# Patient Record
Sex: MALE | Race: WHITE | Employment: FULL TIME
[De-identification: names, ages, dates, MRNs, and addresses within clinical notes are randomized per-mention and may not be internally consistent; named-entity substitution may affect disease eponyms.]

---

## 2017-01-12 ENCOUNTER — OFFICE VISIT (OUTPATIENT)
Dept: ORTHOPEDIC SURGERY | Facility: CLINIC | Age: 57
End: 2017-01-12

## 2017-01-12 DIAGNOSIS — M16.11 ARTHRITIS OF RIGHT HIP: ICD-10-CM

## 2017-01-12 DIAGNOSIS — G89.29 CHRONIC MIDLINE LOW BACK PAIN WITHOUT SCIATICA: ICD-10-CM

## 2017-01-12 DIAGNOSIS — M54.50 CHRONIC MIDLINE LOW BACK PAIN WITHOUT SCIATICA: ICD-10-CM

## 2017-01-12 DIAGNOSIS — M25.551 RIGHT HIP PAIN: Primary | ICD-10-CM

## 2017-01-12 PROCEDURE — 99213 OFFICE O/P EST LOW 20 MIN: CPT | Performed by: ORTHOPAEDIC SURGERY

## 2017-01-16 ENCOUNTER — TELEPHONE (OUTPATIENT)
Dept: ORTHOPEDIC SURGERY | Facility: CLINIC | Age: 57
End: 2017-01-16

## 2017-01-25 ENCOUNTER — OFFICE VISIT (OUTPATIENT)
Dept: FAMILY MEDICINE CLINIC | Facility: CLINIC | Age: 57
End: 2017-01-25

## 2017-01-25 VITALS
DIASTOLIC BLOOD PRESSURE: 72 MMHG | BODY MASS INDEX: 25.68 KG/M2 | HEIGHT: 70 IN | WEIGHT: 179.4 LBS | SYSTOLIC BLOOD PRESSURE: 115 MMHG | HEART RATE: 64 BPM

## 2017-01-25 DIAGNOSIS — Z00.00 ENCOUNTER FOR WELL ADULT EXAM WITHOUT ABNORMAL FINDINGS: Primary | ICD-10-CM

## 2017-01-25 PROCEDURE — 99386 PREV VISIT NEW AGE 40-64: CPT | Performed by: FAMILY MEDICINE

## 2017-01-25 RX ORDER — SUMATRIPTAN 100 MG/1
100 TABLET, FILM COATED ORAL
COMMUNITY
End: 2017-12-28 | Stop reason: SDUPTHER

## 2017-03-10 ENCOUNTER — OFFICE VISIT (OUTPATIENT)
Dept: FAMILY MEDICINE CLINIC | Facility: CLINIC | Age: 57
End: 2017-03-10

## 2017-03-10 VITALS
HEIGHT: 70 IN | BODY MASS INDEX: 25.34 KG/M2 | RESPIRATION RATE: 16 BRPM | WEIGHT: 177 LBS | HEART RATE: 74 BPM | SYSTOLIC BLOOD PRESSURE: 119 MMHG | DIASTOLIC BLOOD PRESSURE: 78 MMHG | OXYGEN SATURATION: 96 %

## 2017-03-10 DIAGNOSIS — S86.112A: Primary | ICD-10-CM

## 2017-03-10 PROCEDURE — 99213 OFFICE O/P EST LOW 20 MIN: CPT | Performed by: FAMILY MEDICINE

## 2017-04-12 ENCOUNTER — EMPLOYEE WELLNESS (OUTPATIENT)
Dept: OTHER | Age: 57
End: 2017-04-12

## 2017-04-12 LAB
CHOLESTEROL/HDL RATIO: 4.8
CHOLESTEROL: 191 MG/DL
GLUCOSE BLD-MCNC: 97 MG/DL (ref 70–99)
HDLC SERPL-MCNC: 40 MG/DL
LDL CHOLESTEROL: 119 MG/DL (ref 0–130)
PATIENT FASTING?: YES
TRIGL SERPL-MCNC: 159 MG/DL
VLDLC SERPL CALC-MCNC: ABNORMAL MG/DL (ref 1–30)

## 2017-05-18 ENCOUNTER — HOSPITAL ENCOUNTER (OUTPATIENT)
Dept: PHYSICAL THERAPY | Facility: CLINIC | Age: 57
Setting detail: THERAPIES SERIES
Discharge: HOME | End: 2017-05-18
Payer: COMMERCIAL

## 2017-05-18 PROCEDURE — 97162 PT EVAL MOD COMPLEX 30 MIN: CPT

## 2017-05-18 PROCEDURE — 97110 THERAPEUTIC EXERCISES: CPT

## 2017-05-22 ENCOUNTER — HOSPITAL ENCOUNTER (OUTPATIENT)
Dept: PHYSICAL THERAPY | Facility: CLINIC | Age: 57
Setting detail: THERAPIES SERIES
Discharge: HOME | End: 2017-05-22
Payer: COMMERCIAL

## 2017-05-22 PROCEDURE — 97110 THERAPEUTIC EXERCISES: CPT

## 2017-05-23 ENCOUNTER — HOSPITAL ENCOUNTER (OUTPATIENT)
Dept: PHYSICAL THERAPY | Facility: CLINIC | Age: 57
Setting detail: THERAPIES SERIES
Discharge: HOME | End: 2017-05-23
Payer: COMMERCIAL

## 2017-05-23 PROCEDURE — 97110 THERAPEUTIC EXERCISES: CPT

## 2017-05-24 ENCOUNTER — HOSPITAL ENCOUNTER (OUTPATIENT)
Age: 57
Setting detail: SPECIMEN
Discharge: HOME | End: 2017-05-24
Payer: COMMERCIAL

## 2017-05-24 DIAGNOSIS — Z00.00 ENCOUNTER FOR WELL ADULT EXAM WITHOUT ABNORMAL FINDINGS: ICD-10-CM

## 2017-05-24 LAB
ABSOLUTE EOS #: 0.3 K/UL (ref 0–0.4)
ABSOLUTE LYMPH #: 1.8 K/UL (ref 1–4.8)
ABSOLUTE MONO #: 0.5 K/UL (ref 0.1–1.2)
ALBUMIN SERPL-MCNC: 4.3 G/DL (ref 3.5–5.2)
ALBUMIN/GLOBULIN RATIO: 1.4 (ref 1–2.5)
ALP BLD-CCNC: 50 U/L (ref 40–129)
ALT SERPL-CCNC: 24 U/L (ref 5–41)
ANION GAP SERPL CALCULATED.3IONS-SCNC: 15 MMOL/L (ref 9–17)
AST SERPL-CCNC: 20 U/L
BASOPHILS # BLD: 0 %
BASOPHILS ABSOLUTE: 0 K/UL (ref 0–0.2)
BILIRUB SERPL-MCNC: 0.72 MG/DL (ref 0.3–1.2)
BUN BLDV-MCNC: 17 MG/DL (ref 6–20)
BUN/CREAT BLD: ABNORMAL (ref 9–20)
CALCIUM SERPL-MCNC: 9.2 MG/DL (ref 8.6–10.4)
CHLORIDE BLD-SCNC: 107 MMOL/L (ref 98–107)
CO2: 23 MMOL/L (ref 20–31)
CREAT SERPL-MCNC: 0.79 MG/DL (ref 0.7–1.2)
DIFFERENTIAL TYPE: NORMAL
EOSINOPHILS RELATIVE PERCENT: 4 %
GFR AFRICAN AMERICAN: >60 ML/MIN
GFR NON-AFRICAN AMERICAN: >60 ML/MIN
GFR SERPL CREATININE-BSD FRML MDRD: ABNORMAL ML/MIN/{1.73_M2}
GFR SERPL CREATININE-BSD FRML MDRD: ABNORMAL ML/MIN/{1.73_M2}
GLUCOSE BLD-MCNC: 97 MG/DL (ref 70–99)
HCT VFR BLD CALC: 46.3 % (ref 41–53)
HEMOGLOBIN: 15.6 G/DL (ref 13.5–17.5)
LYMPHOCYTES # BLD: 23 %
MCH RBC QN AUTO: 31 PG (ref 26–34)
MCHC RBC AUTO-ENTMCNC: 33.6 G/DL (ref 31–37)
MCV RBC AUTO: 92.1 FL (ref 80–100)
MONOCYTES # BLD: 6 %
PDW BLD-RTO: 13.9 % (ref 12.5–15.4)
PLATELET # BLD: 159 K/UL (ref 140–450)
PLATELET ESTIMATE: NORMAL
PMV BLD AUTO: 8.8 FL (ref 6–12)
POTASSIUM SERPL-SCNC: 4.8 MMOL/L (ref 3.7–5.3)
PROSTATE SPECIFIC ANTIGEN: 2.32 UG/L
RBC # BLD: 5.02 M/UL (ref 4.5–5.9)
RBC # BLD: NORMAL 10*6/UL
SEG NEUTROPHILS: 67 %
SEGMENTED NEUTROPHILS ABSOLUTE COUNT: 5.2 K/UL (ref 1.8–7.7)
SODIUM BLD-SCNC: 145 MMOL/L (ref 135–144)
THYROXINE, FREE: 1.19 NG/DL (ref 0.93–1.7)
TOTAL PROTEIN: 7.4 G/DL (ref 6.4–8.3)
TSH SERPL DL<=0.05 MIU/L-ACNC: 2.58 MIU/L (ref 0.3–5)
WBC # BLD: 7.8 K/UL (ref 3.5–11)
WBC # BLD: NORMAL 10*3/UL

## 2017-05-25 ENCOUNTER — HOSPITAL ENCOUNTER (OUTPATIENT)
Dept: PHYSICAL THERAPY | Facility: CLINIC | Age: 57
Setting detail: THERAPIES SERIES
Discharge: HOME | End: 2017-05-25
Payer: COMMERCIAL

## 2017-05-25 PROCEDURE — 97110 THERAPEUTIC EXERCISES: CPT

## 2017-05-26 LAB — THYROID PEROXIDASE (TPO) AB: 40.3 IU/ML (ref 0–35)

## 2017-05-30 ENCOUNTER — HOSPITAL ENCOUNTER (OUTPATIENT)
Dept: PHYSICAL THERAPY | Facility: CLINIC | Age: 57
Setting detail: THERAPIES SERIES
Discharge: HOME | End: 2017-05-30
Payer: COMMERCIAL

## 2017-05-30 PROCEDURE — 97110 THERAPEUTIC EXERCISES: CPT

## 2017-06-01 ENCOUNTER — HOSPITAL ENCOUNTER (OUTPATIENT)
Dept: PHYSICAL THERAPY | Facility: CLINIC | Age: 57
Setting detail: THERAPIES SERIES
Discharge: HOME | End: 2017-06-01
Payer: COMMERCIAL

## 2017-06-01 PROCEDURE — 97110 THERAPEUTIC EXERCISES: CPT

## 2017-06-05 ENCOUNTER — APPOINTMENT (OUTPATIENT)
Dept: PHYSICAL THERAPY | Facility: CLINIC | Age: 57
End: 2017-06-05
Payer: COMMERCIAL

## 2017-06-06 ENCOUNTER — HOSPITAL ENCOUNTER (OUTPATIENT)
Dept: PHYSICAL THERAPY | Facility: CLINIC | Age: 57
Setting detail: THERAPIES SERIES
Discharge: HOME | End: 2017-06-06
Payer: COMMERCIAL

## 2017-06-06 PROCEDURE — 97110 THERAPEUTIC EXERCISES: CPT

## 2017-06-07 ENCOUNTER — APPOINTMENT (OUTPATIENT)
Dept: PHYSICAL THERAPY | Facility: CLINIC | Age: 57
End: 2017-06-07
Payer: COMMERCIAL

## 2017-06-08 ENCOUNTER — APPOINTMENT (OUTPATIENT)
Dept: PHYSICAL THERAPY | Facility: CLINIC | Age: 57
End: 2017-06-08
Payer: COMMERCIAL

## 2017-06-12 ENCOUNTER — APPOINTMENT (OUTPATIENT)
Dept: PHYSICAL THERAPY | Facility: CLINIC | Age: 57
End: 2017-06-12
Payer: COMMERCIAL

## 2017-06-13 ENCOUNTER — HOSPITAL ENCOUNTER (OUTPATIENT)
Dept: PHYSICAL THERAPY | Facility: CLINIC | Age: 57
Setting detail: THERAPIES SERIES
Discharge: HOME | End: 2017-06-13
Payer: COMMERCIAL

## 2017-06-13 PROCEDURE — 97110 THERAPEUTIC EXERCISES: CPT

## 2017-06-14 ENCOUNTER — HOSPITAL ENCOUNTER (OUTPATIENT)
Dept: PHYSICAL THERAPY | Facility: CLINIC | Age: 57
Setting detail: THERAPIES SERIES
Discharge: HOME | End: 2017-06-14
Payer: COMMERCIAL

## 2017-06-14 PROCEDURE — 97110 THERAPEUTIC EXERCISES: CPT

## 2017-06-15 ENCOUNTER — APPOINTMENT (OUTPATIENT)
Dept: PHYSICAL THERAPY | Facility: CLINIC | Age: 57
End: 2017-06-15
Payer: COMMERCIAL

## 2017-06-20 ENCOUNTER — APPOINTMENT (OUTPATIENT)
Dept: PHYSICAL THERAPY | Facility: CLINIC | Age: 57
End: 2017-06-20
Payer: COMMERCIAL

## 2017-07-06 ENCOUNTER — OFFICE VISIT (OUTPATIENT)
Dept: FAMILY MEDICINE CLINIC | Age: 57
End: 2017-07-06
Payer: COMMERCIAL

## 2017-07-06 VITALS
TEMPERATURE: 97.9 F | BODY MASS INDEX: 25.48 KG/M2 | RESPIRATION RATE: 14 BRPM | HEIGHT: 70 IN | SYSTOLIC BLOOD PRESSURE: 120 MMHG | OXYGEN SATURATION: 96 % | WEIGHT: 178 LBS | DIASTOLIC BLOOD PRESSURE: 77 MMHG | HEART RATE: 54 BPM

## 2017-07-06 DIAGNOSIS — H66.91 RIGHT OTITIS MEDIA, UNSPECIFIED CHRONICITY, UNSPECIFIED OTITIS MEDIA TYPE: Primary | ICD-10-CM

## 2017-07-06 PROCEDURE — 99214 OFFICE O/P EST MOD 30 MIN: CPT | Performed by: FAMILY MEDICINE

## 2017-07-06 RX ORDER — PREDNISONE 20 MG/1
40 TABLET ORAL DAILY
Qty: 8 TABLET | Refills: 0 | Status: SHIPPED | OUTPATIENT
Start: 2017-07-06 | End: 2017-07-10

## 2017-07-06 RX ORDER — AMOXICILLIN AND CLAVULANATE POTASSIUM 875; 125 MG/1; MG/1
1 TABLET, FILM COATED ORAL 2 TIMES DAILY
Qty: 20 TABLET | Refills: 0 | Status: SHIPPED | OUTPATIENT
Start: 2017-07-06 | End: 2017-07-16

## 2017-07-06 ASSESSMENT — PATIENT HEALTH QUESTIONNAIRE - PHQ9
1. LITTLE INTEREST OR PLEASURE IN DOING THINGS: 0
SUM OF ALL RESPONSES TO PHQ9 QUESTIONS 1 & 2: 0
SUM OF ALL RESPONSES TO PHQ QUESTIONS 1-9: 0
2. FEELING DOWN, DEPRESSED OR HOPELESS: 0

## 2017-09-25 ENCOUNTER — HOSPITAL ENCOUNTER (OUTPATIENT)
Age: 57
Setting detail: SPECIMEN
Discharge: HOME | End: 2017-09-25
Payer: COMMERCIAL

## 2017-09-25 RX ORDER — OMEPRAZOLE 20 MG/1
20 CAPSULE, DELAYED RELEASE ORAL 2 TIMES DAILY
Qty: 180 CAPSULE | Refills: 1 | Status: SHIPPED | OUTPATIENT
Start: 2017-09-25 | End: 2019-03-06 | Stop reason: SDUPTHER

## 2017-09-28 LAB — DERMATOLOGY PATHOLOGY REPORT: NORMAL

## 2017-11-14 ENCOUNTER — OFFICE VISIT (OUTPATIENT)
Dept: FAMILY MEDICINE CLINIC | Age: 57
End: 2017-11-14
Payer: COMMERCIAL

## 2017-11-14 ENCOUNTER — HOSPITAL ENCOUNTER (OUTPATIENT)
Age: 57
Setting detail: SPECIMEN
Discharge: HOME | End: 2017-11-14
Payer: COMMERCIAL

## 2017-11-14 VITALS
DIASTOLIC BLOOD PRESSURE: 72 MMHG | OXYGEN SATURATION: 96 % | RESPIRATION RATE: 16 BRPM | WEIGHT: 184 LBS | TEMPERATURE: 98.3 F | SYSTOLIC BLOOD PRESSURE: 114 MMHG | BODY MASS INDEX: 26.34 KG/M2 | HEIGHT: 70 IN | HEART RATE: 57 BPM

## 2017-11-14 DIAGNOSIS — R05.3 PERSISTENT COUGH: ICD-10-CM

## 2017-11-14 DIAGNOSIS — J40 BRONCHITIS: Primary | ICD-10-CM

## 2017-11-14 PROCEDURE — 99214 OFFICE O/P EST MOD 30 MIN: CPT | Performed by: FAMILY MEDICINE

## 2017-11-14 RX ORDER — GUAIFENESIN 600 MG/1
600 TABLET, EXTENDED RELEASE ORAL 2 TIMES DAILY
Qty: 20 TABLET | Refills: 0 | Status: SHIPPED | OUTPATIENT
Start: 2017-11-14 | End: 2019-06-25

## 2017-11-14 NOTE — PROGRESS NOTES
bronchitis. Cough  Patient complains of productive cough with sputum described as yellow and sore throat. Symptoms began several weeks ago. Symptoms have been unchanged since that time. The cough is productive and is aggravated by nothing. Associated symptoms include: sore throat. Patient does not have new pets. Patient does not have a history of asthma. Patient does not have a history of environmental allergens. Patient has not traveled recently. Patient does not have a history of smoking. Patient has not had a previous chest x-ray. Patient has not had a PPD done. Acid reflux, controlled. Review of Systems   Constitutional: Negative for fever and unexpected weight change. HENT: Negative for ear pain, congestion, + sore throat and  + rhinorrhea. Eyes: Negative for itching and visual disturbance. Respiratory: Negative for shortness of breath.   positive for cough plus sputum production. Cardiovascular: Negative for chest pain and leg swelling. Gastrointestinal: Negative for diarrhea, constipation and blood in stool. Skin: Negative for color change and rash. Objective:   Physical Exam  Constitutional: VS (see above). General appearance: normal development, habitus and attention, no deformities. Eyes: normal conjunctiva and lids. TMs bilaterally with normal limits. Slight cerumen present in his right ear. Oropharynx: Slight post nasal drip present. No redness, no irritation. Neck: No lymph nodes palpable. CAV: RRR, no RMG. No edema lower extremities. Pulmo: CTA bilateral, no CWR. Skin: no rashes, lesions or ulcers. Musculoskeletal: normal gait. Nails: no clubbing or cyanosis. Psychiatric: alert and oriented to place, time and person. Normal mood and affect. Assessment:      1. Bronchitis     2. Persistent cough  guaiFENesin (MUCINEX) 600 MG extended release tablet    Mycoplasma Pneumoniae Antibody, IgG       Plan:   She has probably a viral bronchitis.   I discussed with him to watch it.  If this does not resolve in 2-4 weeks. He needs to call back. I discussed with him a viral bronchitis could stay around for 4 - 8 weeks. Blood work ordered. med called in. Call or return to clinic prn if these symptoms worsen or fail to improve as anticipated. I have reviewed the instructions with the patient, answering all questions to his satisfaction. Return in about 6 months (around 5/14/2018), or if symptoms worsen or fail to improve.   Orders Placed This Encounter   Procedures    Mycoplasma Pneumoniae Antibody, IgG     Standing Status:   Future     Standing Expiration Date:   11/14/2018     Orders Placed This Encounter   Medications    guaiFENesin (MUCINEX) 600 MG extended release tablet     Sig: Take 1 tablet by mouth 2 times daily     Dispense:  20 tablet     Refill:  0       Electronically signed by Jarvis Taylor MD on 11/14/2017 at 10:31 AM       (Please note that portions of this note were completed with a voice recognition program. Efforts were made to edit the dictations but occasionally words are mis-transcribed.)

## 2017-11-14 NOTE — PROGRESS NOTES
Visit Information    Have you changed or started any medications since your last visit including any over-the-counter medicines, vitamins, or herbal medicines? no   Have you stopped taking any of your medications? Is so, why? -  no  Are you having any side effects from any of your medications? - no    Have you seen any other physician or provider since your last visit?  no   Have you had any other diagnostic tests since your last visit?  no   Have you been seen in the emergency room and/or had an admission in a hospital since we last saw you?  no   Have you had your routine dental cleaning in the past 6 months?  yes      Do you have an active MyChart account? If no, what is the barrier?   Yes    Patient Care Team:  Bob Mayo MD as PCP - General (Family Medicine)  Bob Mayo MD as PCP - [facility] Attributed Provider    Medical History Review  Past Medical, Family, and Social History reviewed and does not contribute to the patient presenting condition    Health Maintenance   Topic Date Due    Hepatitis C screen  1960    HIV screen  04/15/1977    DTaP/Tdap/Td vaccine (1 - Tdap) 04/15/1981    Flu vaccine (1) 09/01/2017    Colon cancer screen colonoscopy  11/30/2017 (Originally 4/15/2012)    Lipid screen  04/02/2020

## 2017-11-20 LAB — MYCOPLASMA PNEUMONIAE IGG: 1.29

## 2017-11-20 RX ORDER — AZITHROMYCIN 250 MG/1
TABLET, FILM COATED ORAL
Qty: 6 TABLET | Refills: 0 | Status: SHIPPED | OUTPATIENT
Start: 2017-11-20 | End: 2017-11-30

## 2017-12-29 RX ORDER — SUMATRIPTAN 100 MG/1
100 TABLET, FILM COATED ORAL
Qty: 9 TABLET | Refills: 0 | Status: SHIPPED | OUTPATIENT
Start: 2017-12-29 | End: 2018-08-03 | Stop reason: SDUPTHER

## 2018-03-20 VITALS — BODY MASS INDEX: 25.34 KG/M2 | WEIGHT: 177 LBS

## 2018-03-22 ENCOUNTER — EMPLOYEE WELLNESS (OUTPATIENT)
Dept: OTHER | Age: 58
End: 2018-03-22

## 2018-03-22 LAB
CHOLESTEROL/HDL RATIO: 3.9
CHOLESTEROL: 193 MG/DL
GLUCOSE BLD-MCNC: 96 MG/DL (ref 70–99)
HDLC SERPL-MCNC: 50 MG/DL
LDL CHOLESTEROL: 116 MG/DL (ref 0–130)
PATIENT FASTING?: YES
TRIGL SERPL-MCNC: 137 MG/DL
VLDLC SERPL CALC-MCNC: NORMAL MG/DL (ref 1–30)

## 2018-03-28 ENCOUNTER — HOSPITAL ENCOUNTER (OUTPATIENT)
Dept: PHYSICAL THERAPY | Facility: CLINIC | Age: 58
Setting detail: THERAPIES SERIES
Discharge: HOME | End: 2018-03-28
Payer: COMMERCIAL

## 2018-03-28 PROCEDURE — 97110 THERAPEUTIC EXERCISES: CPT

## 2018-03-28 PROCEDURE — 97161 PT EVAL LOW COMPLEX 20 MIN: CPT

## 2018-03-28 NOTE — CONSULTS
wnl  5   Knee Flex  wnl  5   Ext  wnl  5   Ankle DF  wnl  5   PF  wnl  5   INV  wnl  5   EVER  wnl  5   GTE  wnl  5       OBSERVATION No Deficit Deficit Not Tested Comments   Posture       Forward Head [] [] []    Rounded Shoulders [] [] []    Kyphosis [] [] []    Lordosis [] [] []    Scoliosis [] [] []    Iliac Crest [] [] []    PSIS [] [] []    ASIS [] [] []    Genu Valgus [] [] []    Genu Varus [] [] []    Genu Recurvatum [] [] []    Pronation [] [] []    Supination [] [] []    Leg Length Discrp [x] [] []    Slumped Sitting [] [] []    Palpation [] [x] [] Mid substance of R AT, neg at insertion and muscle tendon jtn   Sensation [] [] []    Edema [x] [] []    Neurological [] [] []    Patellar Mobility [] [] []    Patellar Orientation [] [] []    Gait [] [] [] Analysis:     Video Run Analysis   Warm up:   Pace:   min/mile   Duration: 5 minutes    Shoes: [facility] Ride with Powersteps   Flor: 165 spm    Frontal plane deviations:    Normal pronation    Dynamic genu /varus bilaterally    Contralateral pelvic drop        Sagittal plane deviations:     Tibia is nearly perpendicular to ground with slight  at initial contact    Delayed heel off       Other: double stance,      Recommendations:     Increase flor by 5%    Comments:  Assessment:  Pt presents with primary complaint of R AT pain, which is most likely due to 2 issues. Eccentrically weak calf causing delayed heel to toe off in later 1/2 of stance phase. He also presents with limited hip extension ROM and strength and frontal plane weakness during initial contact to mid stance   Problems:    [x] ? Pain: in R achilles   [x] ? ROM: with hip ext   [x] ? Strength: Of glut max and calf    [x] ?  Function: Not able to run as he wishes   [x] Gait Deviations delayed heel off during running)  [] Other:      STG: (to be met in 3 treatments)  1. ? Pain:<2/10 in R achilles  2. ? ROM: with hip extension  3. ? Strength:to 4/5 with glut max  4. ? Function: able to run up to

## 2018-04-02 VITALS — BODY MASS INDEX: 25.34 KG/M2 | WEIGHT: 177 LBS

## 2018-04-03 ENCOUNTER — HOSPITAL ENCOUNTER (OUTPATIENT)
Dept: PHYSICAL THERAPY | Facility: CLINIC | Age: 58
Setting detail: THERAPIES SERIES
Discharge: HOME | End: 2018-04-03
Payer: COMMERCIAL

## 2018-04-03 PROCEDURE — 97110 THERAPEUTIC EXERCISES: CPT

## 2018-04-03 PROCEDURE — 97140 MANUAL THERAPY 1/> REGIONS: CPT

## 2018-04-03 PROCEDURE — 97112 NEUROMUSCULAR REEDUCATION: CPT

## 2018-04-04 ENCOUNTER — PATIENT MESSAGE (OUTPATIENT)
Dept: FAMILY MEDICINE CLINIC | Age: 58
End: 2018-04-04

## 2018-04-05 ENCOUNTER — HOSPITAL ENCOUNTER (OUTPATIENT)
Dept: PHYSICAL THERAPY | Facility: CLINIC | Age: 58
Setting detail: THERAPIES SERIES
Discharge: HOME | End: 2018-04-05
Payer: COMMERCIAL

## 2018-04-05 ENCOUNTER — ANESTHESIA EVENT (OUTPATIENT)
Dept: OPERATING ROOM | Age: 58
End: 2018-04-05
Payer: COMMERCIAL

## 2018-04-05 PROCEDURE — 97110 THERAPEUTIC EXERCISES: CPT

## 2018-04-05 RX ORDER — ALBUTEROL SULFATE 90 UG/1
2 AEROSOL, METERED RESPIRATORY (INHALATION) EVERY 6 HOURS PRN
Qty: 1 INHALER | Refills: 3 | Status: SHIPPED | OUTPATIENT
Start: 2018-04-05 | End: 2018-11-14 | Stop reason: SDUPTHER

## 2018-04-06 ENCOUNTER — HOSPITAL ENCOUNTER (OUTPATIENT)
Age: 58
Setting detail: OUTPATIENT SURGERY
Discharge: HOME | End: 2018-04-06
Attending: INTERNAL MEDICINE | Admitting: INTERNAL MEDICINE
Payer: COMMERCIAL

## 2018-04-06 ENCOUNTER — ANESTHESIA (OUTPATIENT)
Dept: OPERATING ROOM | Age: 58
End: 2018-04-06
Payer: COMMERCIAL

## 2018-04-06 VITALS
OXYGEN SATURATION: 98 % | TEMPERATURE: 97.9 F | HEIGHT: 71 IN | RESPIRATION RATE: 18 BRPM | HEART RATE: 62 BPM | BODY MASS INDEX: 24.78 KG/M2 | SYSTOLIC BLOOD PRESSURE: 132 MMHG | DIASTOLIC BLOOD PRESSURE: 75 MMHG | WEIGHT: 177.03 LBS

## 2018-04-06 VITALS
OXYGEN SATURATION: 97 % | SYSTOLIC BLOOD PRESSURE: 98 MMHG | RESPIRATION RATE: 15 BRPM | DIASTOLIC BLOOD PRESSURE: 56 MMHG

## 2018-04-06 PROCEDURE — 6360000002 HC RX W HCPCS: Performed by: NURSE ANESTHETIST, CERTIFIED REGISTERED

## 2018-04-06 PROCEDURE — 2500000003 HC RX 250 WO HCPCS: Performed by: NURSE ANESTHETIST, CERTIFIED REGISTERED

## 2018-04-06 PROCEDURE — 2580000003 HC RX 258: Performed by: ANESTHESIOLOGY

## 2018-04-06 PROCEDURE — 7100000010 HC PHASE II RECOVERY - FIRST 15 MIN: Performed by: INTERNAL MEDICINE

## 2018-04-06 PROCEDURE — 88305 TISSUE EXAM BY PATHOLOGIST: CPT

## 2018-04-06 PROCEDURE — 3700000000 HC ANESTHESIA ATTENDED CARE: Performed by: INTERNAL MEDICINE

## 2018-04-06 PROCEDURE — 3700000001 HC ADD 15 MINUTES (ANESTHESIA): Performed by: INTERNAL MEDICINE

## 2018-04-06 PROCEDURE — 3609010600 HC COLONOSCOPY POLYPECTOMY SNARE/COLD BIOPSY: Performed by: INTERNAL MEDICINE

## 2018-04-06 PROCEDURE — 7100000011 HC PHASE II RECOVERY - ADDTL 15 MIN: Performed by: INTERNAL MEDICINE

## 2018-04-06 RX ORDER — PROPOFOL 10 MG/ML
INJECTION, EMULSION INTRAVENOUS PRN
Status: DISCONTINUED | OUTPATIENT
Start: 2018-04-06 | End: 2018-04-06 | Stop reason: SDUPTHER

## 2018-04-06 RX ORDER — SODIUM CHLORIDE 0.9 % (FLUSH) 0.9 %
10 SYRINGE (ML) INJECTION EVERY 12 HOURS SCHEDULED
Status: DISCONTINUED | OUTPATIENT
Start: 2018-04-06 | End: 2018-04-06 | Stop reason: HOSPADM

## 2018-04-06 RX ORDER — ONDANSETRON 2 MG/ML
4 INJECTION INTRAMUSCULAR; INTRAVENOUS
Status: DISCONTINUED | OUTPATIENT
Start: 2018-04-06 | End: 2018-04-06 | Stop reason: HOSPADM

## 2018-04-06 RX ORDER — SODIUM CHLORIDE, SODIUM LACTATE, POTASSIUM CHLORIDE, CALCIUM CHLORIDE 600; 310; 30; 20 MG/100ML; MG/100ML; MG/100ML; MG/100ML
INJECTION, SOLUTION INTRAVENOUS CONTINUOUS
Status: DISCONTINUED | OUTPATIENT
Start: 2018-04-07 | End: 2018-04-06 | Stop reason: HOSPADM

## 2018-04-06 RX ORDER — FENTANYL CITRATE 50 UG/ML
INJECTION, SOLUTION INTRAMUSCULAR; INTRAVENOUS PRN
Status: DISCONTINUED | OUTPATIENT
Start: 2018-04-06 | End: 2018-04-06 | Stop reason: SDUPTHER

## 2018-04-06 RX ORDER — SODIUM CHLORIDE 9 MG/ML
INJECTION, SOLUTION INTRAVENOUS CONTINUOUS
Status: DISCONTINUED | OUTPATIENT
Start: 2018-04-07 | End: 2018-04-06

## 2018-04-06 RX ORDER — LIDOCAINE HYDROCHLORIDE 20 MG/ML
INJECTION, SOLUTION INFILTRATION; PERINEURAL PRN
Status: DISCONTINUED | OUTPATIENT
Start: 2018-04-06 | End: 2018-04-06 | Stop reason: SDUPTHER

## 2018-04-06 RX ORDER — SODIUM CHLORIDE 0.9 % (FLUSH) 0.9 %
10 SYRINGE (ML) INJECTION PRN
Status: DISCONTINUED | OUTPATIENT
Start: 2018-04-06 | End: 2018-04-06 | Stop reason: HOSPADM

## 2018-04-06 RX ORDER — MIDAZOLAM HYDROCHLORIDE 1 MG/ML
INJECTION INTRAMUSCULAR; INTRAVENOUS PRN
Status: DISCONTINUED | OUTPATIENT
Start: 2018-04-06 | End: 2018-04-06 | Stop reason: SDUPTHER

## 2018-04-06 RX ORDER — LIDOCAINE HYDROCHLORIDE 10 MG/ML
1 INJECTION, SOLUTION EPIDURAL; INFILTRATION; INTRACAUDAL; PERINEURAL
Status: DISCONTINUED | OUTPATIENT
Start: 2018-04-06 | End: 2018-04-06 | Stop reason: HOSPADM

## 2018-04-06 RX ADMIN — FENTANYL CITRATE 25 MCG: 50 INJECTION, SOLUTION INTRAMUSCULAR; INTRAVENOUS at 13:37

## 2018-04-06 RX ADMIN — PROPOFOL 20 MG: 10 INJECTION, EMULSION INTRAVENOUS at 13:39

## 2018-04-06 RX ADMIN — SODIUM CHLORIDE, POTASSIUM CHLORIDE, SODIUM LACTATE AND CALCIUM CHLORIDE: 600; 310; 30; 20 INJECTION, SOLUTION INTRAVENOUS at 12:13

## 2018-04-06 RX ADMIN — PROPOFOL 50 MG: 10 INJECTION, EMULSION INTRAVENOUS at 13:35

## 2018-04-06 RX ADMIN — PROPOFOL 10 MG: 10 INJECTION, EMULSION INTRAVENOUS at 13:41

## 2018-04-06 RX ADMIN — PROPOFOL 20 MG: 10 INJECTION, EMULSION INTRAVENOUS at 13:37

## 2018-04-06 RX ADMIN — LIDOCAINE HYDROCHLORIDE 100 MG: 20 INJECTION, SOLUTION INFILTRATION; PERINEURAL at 13:35

## 2018-04-06 RX ADMIN — PROPOFOL 40 MG: 10 INJECTION, EMULSION INTRAVENOUS at 13:49

## 2018-04-06 RX ADMIN — FENTANYL CITRATE 25 MCG: 50 INJECTION, SOLUTION INTRAMUSCULAR; INTRAVENOUS at 13:42

## 2018-04-06 RX ADMIN — FENTANYL CITRATE 25 MCG: 50 INJECTION, SOLUTION INTRAMUSCULAR; INTRAVENOUS at 13:35

## 2018-04-06 RX ADMIN — MIDAZOLAM HYDROCHLORIDE 2 MG: 1 INJECTION, SOLUTION INTRAMUSCULAR; INTRAVENOUS at 13:30

## 2018-04-06 RX ADMIN — PROPOFOL 20 MG: 10 INJECTION, EMULSION INTRAVENOUS at 13:40

## 2018-04-06 RX ADMIN — PROPOFOL 20 MG: 10 INJECTION, EMULSION INTRAVENOUS at 13:45

## 2018-04-06 RX ADMIN — PROPOFOL 20 MG: 10 INJECTION, EMULSION INTRAVENOUS at 13:43

## 2018-04-06 RX ADMIN — FENTANYL CITRATE 25 MCG: 50 INJECTION, SOLUTION INTRAMUSCULAR; INTRAVENOUS at 13:40

## 2018-04-06 ASSESSMENT — PULMONARY FUNCTION TESTS
PIF_VALUE: 1

## 2018-04-06 ASSESSMENT — ENCOUNTER SYMPTOMS: SHORTNESS OF BREATH: 0

## 2018-04-06 ASSESSMENT — PAIN - FUNCTIONAL ASSESSMENT: PAIN_FUNCTIONAL_ASSESSMENT: 0-10

## 2018-04-10 LAB — SURGICAL PATHOLOGY REPORT: NORMAL

## 2018-04-11 ENCOUNTER — APPOINTMENT (OUTPATIENT)
Dept: PHYSICAL THERAPY | Facility: CLINIC | Age: 58
End: 2018-04-11
Payer: COMMERCIAL

## 2018-04-13 ENCOUNTER — HOSPITAL ENCOUNTER (OUTPATIENT)
Dept: PHYSICAL THERAPY | Facility: CLINIC | Age: 58
Setting detail: THERAPIES SERIES
Discharge: HOME | End: 2018-04-13
Payer: COMMERCIAL

## 2018-04-13 PROCEDURE — 97530 THERAPEUTIC ACTIVITIES: CPT

## 2018-04-13 PROCEDURE — 97112 NEUROMUSCULAR REEDUCATION: CPT

## 2018-04-18 ENCOUNTER — TELEPHONE (OUTPATIENT)
Dept: ORTHOPEDIC SURGERY | Age: 58
End: 2018-04-18

## 2018-04-19 ENCOUNTER — PATIENT MESSAGE (OUTPATIENT)
Dept: FAMILY MEDICINE CLINIC | Age: 58
End: 2018-04-19

## 2018-05-17 ENCOUNTER — HOSPITAL ENCOUNTER (OUTPATIENT)
Dept: PHYSICAL THERAPY | Facility: CLINIC | Age: 58
Setting detail: THERAPIES SERIES
Discharge: HOME | End: 2018-05-17
Payer: COMMERCIAL

## 2018-05-17 ENCOUNTER — OFFICE VISIT (OUTPATIENT)
Dept: ORTHOPEDIC SURGERY | Age: 58
End: 2018-05-17
Payer: COMMERCIAL

## 2018-05-17 VITALS — WEIGHT: 175 LBS | BODY MASS INDEX: 24.5 KG/M2 | HEIGHT: 71 IN

## 2018-05-17 DIAGNOSIS — M76.891 TENDONITIS OF KNEE, RIGHT: ICD-10-CM

## 2018-05-17 DIAGNOSIS — M25.561 ACUTE PAIN OF RIGHT KNEE: Primary | ICD-10-CM

## 2018-05-17 PROCEDURE — 99213 OFFICE O/P EST LOW 20 MIN: CPT | Performed by: ORTHOPAEDIC SURGERY

## 2018-05-29 ENCOUNTER — HOSPITAL ENCOUNTER (OUTPATIENT)
Dept: PHYSICAL THERAPY | Facility: CLINIC | Age: 58
Setting detail: THERAPIES SERIES
Discharge: HOME | End: 2018-05-29
Payer: COMMERCIAL

## 2018-05-29 PROCEDURE — 97750 PHYSICAL PERFORMANCE TEST: CPT

## 2018-05-29 PROCEDURE — 97164 PT RE-EVAL EST PLAN CARE: CPT

## 2018-06-05 ENCOUNTER — HOSPITAL ENCOUNTER (OUTPATIENT)
Dept: PHYSICAL THERAPY | Facility: CLINIC | Age: 58
Setting detail: THERAPIES SERIES
Discharge: HOME | End: 2018-06-05
Payer: COMMERCIAL

## 2018-06-05 PROCEDURE — 97112 NEUROMUSCULAR REEDUCATION: CPT

## 2018-06-05 PROCEDURE — 97140 MANUAL THERAPY 1/> REGIONS: CPT

## 2018-06-05 PROCEDURE — 97110 THERAPEUTIC EXERCISES: CPT

## 2018-08-03 ENCOUNTER — PATIENT MESSAGE (OUTPATIENT)
Dept: FAMILY MEDICINE CLINIC | Age: 58
End: 2018-08-03

## 2018-08-03 NOTE — TELEPHONE ENCOUNTER
From: Kenda Siemens  To: Karon Navarro MD  Sent: 8/3/2018 6:12 AM EDT  Subject: Prescription Question    Good Morning Dr. Chidi Ni,  Can you call in two prescriptions for me? I need refills on my Nasonex and Sumatriptan 100 mg.  Earnest Andino [phone]  Thank you,  Jennifer Meneses

## 2018-08-04 RX ORDER — MOMETASONE FUROATE 50 UG/1
2 SPRAY, METERED NASAL DAILY
Qty: 1 INHALER | Refills: 3 | Status: SHIPPED | OUTPATIENT
Start: 2018-08-04 | End: 2018-11-30

## 2018-08-04 RX ORDER — SUMATRIPTAN 100 MG/1
100 TABLET, FILM COATED ORAL
Qty: 9 TABLET | Refills: 0 | Status: SHIPPED | OUTPATIENT
Start: 2018-08-04 | End: 2019-06-21 | Stop reason: SDUPTHER

## 2018-08-06 RX ORDER — FLUTICASONE PROPIONATE 50 MCG
1 SPRAY, SUSPENSION (ML) NASAL DAILY
Qty: 1 BOTTLE | Refills: 3 | Status: SHIPPED | OUTPATIENT
Start: 2018-08-06 | End: 2018-10-01 | Stop reason: SDUPTHER

## 2018-08-22 ENCOUNTER — HOSPITAL ENCOUNTER (OUTPATIENT)
Dept: PHYSICAL THERAPY | Facility: CLINIC | Age: 58
Setting detail: THERAPIES SERIES
Discharge: HOME | End: 2018-08-22
Payer: COMMERCIAL

## 2018-08-22 NOTE — DISCHARGE SUMMARY
HEP/Ed previously given.        Patient Status:     [] Continue per initial plan of care. [] Additional visits necessary.     [x] Other:DC at this time     Signed: Hector Lopez PT

## 2018-09-28 ENCOUNTER — PATIENT MESSAGE (OUTPATIENT)
Dept: FAMILY MEDICINE CLINIC | Age: 58
End: 2018-09-28

## 2018-10-01 RX ORDER — FLUTICASONE PROPIONATE 50 MCG
1 SPRAY, SUSPENSION (ML) NASAL DAILY
Qty: 1 BOTTLE | Refills: 3 | Status: SHIPPED | OUTPATIENT
Start: 2018-10-01 | End: 2018-11-30

## 2018-10-02 ENCOUNTER — PATIENT MESSAGE (OUTPATIENT)
Dept: FAMILY MEDICINE CLINIC | Age: 58
End: 2018-10-02

## 2018-10-02 DIAGNOSIS — Z12.5 PROSTATE CANCER SCREENING: Primary | ICD-10-CM

## 2018-10-10 ENCOUNTER — HOSPITAL ENCOUNTER (OUTPATIENT)
Age: 58
Setting detail: SPECIMEN
Discharge: HOME | End: 2018-10-10
Payer: COMMERCIAL

## 2018-10-10 DIAGNOSIS — Z12.5 PROSTATE CANCER SCREENING: ICD-10-CM

## 2018-10-10 LAB — PROSTATE SPECIFIC ANTIGEN: 1.25 UG/L

## 2018-11-14 ENCOUNTER — PATIENT MESSAGE (OUTPATIENT)
Dept: FAMILY MEDICINE CLINIC | Age: 58
End: 2018-11-14

## 2018-11-14 RX ORDER — ALBUTEROL SULFATE 90 UG/1
2 AEROSOL, METERED RESPIRATORY (INHALATION) EVERY 6 HOURS PRN
Qty: 1 INHALER | Refills: 3 | Status: SHIPPED | OUTPATIENT
Start: 2018-11-14 | End: 2021-06-04 | Stop reason: SDUPTHER

## 2018-11-19 ENCOUNTER — OFFICE VISIT (OUTPATIENT)
Dept: UROLOGY | Age: 58
End: 2018-11-19
Payer: COMMERCIAL

## 2018-11-19 VITALS
TEMPERATURE: 98 F | DIASTOLIC BLOOD PRESSURE: 70 MMHG | BODY MASS INDEX: 25.62 KG/M2 | HEART RATE: 75 BPM | WEIGHT: 183 LBS | SYSTOLIC BLOOD PRESSURE: 118 MMHG | HEIGHT: 71 IN

## 2018-11-19 DIAGNOSIS — R35.1 NOCTURIA: ICD-10-CM

## 2018-11-19 DIAGNOSIS — R39.15 URGENCY OF URINATION: ICD-10-CM

## 2018-11-19 DIAGNOSIS — Z12.5 PROSTATE CANCER SCREENING: Primary | ICD-10-CM

## 2018-11-19 PROCEDURE — 99204 OFFICE O/P NEW MOD 45 MIN: CPT | Performed by: UROLOGY

## 2018-11-19 ASSESSMENT — ENCOUNTER SYMPTOMS
ANAL BLEEDING: 0
EYE PAIN: 0
EYE REDNESS: 0
BACK PAIN: 1
COUGH: 0
COLOR CHANGE: 0
SORE THROAT: 0
WHEEZING: 0
VOMITING: 0
NAUSEA: 0
ABDOMINAL PAIN: 0
SHORTNESS OF BREATH: 0
VOICE CHANGE: 0

## 2018-11-30 ENCOUNTER — OFFICE VISIT (OUTPATIENT)
Dept: FAMILY MEDICINE CLINIC | Age: 58
End: 2018-11-30
Payer: COMMERCIAL

## 2018-11-30 VITALS
HEART RATE: 57 BPM | WEIGHT: 181.4 LBS | TEMPERATURE: 96.7 F | RESPIRATION RATE: 16 BRPM | HEIGHT: 71 IN | BODY MASS INDEX: 25.4 KG/M2 | SYSTOLIC BLOOD PRESSURE: 128 MMHG | DIASTOLIC BLOOD PRESSURE: 80 MMHG

## 2018-11-30 DIAGNOSIS — J45.21 MILD INTERMITTENT ASTHMA WITH EXACERBATION: Primary | ICD-10-CM

## 2018-11-30 DIAGNOSIS — Z23 NEEDS FLU SHOT: ICD-10-CM

## 2018-11-30 DIAGNOSIS — H65.93 MEE (MIDDLE EAR EFFUSION), BILATERAL: ICD-10-CM

## 2018-11-30 PROCEDURE — 99213 OFFICE O/P EST LOW 20 MIN: CPT | Performed by: FAMILY MEDICINE

## 2018-11-30 PROCEDURE — 90471 IMMUNIZATION ADMIN: CPT | Performed by: FAMILY MEDICINE

## 2018-11-30 PROCEDURE — 90686 IIV4 VACC NO PRSV 0.5 ML IM: CPT | Performed by: FAMILY MEDICINE

## 2018-11-30 RX ORDER — PREDNISONE 20 MG/1
40 TABLET ORAL DAILY
Qty: 10 TABLET | Refills: 0 | Status: SHIPPED | OUTPATIENT
Start: 2018-11-30 | End: 2018-12-05

## 2018-11-30 RX ORDER — FLUTICASONE PROPIONATE 50 MCG
2 SPRAY, SUSPENSION (ML) NASAL DAILY
Qty: 1 BOTTLE | Refills: 0 | Status: SHIPPED | OUTPATIENT
Start: 2018-11-30 | End: 2019-06-25

## 2018-11-30 ASSESSMENT — PATIENT HEALTH QUESTIONNAIRE - PHQ9
SUM OF ALL RESPONSES TO PHQ9 QUESTIONS 1 & 2: 0
SUM OF ALL RESPONSES TO PHQ QUESTIONS 1-9: 0
1. LITTLE INTEREST OR PLEASURE IN DOING THINGS: 0
2. FEELING DOWN, DEPRESSED OR HOPELESS: 0
SUM OF ALL RESPONSES TO PHQ QUESTIONS 1-9: 0

## 2018-11-30 ASSESSMENT — ENCOUNTER SYMPTOMS
SORE THROAT: 0
BLOOD IN STOOL: 0
WHEEZING: 1
COUGH: 1
EYE PAIN: 0
SHORTNESS OF BREATH: 0

## 2018-11-30 NOTE — PROGRESS NOTES
Visit Information    Have you changed or started any medications since your last visit including any over-the-counter medicines, vitamins, or herbal medicines? no   Are you having any side effects from any of your medications? -  no  Have you stopped taking any of your medications? Is so, why? -  no    Have you seen any other physician or provider since your last visit? No  Have you had any other diagnostic tests since your last visit? No  Have you been seen in the emergency room and/or had an admission to a hospital since we last saw you? Have you had your routine dental cleaning in the past 6 months? no    Have you activated your [employer] account? If not, what are your barriers?  Yes     Patient Care Team:  Anastasiya Buckley MD as PCP - General (Family Medicine)  Anastasiya Buckley MD as PCP - [facility] Attributed Provider    Medical History Review  Past Medical, Family, and Social History reviewed and does not contribute to the patient presenting condition    Health Maintenance   Topic Date Due    Hepatitis C screen  1960    HIV screen  04/15/1977    DTaP/Tdap/Td vaccine (1 - Tdap) 04/15/1981    Shingles Vaccine (1 of 2 - 2 Dose Series) 04/15/2012    Flu vaccine (1) 09/01/2018    Lipid screen  03/22/2023    Colon cancer screen colonoscopy  04/06/2028

## 2018-11-30 NOTE — PROGRESS NOTES
Comment: socially       Current Outpatient Prescriptions:     predniSONE (DELTASONE) 20 MG tablet, Take 2 tablets by mouth daily for 5 days, Disp: 10 tablet, Rfl: 0    fluticasone (FLONASE) 50 MCG/ACT nasal spray, 2 sprays by Nasal route daily, Disp: 1 Bottle, Rfl: 0    albuterol sulfate HFA (PROAIR HFA) 108 (90 Base) MCG/ACT inhaler, Inhale 2 puffs into the lungs every 6 hours as needed for Wheezing, Disp: 1 Inhaler, Rfl: 3    fluticasone-salmeterol (ADVAIR DISKUS) 250-50 MCG/DOSE AEPB, Inhale 1 puff into the lungs every 12 hours, Disp: 180 each, Rfl: 3    omeprazole (PRILOSEC) 20 MG delayed release capsule, Take 1 capsule by mouth 2 times daily, Disp: 180 capsule, Rfl: 1    SUMAtriptan (IMITREX) 100 MG tablet, Take 1 tablet by mouth once as needed for Migraine, Disp: 9 tablet, Rfl: 0    guaiFENesin (MUCINEX) 600 MG extended release tablet, Take 1 tablet by mouth 2 times daily, Disp: 20 tablet, Rfl: 0    Subjective:     Review of Systems   Constitutional: Negative for appetite change, diaphoresis, fever and unexpected weight change. HENT: Positive for congestion. Negative for ear pain and sore throat. Eyes: Negative for pain. Respiratory: Positive for cough and wheezing. Negative for shortness of breath. Cardiovascular: Negative for chest pain and leg swelling. Gastrointestinal: Negative for blood in stool. Musculoskeletal: Negative for gait problem and myalgias. Skin: Negative for pallor and rash. Neurological: Negative for seizures and headaches. Psychiatric/Behavioral: Negative for dysphoric mood and suicidal ideas. Objective:     /80   Pulse 57   Temp 96.7 °F (35.9 °C)   Resp 16   Ht 5' 10.98\" (1.803 m)   Wt 181 lb 6.4 oz (82.3 kg)   BMI 25.31 kg/m²     Physical Exam   Constitutional: He is oriented to person, place, and time. He appears well-developed.    HENT:   Right Ear: Tympanic membrane and external ear normal.   Left Ear: Tympanic membrane and external ear normal.   Mouth/Throat: No oropharyngeal exudate. Eyes: Conjunctivae and EOM are normal.   Neck: Normal range of motion. Cardiovascular: Normal heart sounds. No murmur heard. Pulmonary/Chest: Effort normal and breath sounds normal. No respiratory distress. He has no wheezes. Lymphadenopathy:     He has no cervical adenopathy. Neurological: He is alert and oriented to person, place, and time. Skin: He is not diaphoretic. Psychiatric: He has a normal mood and affect. His behavior is normal. Judgment and thought content normal.       Assessment & Plan:      1. Mild intermittent asthma with exacerbation  Likely viral.  Rx for prednisone for asthma exacerbation, continue albuterol every 4-6 hours as needed. Discussed potential side effects of steroids include avascular necrosis, osteoporosis, elevated blood sugars, elevated blood pressure, insomnia, mood changes, vision changes. - predniSONE (DELTASONE) 20 MG tablet; Take 2 tablets by mouth daily for 5 days  Dispense: 10 tablet; Refill: 0    2. DORA (middle ear effusion), bilateral  - fluticasone (FLONASE) 50 MCG/ACT nasal spray; 2 sprays by Nasal route daily  Dispense: 1 Bottle; Refill: 0    3. Needs flu shot  - INFLUENZA, QUADV, 3 YRS AND OLDER, IM, PF, PREFILL SYR OR SDV, 0.5ML (FLUZONE QUADV, PF)    Due for annual physical, needs to follow up with PCP    Call or return to clinic prn if these symptoms worsen or fail to improve as anticipated. I have reviewed the instructions with the patient, answering all questions to their satisfaction.     Electronically signed by Sanjay Fitzpatrick MD on 11/30/2018 at 8:57 AM

## 2019-03-07 ENCOUNTER — PATIENT MESSAGE (OUTPATIENT)
Dept: FAMILY MEDICINE CLINIC | Age: 59
End: 2019-03-07

## 2019-03-08 RX ORDER — OMEPRAZOLE 20 MG/1
20 CAPSULE, DELAYED RELEASE ORAL 2 TIMES DAILY
Qty: 180 CAPSULE | Refills: 1 | Status: SHIPPED | OUTPATIENT
Start: 2019-03-08 | End: 2020-02-19

## 2019-03-19 ENCOUNTER — OFFICE VISIT (OUTPATIENT)
Dept: ORTHOPEDIC SURGERY | Age: 59
End: 2019-03-19
Payer: COMMERCIAL

## 2019-03-19 DIAGNOSIS — M25.551 PAIN OF RIGHT HIP JOINT: ICD-10-CM

## 2019-03-19 DIAGNOSIS — M16.10 HIP ARTHRITIS: ICD-10-CM

## 2019-03-19 DIAGNOSIS — M25.551 RIGHT HIP PAIN: Primary | ICD-10-CM

## 2019-03-19 PROCEDURE — 99213 OFFICE O/P EST LOW 20 MIN: CPT | Performed by: ORTHOPAEDIC SURGERY

## 2019-03-22 ENCOUNTER — PATIENT MESSAGE (OUTPATIENT)
Dept: FAMILY MEDICINE CLINIC | Age: 59
End: 2019-03-22

## 2019-03-26 ENCOUNTER — OFFICE VISIT (OUTPATIENT)
Dept: FAMILY MEDICINE CLINIC | Age: 59
End: 2019-03-26
Payer: COMMERCIAL

## 2019-03-26 VITALS
WEIGHT: 174 LBS | DIASTOLIC BLOOD PRESSURE: 70 MMHG | BODY MASS INDEX: 24.28 KG/M2 | SYSTOLIC BLOOD PRESSURE: 125 MMHG | OXYGEN SATURATION: 97 % | TEMPERATURE: 97 F | HEART RATE: 58 BPM

## 2019-03-26 DIAGNOSIS — R05.3 PERSISTENT COUGH: Primary | ICD-10-CM

## 2019-03-26 PROCEDURE — 99213 OFFICE O/P EST LOW 20 MIN: CPT | Performed by: FAMILY MEDICINE

## 2019-03-26 RX ORDER — LEVOCETIRIZINE DIHYDROCHLORIDE 5 MG/1
5 TABLET, FILM COATED ORAL NIGHTLY
Qty: 30 TABLET | Refills: 0 | Status: SHIPPED | OUTPATIENT
Start: 2019-03-26 | End: 2019-06-25

## 2019-03-26 ASSESSMENT — PATIENT HEALTH QUESTIONNAIRE - PHQ9
SUM OF ALL RESPONSES TO PHQ QUESTIONS 1-9: 0
SUM OF ALL RESPONSES TO PHQ QUESTIONS 1-9: 0
2. FEELING DOWN, DEPRESSED OR HOPELESS: 0
1. LITTLE INTEREST OR PLEASURE IN DOING THINGS: 0
SUM OF ALL RESPONSES TO PHQ9 QUESTIONS 1 & 2: 0

## 2019-03-29 ENCOUNTER — HOSPITAL ENCOUNTER (OUTPATIENT)
Dept: GENERAL RADIOLOGY | Age: 59
Discharge: HOME | End: 2019-03-31
Payer: COMMERCIAL

## 2019-03-29 ENCOUNTER — HOSPITAL ENCOUNTER (OUTPATIENT)
Age: 59
Discharge: HOME | End: 2019-03-31
Payer: COMMERCIAL

## 2019-03-29 DIAGNOSIS — R05.3 PERSISTENT COUGH: ICD-10-CM

## 2019-03-29 PROCEDURE — 71046 X-RAY EXAM CHEST 2 VIEWS: CPT

## 2019-04-04 ENCOUNTER — HOSPITAL ENCOUNTER (OUTPATIENT)
Dept: INTERVENTIONAL RADIOLOGY/VASCULAR | Age: 59
Discharge: HOME | End: 2019-04-06
Payer: COMMERCIAL

## 2019-04-04 VITALS — HEART RATE: 63 BPM | DIASTOLIC BLOOD PRESSURE: 77 MMHG | SYSTOLIC BLOOD PRESSURE: 118 MMHG | RESPIRATION RATE: 16 BRPM

## 2019-04-04 DIAGNOSIS — M16.10 HIP ARTHRITIS: ICD-10-CM

## 2019-04-04 DIAGNOSIS — M25.551 RIGHT HIP PAIN: ICD-10-CM

## 2019-04-04 PROCEDURE — 6360000002 HC RX W HCPCS: Performed by: ORTHOPAEDIC SURGERY

## 2019-04-04 PROCEDURE — 77002 NEEDLE LOCALIZATION BY XRAY: CPT

## 2019-04-04 PROCEDURE — 6360000004 HC RX CONTRAST MEDICATION: Performed by: RADIOLOGY

## 2019-04-04 PROCEDURE — 20610 DRAIN/INJ JOINT/BURSA W/O US: CPT

## 2019-04-04 PROCEDURE — 2709999900 IR ARTHR/ASP/INJ MAJOR JT/BURSA RIGHT WO US

## 2019-04-04 PROCEDURE — 2500000003 HC RX 250 WO HCPCS: Performed by: ORTHOPAEDIC SURGERY

## 2019-04-04 RX ORDER — LIDOCAINE HYDROCHLORIDE 10 MG/ML
4 INJECTION, SOLUTION INFILTRATION; PERINEURAL ONCE
Status: COMPLETED | OUTPATIENT
Start: 2019-04-04 | End: 2019-04-04

## 2019-04-04 RX ORDER — BUPIVACAINE HYDROCHLORIDE 5 MG/ML
4 INJECTION, SOLUTION EPIDURAL; INTRACAUDAL ONCE
Status: COMPLETED | OUTPATIENT
Start: 2019-04-04 | End: 2019-04-04

## 2019-04-04 RX ORDER — METHYLPREDNISOLONE ACETATE 40 MG/ML
80 INJECTION, SUSPENSION INTRA-ARTICULAR; INTRALESIONAL; INTRAMUSCULAR; SOFT TISSUE ONCE
Status: COMPLETED | OUTPATIENT
Start: 2019-04-04 | End: 2019-04-04

## 2019-04-04 RX ADMIN — METHYLPREDNISOLONE ACETATE 80 MG: 40 INJECTION, SUSPENSION INTRA-ARTICULAR; INTRALESIONAL; INTRAMUSCULAR; SOFT TISSUE at 11:40

## 2019-04-04 RX ADMIN — BUPIVACAINE HYDROCHLORIDE 20 MG: 5 INJECTION, SOLUTION EPIDURAL; INTRACAUDAL; PERINEURAL at 11:39

## 2019-04-04 RX ADMIN — IOPAMIDOL 5 ML: 612 INJECTION, SOLUTION INTRATHECAL at 11:36

## 2019-04-04 RX ADMIN — LIDOCAINE HYDROCHLORIDE 4 ML: 10 INJECTION, SOLUTION EPIDURAL; INFILTRATION; INTRACAUDAL; PERINEURAL at 11:41

## 2019-04-04 NOTE — OP NOTE
Brief Postoperative Note    Destinee Sims  YOB: 1960  [id number]    Pre-operative Diagnosis: Right hip pain    Post-operative Diagnosis: Same    Procedure: Right hip steroid injection    Anesthesia: Local    Surgeons/Assistants: Swathi Mahan MD    Estimated Blood Loss: less than 50     Complications: None    Specimens: Was Not Obtained    Findings: Needle placed into right hip joint, with lidocaine, bupivicaine, and depo-medrol injected into the joint. Tolerated well.      Electronically signed by Rodríguez Torres MD on 4/4/2019 at 1:38 PM

## 2019-04-30 ENCOUNTER — PATIENT MESSAGE (OUTPATIENT)
Dept: FAMILY MEDICINE CLINIC | Age: 59
End: 2019-04-30

## 2019-04-30 DIAGNOSIS — R05.3 PERSISTENT COUGH: Primary | ICD-10-CM

## 2019-04-30 DIAGNOSIS — J45.21 MILD INTERMITTENT ASTHMA WITH EXACERBATION: ICD-10-CM

## 2019-05-01 ENCOUNTER — PATIENT MESSAGE (OUTPATIENT)
Dept: FAMILY MEDICINE CLINIC | Age: 59
End: 2019-05-01

## 2019-05-02 NOTE — TELEPHONE ENCOUNTER
From: Loy Charles  To: Amy Temple MD  Sent: 5/1/2019 1:09 PM EDT  Subject: Non-Urgent Medical Question    I do take Ventolin now as needed

## 2019-06-18 ENCOUNTER — EMPLOYEE WELLNESS (OUTPATIENT)
Dept: OTHER | Age: 59
End: 2019-06-18

## 2019-06-18 LAB
CHOLESTEROL/HDL RATIO: 4.2
CHOLESTEROL: 175 MG/DL
GLUCOSE BLD-MCNC: 94 MG/DL (ref 70–99)
HDLC SERPL-MCNC: 42 MG/DL
LDL CHOLESTEROL: 103 MG/DL (ref 0–130)
PATIENT FASTING?: YES
TRIGL SERPL-MCNC: 148 MG/DL
VLDLC SERPL CALC-MCNC: NORMAL MG/DL (ref 1–30)

## 2019-06-21 RX ORDER — SUMATRIPTAN 100 MG/1
100 TABLET, FILM COATED ORAL
Qty: 9 TABLET | Refills: 0 | Status: SHIPPED | OUTPATIENT
Start: 2019-06-21 | End: 2019-06-25

## 2019-06-25 ENCOUNTER — OFFICE VISIT (OUTPATIENT)
Dept: PULMONOLOGY | Age: 59
End: 2019-06-25
Payer: COMMERCIAL

## 2019-06-25 VITALS — HEART RATE: 56 BPM | HEIGHT: 71 IN | WEIGHT: 171 LBS | OXYGEN SATURATION: 99 % | BODY MASS INDEX: 23.94 KG/M2

## 2019-06-25 VITALS
OXYGEN SATURATION: 98 % | HEART RATE: 56 BPM | HEIGHT: 70 IN | BODY MASS INDEX: 24.48 KG/M2 | DIASTOLIC BLOOD PRESSURE: 80 MMHG | RESPIRATION RATE: 12 BRPM | SYSTOLIC BLOOD PRESSURE: 137 MMHG | WEIGHT: 171 LBS

## 2019-06-25 DIAGNOSIS — J45.30 POORLY CONTROLLED MILD PERSISTENT ASTHMA: Primary | ICD-10-CM

## 2019-06-25 DIAGNOSIS — J45.909 ASTHMA IN ADULT, UNSPECIFIED ASTHMA SEVERITY, UNCOMPLICATED: Primary | ICD-10-CM

## 2019-06-25 DIAGNOSIS — Z91.09 MULTIPLE ENVIRONMENTAL ALLERGIES: ICD-10-CM

## 2019-06-25 DIAGNOSIS — R05.3 CHRONIC COUGH: ICD-10-CM

## 2019-06-25 PROCEDURE — 99214 OFFICE O/P EST MOD 30 MIN: CPT | Performed by: INTERNAL MEDICINE

## 2019-06-25 PROCEDURE — 94729 DIFFUSING CAPACITY: CPT | Performed by: INTERNAL MEDICINE

## 2019-06-25 PROCEDURE — 94375 RESPIRATORY FLOW VOLUME LOOP: CPT | Performed by: INTERNAL MEDICINE

## 2019-06-25 PROCEDURE — 94726 PLETHYSMOGRAPHY LUNG VOLUMES: CPT | Performed by: INTERNAL MEDICINE

## 2019-06-25 RX ORDER — MONTELUKAST SODIUM 10 MG/1
10 TABLET ORAL NIGHTLY
Qty: 30 TABLET | Refills: 11 | Status: SHIPPED | OUTPATIENT
Start: 2019-06-25 | End: 2020-12-04 | Stop reason: ALTCHOICE

## 2019-06-25 RX ORDER — SUMATRIPTAN 100 MG/1
100 TABLET, FILM COATED ORAL DAILY PRN
COMMUNITY
End: 2019-10-16 | Stop reason: SDUPTHER

## 2019-06-25 ASSESSMENT — ENCOUNTER SYMPTOMS
ALLERGIC/IMMUNOLOGIC NEGATIVE: 1
GASTROINTESTINAL NEGATIVE: 1
COUGH: 1
EYES NEGATIVE: 1

## 2019-06-25 NOTE — PROGRESS NOTES
needed for Migraine      montelukast (SINGULAIR) 10 MG tablet Take 1 tablet by mouth nightly 30 tablet 11    mometasone (ASMANEX 30 METERED DOSES) 220 MCG/INH inhaler Inhale 1 puff into the lungs daily 1 Inhaler 3    ADVAIR DISKUS 250-50 MCG/DOSE AEPB INHALE 1 PUFF EVERY 12 HOURS 180 each 3    omeprazole (PRILOSEC) 20 MG delayed release capsule TAKE 1 CAPSULE BY MOUTH 2 TIMES DAILY 180 capsule 1    albuterol sulfate HFA (PROAIR HFA) 108 (90 Base) MCG/ACT inhaler Inhale 2 puffs into the lungs every 6 hours as needed for Wheezing 1 Inhaler 3     No current facility-administered medications for this visit. Family History   Problem Relation Age of Onset    No Known Problems Mother     Diabetes Father        Social History     Tobacco Use    Smoking status: Never Smoker    Smokeless tobacco: Never Used   Substance Use Topics    Alcohol use: Yes     Comment: socially    Drug use: No       Review of Systems   Constitutional: Negative. HENT: Negative. Eyes: Negative. Respiratory: Positive for cough. Cardiovascular: Negative. Gastrointestinal: Negative. Endocrine: Negative. Genitourinary: Negative. Musculoskeletal: Negative. Skin: Negative. Allergic/Immunologic: Negative. Neurological: Negative. Psychiatric/Behavioral: Negative. All other systems reviewed and are negative. Objective:     Physical Exam   Constitutional: He is oriented to person, place, and time. He appears well-developed and well-nourished. HENT:   Head: Normocephalic and atraumatic. Right Ear: External ear normal.   Left Ear: External ear normal.   Nose: Nose normal.   Mouth/Throat: Oropharynx is clear and moist. No oropharyngeal exudate. Eyes: Conjunctivae are normal. No scleral icterus. Neck: Neck supple. No JVD present. No tracheal deviation present. No thyromegaly present. Cardiovascular: Normal rate, regular rhythm and normal heart sounds. Exam reveals no gallop.    No murmur heard.  Pulmonary/Chest: Effort normal. No respiratory distress. He has no wheezes. He has no rales. He exhibits no tenderness. No coughing noted during the interview. Abdominal: Soft. There is no tenderness. Musculoskeletal: He exhibits no edema. Lymphadenopathy:     He has no cervical adenopathy. Neurological: He is alert and oriented to person, place, and time. Skin: Skin is warm and dry. Nursing note and vitals reviewed. Wt Readings from Last 3 Encounters:   06/25/19 171 lb (77.6 kg)   06/25/19 171 lb (77.6 kg)   03/26/19 174 lb (78.9 kg)       Results for orders placed or performed in visit on 06/18/19   [facility] Screen   Result Value Ref Range    Patient Fasting? YES     Cholesterol 175 <200 mg/dL    HDL 42 >40 mg/dL    LDL Cholesterol 103 0 - 130 mg/dL    Chol/HDL Ratio 4.2 <5    Triglycerides 148 <150 mg/dL    VLDL NOT REPORTED 1 - 30 mg/dL    Glucose 94 70 - 99 mg/dL       Assessment:      1. Poorly controlled mild persistent asthma    2. Multiple environmental allergies    3. Chronic cough          Plan:      1. Cough likely a manifestation of reactive airways disease given history of same and moderately elevated FENO (suggests airways inflammation). 2. Discontinue Advair and substitute Asmanex 1 puff daily  3. Add Singulair 10 mg nightly. 4. Avoid environmental triggers. 5. Albuterol as needed. 6. If cough worse, consider switching to Breo (once daily administration). 7. Discussed strategies for management of asthma including exercise-induced. 8. Return in 6 weeks. 9. Thanks for the kind referral.  We will keep you posted as the work-up proceeds. Please call with questions or problems.       Electronically signed by Georgina Perrin DO on 6/25/2019 at 5:12 PM

## 2019-07-01 VITALS — BODY MASS INDEX: 22.33 KG/M2 | WEIGHT: 160 LBS

## 2019-08-09 ENCOUNTER — OFFICE VISIT (OUTPATIENT)
Dept: PULMONOLOGY | Age: 59
End: 2019-08-09
Payer: COMMERCIAL

## 2019-08-09 VITALS
OXYGEN SATURATION: 97 % | WEIGHT: 173.2 LBS | HEART RATE: 42 BPM | BODY MASS INDEX: 24.25 KG/M2 | SYSTOLIC BLOOD PRESSURE: 128 MMHG | HEIGHT: 71 IN | DIASTOLIC BLOOD PRESSURE: 68 MMHG

## 2019-08-09 DIAGNOSIS — Z91.09 MULTIPLE ENVIRONMENTAL ALLERGIES: ICD-10-CM

## 2019-08-09 DIAGNOSIS — R05.3 CHRONIC COUGH: ICD-10-CM

## 2019-08-09 DIAGNOSIS — J45.30 POORLY CONTROLLED MILD PERSISTENT ASTHMA: Primary | ICD-10-CM

## 2019-08-09 PROCEDURE — 99213 OFFICE O/P EST LOW 20 MIN: CPT | Performed by: INTERNAL MEDICINE

## 2019-08-09 ASSESSMENT — ENCOUNTER SYMPTOMS
COUGH: 1
EYES NEGATIVE: 1

## 2019-08-10 NOTE — PROGRESS NOTES
Subjective:      Patient ID: Ivan Muniz is a 64 y.o. male. HPI  Follow-up visit for asthma. Since his last visit 6 weeks ago his symptoms of shortness of breath and wheezing have improved. Currently on Asmanex and Singulair. Only uses albuterol prior to running. He continues to report a dry hacking cough. Evaded phenol suggested ongoing airways inflammation. Recently had been on Advair but using only 1 puff daily. Denies nocturnal symptoms    Review of Systems   Constitutional: Negative. HENT: Negative. Eyes: Negative. Respiratory: Positive for cough. Cardiovascular: Negative. All other systems reviewed and are negative. Objective:     Physical Exam   Constitutional: He is oriented to person, place, and time. He appears well-developed and well-nourished. HENT:   Mouth/Throat: No oropharyngeal exudate. Eyes: Conjunctivae are normal. No scleral icterus. Neck: Neck supple. No JVD present. No tracheal deviation present. No thyromegaly present. Cardiovascular: Normal rate, regular rhythm and normal heart sounds. Exam reveals no gallop. No murmur heard. Pulmonary/Chest: Effort normal. No respiratory distress. He has no wheezes. He has no rales. He exhibits no tenderness. Abdominal: Soft. There is no tenderness. Musculoskeletal: He exhibits no edema. Lymphadenopathy:     He has no cervical adenopathy. Neurological: He is alert and oriented to person, place, and time. Skin: Skin is warm and dry. Nursing note and vitals reviewed. Wt Readings from Last 3 Encounters:   08/09/19 173 lb 3.2 oz (78.6 kg)   06/25/19 171 lb (77.6 kg)   06/25/19 171 lb (77.6 kg)          Results for orders placed or performed in visit on 06/18/19   [facility] Health Screen   Result Value Ref Range    Patient Fasting?  YES     Cholesterol 175 <200 mg/dL    HDL 42 >40 mg/dL    LDL Cholesterol 103 0 - 130 mg/dL    Chol/HDL Ratio 4.2 <5    Triglycerides 148 <150 mg/dL    VLDL NOT REPORTED 1 - 30 mg/dL    Glucose 94 70 - 99 mg/dL       Assessment:         1. Poorly controlled mild persistent asthma    2. Multiple environmental allergies    3. Chronic cough          Plan:      1. Believe cough is a manifestation of asthma. Escalate treatment with Asmanex 1 puff twice daily. 2. Cont Singulair. 3. Albuterol as needed. 4. Discussed notion of escalation and de-escalation of treatment. 5. RTC 1 yr. Sooner if symptoms not better.      Electronically signed by Suzette Resendez DO on 8/9/2019at 10:43 PM

## 2019-09-05 ENCOUNTER — PATIENT MESSAGE (OUTPATIENT)
Dept: PULMONOLOGY | Age: 59
End: 2019-09-05

## 2019-09-05 NOTE — TELEPHONE ENCOUNTER
From: Bladimir Abdullahi  To: Jesika Noble DO  Sent: 9/5/2019 6:42 AM EDT  Subject: Prescription Question    Good morning Dr. Carrington Painter, I called in to renew my Asmonex at [facility] but the recording stated it was too early to renew my prescription. I left a message for them to get in contact you because you had changed my asmonex from one time per day to two times per day. I have not heard back from the pharmacy, have they contacted you?   Tone Sanford

## 2019-10-14 ENCOUNTER — OFFICE VISIT (OUTPATIENT)
Dept: PULMONOLOGY | Age: 59
End: 2019-10-14
Payer: COMMERCIAL

## 2019-10-14 VITALS
TEMPERATURE: 96.9 F | HEIGHT: 71 IN | SYSTOLIC BLOOD PRESSURE: 147 MMHG | HEART RATE: 51 BPM | BODY MASS INDEX: 24.26 KG/M2 | RESPIRATION RATE: 16 BRPM | DIASTOLIC BLOOD PRESSURE: 79 MMHG | OXYGEN SATURATION: 100 % | WEIGHT: 173.28 LBS

## 2019-10-14 DIAGNOSIS — R05.3 CHRONIC COUGH: ICD-10-CM

## 2019-10-14 DIAGNOSIS — J30.9 ALLERGIC RHINITIS, UNSPECIFIED SEASONALITY, UNSPECIFIED TRIGGER: ICD-10-CM

## 2019-10-14 DIAGNOSIS — K21.9 GASTROESOPHAGEAL REFLUX DISEASE, ESOPHAGITIS PRESENCE NOT SPECIFIED: ICD-10-CM

## 2019-10-14 DIAGNOSIS — J45.30 POORLY CONTROLLED MILD PERSISTENT ASTHMA: Primary | ICD-10-CM

## 2019-10-14 PROCEDURE — 99213 OFFICE O/P EST LOW 20 MIN: CPT | Performed by: INTERNAL MEDICINE

## 2019-10-14 RX ORDER — ALBUTEROL SULFATE 90 UG/1
2 AEROSOL, METERED RESPIRATORY (INHALATION) EVERY 6 HOURS PRN
Qty: 1 INHALER | Refills: 11 | Status: SHIPPED | OUTPATIENT
Start: 2019-10-14 | End: 2020-09-14 | Stop reason: SDUPTHER

## 2019-10-14 RX ORDER — CROMOLYN SODIUM 5.2 MG
1 AEROSOL, SPRAY WITH PUMP (ML) NASAL 3 TIMES DAILY
Qty: 1 BOTTLE | Refills: 3 | Status: SHIPPED | OUTPATIENT
Start: 2019-10-14 | End: 2021-11-05 | Stop reason: ALTCHOICE

## 2019-10-14 ASSESSMENT — SLEEP AND FATIGUE QUESTIONNAIRES
HOW LIKELY ARE YOU TO NOD OFF OR FALL ASLEEP WHILE WATCHING TV: 0
HOW LIKELY ARE YOU TO NOD OFF OR FALL ASLEEP WHILE LYING DOWN TO REST IN THE AFTERNOON WHEN CIRCUMSTANCES PERMIT: 0
ESS TOTAL SCORE: 0
HOW LIKELY ARE YOU TO NOD OFF OR FALL ASLEEP WHEN YOU ARE A PASSENGER IN A CAR FOR AN HOUR WITHOUT A BREAK: 0
HOW LIKELY ARE YOU TO NOD OFF OR FALL ASLEEP WHILE SITTING AND READING: 0
HOW LIKELY ARE YOU TO NOD OFF OR FALL ASLEEP WHILE SITTING AND TALKING TO SOMEONE: 0
HOW LIKELY ARE YOU TO NOD OFF OR FALL ASLEEP IN A CAR, WHILE STOPPED FOR A FEW MINUTES IN TRAFFIC: 0
HOW LIKELY ARE YOU TO NOD OFF OR FALL ASLEEP WHILE SITTING QUIETLY AFTER LUNCH WITHOUT ALCOHOL: 0
HOW LIKELY ARE YOU TO NOD OFF OR FALL ASLEEP WHILE SITTING INACTIVE IN A PUBLIC PLACE: 0

## 2019-10-14 ASSESSMENT — ENCOUNTER SYMPTOMS
EYES NEGATIVE: 1
COUGH: 1

## 2019-10-16 RX ORDER — SUMATRIPTAN 100 MG/1
100 TABLET, FILM COATED ORAL
Qty: 9 TABLET | Refills: 0 | Status: SHIPPED | OUTPATIENT
Start: 2019-10-16 | End: 2022-03-14

## 2019-11-13 DIAGNOSIS — Z12.5 PROSTATE CANCER SCREENING: Primary | ICD-10-CM

## 2019-11-14 ENCOUNTER — OFFICE VISIT (OUTPATIENT)
Dept: PRIMARY CARE CLINIC | Age: 59
End: 2019-11-14
Payer: COMMERCIAL

## 2019-11-14 VITALS
DIASTOLIC BLOOD PRESSURE: 81 MMHG | HEART RATE: 64 BPM | SYSTOLIC BLOOD PRESSURE: 141 MMHG | OXYGEN SATURATION: 97 % | WEIGHT: 178 LBS | BODY MASS INDEX: 24.84 KG/M2

## 2019-11-14 DIAGNOSIS — T15.91XA FB EYE, RIGHT, INITIAL ENCOUNTER: Primary | ICD-10-CM

## 2019-11-14 PROCEDURE — 99202 OFFICE O/P NEW SF 15 MIN: CPT | Performed by: INTERNAL MEDICINE

## 2019-11-14 ASSESSMENT — ENCOUNTER SYMPTOMS
EYE DISCHARGE: 0
BLURRED VISION: 0

## 2019-11-14 ASSESSMENT — VISUAL ACUITY: OU: 1

## 2019-11-15 ENCOUNTER — HOSPITAL ENCOUNTER (OUTPATIENT)
Age: 59
Setting detail: SPECIMEN
Discharge: HOME | End: 2019-11-15
Payer: COMMERCIAL

## 2019-11-15 DIAGNOSIS — Z12.5 PROSTATE CANCER SCREENING: ICD-10-CM

## 2019-11-15 LAB — PROSTATE SPECIFIC ANTIGEN: 1.25 UG/L

## 2019-11-25 ENCOUNTER — OFFICE VISIT (OUTPATIENT)
Dept: UROLOGY | Age: 59
End: 2019-11-25
Payer: COMMERCIAL

## 2019-11-25 VITALS
HEIGHT: 71 IN | DIASTOLIC BLOOD PRESSURE: 80 MMHG | TEMPERATURE: 97.7 F | WEIGHT: 179.6 LBS | SYSTOLIC BLOOD PRESSURE: 118 MMHG | BODY MASS INDEX: 25.15 KG/M2

## 2019-11-25 DIAGNOSIS — R35.1 NOCTURIA: ICD-10-CM

## 2019-11-25 DIAGNOSIS — Z12.5 PROSTATE CANCER SCREENING: Primary | ICD-10-CM

## 2019-11-25 PROCEDURE — 99213 OFFICE O/P EST LOW 20 MIN: CPT | Performed by: UROLOGY

## 2019-11-25 ASSESSMENT — ENCOUNTER SYMPTOMS
DIARRHEA: 0
VOMITING: 0
NAUSEA: 0
ABDOMINAL PAIN: 0
EYE REDNESS: 0
CONSTIPATION: 0
COUGH: 1
EYE PAIN: 0
SHORTNESS OF BREATH: 0
WHEEZING: 0
BACK PAIN: 0

## 2020-01-06 ENCOUNTER — OFFICE VISIT (OUTPATIENT)
Dept: FAMILY MEDICINE CLINIC | Age: 60
End: 2020-01-06
Payer: COMMERCIAL

## 2020-01-06 VITALS
OXYGEN SATURATION: 98 % | BODY MASS INDEX: 25.66 KG/M2 | HEART RATE: 58 BPM | TEMPERATURE: 97.3 F | SYSTOLIC BLOOD PRESSURE: 135 MMHG | DIASTOLIC BLOOD PRESSURE: 81 MMHG | WEIGHT: 184 LBS

## 2020-01-06 PROCEDURE — 90732 PPSV23 VACC 2 YRS+ SUBQ/IM: CPT | Performed by: FAMILY MEDICINE

## 2020-01-06 PROCEDURE — 90471 IMMUNIZATION ADMIN: CPT | Performed by: FAMILY MEDICINE

## 2020-01-06 PROCEDURE — 99213 OFFICE O/P EST LOW 20 MIN: CPT | Performed by: FAMILY MEDICINE

## 2020-01-06 ASSESSMENT — PATIENT HEALTH QUESTIONNAIRE - PHQ9
SUM OF ALL RESPONSES TO PHQ9 QUESTIONS 1 & 2: 0
2. FEELING DOWN, DEPRESSED OR HOPELESS: 0
1. LITTLE INTEREST OR PLEASURE IN DOING THINGS: 0
SUM OF ALL RESPONSES TO PHQ QUESTIONS 1-9: 0
SUM OF ALL RESPONSES TO PHQ QUESTIONS 1-9: 0

## 2020-01-06 NOTE — PROGRESS NOTES
General FM note    Clifford Cosme is a 64 y.o. male who presents today for follow up on his  medical conditions as noted below.   Clifford Cosme is c/o of   Chief Complaint   Patient presents with    Cough       Patient Active Problem List:     Right hip pain     Arthritis of right hip     Chronic midline low back pain without sciatica     Tendonitis of knee, right     Acute pain of right knee     Past Medical History:   Diagnosis Date    Arthritis     in hip    Asthma     GERD (gastroesophageal reflux disease)       Past Surgical History:   Procedure Laterality Date    COLONOSCOPY  04/06/2018    polyp removed    COLONOSCOPY N/A 4/6/2018    COLONOSCOPY POLYPECTOMY SNARE/COLD BIOPSY performed by Adriana Carr MD at [address] [place] [address] VASECTOMY       Family History   Problem Relation Age of Onset    No Known Problems Mother     Diabetes Father      Current Outpatient Medications   Medication Sig Dispense Refill    beclomethasone (QVAR) 40 MCG/ACT inhaler Inhale 2 puffs into the lungs 2 times daily 1 Inhaler 0    zoster recombinant adjuvanted vaccine (SHINGRIX) 50 MCG/0.5ML SUSR injection Inject 0.5 mLs into the muscle See Admin Instructions 1 dose now and repeat in 2-6 months 0.5 mL 1    SUMAtriptan (IMITREX) 100 MG tablet TAKE 1 TABLET BY MOUTH ONCE AS NEEDED FOR MIGRAINE 9 tablet 0    albuterol sulfate HFA (VENTOLIN HFA) 108 (90 Base) MCG/ACT inhaler Inhale 2 puffs into the lungs every 6 hours as needed for Wheezing or Shortness of Breath (cough) 1 Inhaler 11    cromolyn (NASALCROM) 5.2 MG/ACT nasal spray 1 spray by Nasal route 3 times daily 1 Bottle 3    mometasone (ASMANEX, 60 METERED DOSES,) 220 MCG/INH inhaler Inhale 1 puff into the lungs 2 times daily 1 Inhaler 11    montelukast (SINGULAIR) 10 MG tablet Take 1 tablet by mouth nightly 30 tablet 11    omeprazole (PRILOSEC) 20 MG delayed release capsule TAKE 1 CAPSULE BY MOUTH 2 TIMES DAILY 180 capsule 1    albuterol sulfate HFA (PROAIR HFA) 108 (90 Base) MCG/ACT inhaler Inhale 2 puffs into the lungs every 6 hours as needed for Wheezing 1 Inhaler 3     No current facility-administered medications for this visit. ALLERGIES:    Allergies   Allergen Reactions    Ciprofloxacin Dermatitis       Social History     Tobacco Use    Smoking status: Never Smoker    Smokeless tobacco: Never Used   Substance Use Topics    Alcohol use: Yes     Comment: socially      Body mass index is 25.66 kg/m². /81   Pulse 58   Temp 97.3 °F (36.3 °C) (Oral)   Wt 184 lb (83.5 kg)   SpO2 98%   BMI 25.66 kg/m²     Subjective:      HPI    81-year-old male coming today for ongoing cough. Patient tells me that he has a cough that just doing today. He does not have any cough at night. But he has a cough thoroughly daily more so severe at times. He has been seen by a pulmonologist he is on multiple medications including an albuterol inhaler which he has been using before exercising and also Asmanex which has helped but then again he just coughs. He has allergies to cats and he has 1 cat at the house again which he has seen an allergist in the past had \"shots against allergies \". The patient is training for a half Ironman. No other concerns. Review of Systems   Constitutional: Negative for fever and unexpected weight change. Respiratory: Negative for shortness of breath. Positive for persistent daily cough. Cardiovascular: Negative for chest pain and leg swelling. Gastrointestinal: Negative for diarrhea, constipation and blood in stool. Endocrine: Negative for polydipsia and polyuria. Genitourinary: Negative for dysuria and hematuria. Musculoskeletal: Negative for back pain and gait problem. Skin: Negative for color change and rash. Objective:   Physical Exam  Constitutional: VS (see above). General appearance: normal development, habitus and attention, no deformities. No distress. Eyes: normal conjunctiva and lids.   CAV: RRR, no RMG. No edema lower extremities. Pulmo: CTA bilateral, no CWR. Skin: no rashes, lesions or ulcers. Musculoskeletal: normal gait. Nails: no clubbing or cyanosis. Psychiatric: alert and oriented to place, time and person. Normal mood and affect. Assessment:       Diagnosis Orders   1. Persistent cough  beclomethasone (QVAR) 40 MCG/ACT inhaler   2. Need for shingles vaccine  zoster recombinant adjuvanted vaccine [facility]) 50 MCG/0.5ML SUSR injection       Plan:   I still believe the patient has allergies. I will start him on additional inhaler expiration 01/20/2020 lot number . He will try it for couple of weeks if this helps with a refill. He will follow-up with specialists as indicated  He will receive a pneumonia shot today. Also prescription for shingles vaccination provided. Call or return to clinic prn if these symptoms worsen or fail to improve as anticipated. I have reviewed the instructions with the patient, answering all questions to his satisfaction. Return in about 6 months (around 7/6/2020), or if symptoms worsen or fail to improve. No orders of the defined types were placed in this encounter. Orders Placed This Encounter   Medications    beclomethasone (QVAR) 40 MCG/ACT inhaler     Sig: Inhale 2 puffs into the lungs 2 times daily     Dispense:  1 Inhaler     Refill:  0    zoster recombinant adjuvanted vaccine (SHINGRIX) 50 MCG/0.5ML SUSR injection     Sig: Inject 0.5 mLs into the muscle See Admin Instructions 1 dose now and repeat in 2-6 months     Dispense:  0.5 mL     Refill:  1       Isai received counseling on the following healthy behaviors: nutrition, exercise and medication adherence  Reviewed prior labs and health maintenance  Continue current medications, diet and exercise. Discussed use, benefit, and side effects of prescribed medications. Barriers to medication compliance addressed.    Patient given educational materials - see patient instructions  Was a self-tracking handout given in paper form or via [employer]? No: .    Requested Prescriptions     Signed Prescriptions Disp Refills    beclomethasone (QVAR) 40 MCG/ACT inhaler 1 Inhaler 0     Sig: Inhale 2 puffs into the lungs 2 times daily    zoster recombinant adjuvanted vaccine (SHINGRIX) 50 MCG/0.5ML SUSR injection 0.5 mL 1     Sig: Inject 0.5 mLs into the muscle See Admin Instructions 1 dose now and repeat in 2-6 months       All patient questions answered. Patient voiced understanding. Quality Measures    Body mass index is 25.66 kg/m². Elevated. Weight control planned discussed daily exercise regimen and Healthy diet and regular exercise. BP: 135/81 Blood pressure is normal. Treatment plan consists of No treatment change needed.     Lab Results   Component Value Date    LDLCHOLESTEROL 103 06/18/2019    (goal LDL reduction with dx if diabetes is 50% LDL reduction)      PHQ Scores 1/6/2020 3/26/2019 11/30/2018 7/6/2017   PHQ2 Score 0 0 0 0   PHQ9 Score 0 0 0 0     Interpretation of Total Score Depression Severity: 1-4 = Minimal depression, 5-9 = Mild depression, 10-14 = Moderate depression, 15-19 = Moderately severe depression, 20-27 = Severe depression     Electronically signed by Minerva Vazquez MD on 1/6/2020 at 8:52 AM       (Please note that portions of this note were completed with a voice recognition program. Efforts were made to edit the dictations but occasionally words are mis-transcribed.)

## 2020-02-19 RX ORDER — OMEPRAZOLE 20 MG/1
CAPSULE, DELAYED RELEASE ORAL
Qty: 180 CAPSULE | Refills: 1 | Status: SHIPPED | OUTPATIENT
Start: 2020-02-19 | End: 2021-02-24

## 2020-04-28 ENCOUNTER — PATIENT MESSAGE (OUTPATIENT)
Dept: FAMILY MEDICINE CLINIC | Age: 60
End: 2020-04-28

## 2020-04-28 NOTE — TELEPHONE ENCOUNTER
From: Cara Arredondo  To: Jett Rao MD  Sent: 4/28/2020 8:39 AM EDT  Subject: Prescription Question    Good morning Dr. Iraj Cr,   The last time I was in you gave me a sample of an inhaler which seems to be working for my cough. I would like to know if you could prescribe this for me? QVAR Redi haler 40 mcg.      Thank you,  Hanna Castillo

## 2020-06-17 ENCOUNTER — HOSPITAL ENCOUNTER (OUTPATIENT)
Dept: GENERAL RADIOLOGY | Age: 60
Discharge: HOME | End: 2020-06-19
Payer: COMMERCIAL

## 2020-06-17 ENCOUNTER — HOSPITAL ENCOUNTER (OUTPATIENT)
Age: 60
Discharge: HOME | End: 2020-06-19
Payer: COMMERCIAL

## 2020-06-17 PROCEDURE — 72100 X-RAY EXAM L-S SPINE 2/3 VWS: CPT

## 2020-06-17 PROCEDURE — 72170 X-RAY EXAM OF PELVIS: CPT

## 2020-06-23 ENCOUNTER — HOSPITAL ENCOUNTER (OUTPATIENT)
Dept: PHYSICAL THERAPY | Facility: CLINIC | Age: 60
Setting detail: THERAPIES SERIES
Discharge: HOME | End: 2020-06-23
Payer: COMMERCIAL

## 2020-06-23 PROCEDURE — 97140 MANUAL THERAPY 1/> REGIONS: CPT

## 2020-06-23 PROCEDURE — 97110 THERAPEUTIC EXERCISES: CPT

## 2020-06-23 PROCEDURE — 97161 PT EVAL LOW COMPLEX 20 MIN: CPT

## 2020-06-23 NOTE — CONSULTS
[x] [facility]    [address] [place] [address]    Phone: [phone]    Fax:  [phone]     Physical Therapy Running Evaluation    Date:  2020  Patient: John Madrid   : 1960  MRN: [id number]  Physician: Dr. Emre Garcia: [employer] ([employer])  Medical Diagnosis: R achilles tendonitis  Rehab Codes: M75.571  Onset date: 20   Next Dr's appt.: prn    Subjective:   CC: Pt reports achilles pain started mid Feb ad has progressively gotten worse. Hurts worse to run faster than slower. It does not hurt to cycle. This is different than before in that it hurts right at the heel and a little higher. I am trying to get ready for a 1/2 IM. Just overdid it. Have been off 1.5 weeks from running and swimming/cycling instead    PMHx: [] Unremarkable [] Diabetes [] HTN  [] Pacemaker   [] MI/Heart Problems [] Cancer [] Arthritis  [] Other:              [x] Refer to full medical chart  In EPIC     Tests: [] X-Ray: [] MRI:  [] none:     Medications: [x] Refer to full medical record [] None [] Other:  Allergies:      [x] Refer to full medical record [] None [] Other:    Working:  [x] Normal Duty  [] Light Duty  [] Off D/T Condition  [] Retired    [] Not Employed    []  Disability  [] Other:           Return to work:     Job/ADL Description:  [employer]    Pain:  [x] Yes  [] No   Location: R achilles and heel   Pain Rating: (0-10 scale) 7-8/10 Worst- when running  3-4/10 Now    Pain altered Tx:  [] Yes  [x] No  Action:  Symptoms:  [] Improving [] Worsening [x] Same  Better:  [] Meds    [x] Ice pack    [] Sit    [] Not running  []Stand    [] Walk    [] Stretching   [x] Other: Bike  Worse: [x] Run    [] Easy    [] Speed work    []Stand    [] Walk    [] Stairs    [] Sit    [x] Other:Stiffens with rest   Sleep: [x] OK    [] Disturbed    Objective:    ROM  ° A/P STRENGTH TESTS (+/-) Left Right Not Tested    Left Right Left Right Ant.  Drawer   [] necessary rehabilitation services.      *PLEASE SIGN ABOVE AND FAX BACK ALL PAGES*

## 2020-06-25 ENCOUNTER — HOSPITAL ENCOUNTER (OUTPATIENT)
Dept: PHYSICAL THERAPY | Facility: CLINIC | Age: 60
Setting detail: THERAPIES SERIES
Discharge: HOME | End: 2020-06-25
Payer: COMMERCIAL

## 2020-06-25 PROCEDURE — 97140 MANUAL THERAPY 1/> REGIONS: CPT

## 2020-06-25 PROCEDURE — 97110 THERAPEUTIC EXERCISES: CPT

## 2020-06-29 ENCOUNTER — HOSPITAL ENCOUNTER (OUTPATIENT)
Dept: PHYSICAL THERAPY | Facility: CLINIC | Age: 60
Setting detail: THERAPIES SERIES
Discharge: HOME | End: 2020-06-29
Payer: COMMERCIAL

## 2020-06-29 PROCEDURE — 97110 THERAPEUTIC EXERCISES: CPT

## 2020-06-29 PROCEDURE — 97140 MANUAL THERAPY 1/> REGIONS: CPT

## 2020-06-29 NOTE — FLOWSHEET NOTE
Gym               Lax ball foot roll x20   x        Hip flexor stretch w/ mild knee flexion 2'ea   x        Burrito gastroc/soleus  stretch 2'ea   x x       Self posterior talar glide x20 black x      Toe yoga x30   x x   2 legged   Foot dome 10x10\"   x x       MOBO    x     Foot rocks x30   x     SL squat fwd/backward x10   x     Weight pass x10 Purple KB x x     Eccentric calf raises 2 sets  x x  TM step   squats x30   x  Chair at knees to cue   Monster walks               Hip thrusts               Step downs           Posterior and lateral   Posterior sling ex           On cable column   Ski jumper lunges               Functional reach               Reverse twisting lunge               RDLs           Retract scaps, one hand over, one hand under   Resisted C skip            Neutral L spine   Front squats           No L ext   Resisted Push Press               Ninja jumps           Soft landings on box; jump up/step down   Depth drops               Depth jumps           Jump down w/ low jose   Med ball cleans           Start on chest, elbows wide   TRX               hip add               Hamstring curl               Lower trap row                                               Other:     Specific Instructions for next treatment:  Glute max ( bridge march, hip thrust) run mechanics        Treatment Charges: Mins Units   []  Modalities     [x]  Ther Exercise 45 3   [x]  Manual Therapy 15 1   []  Ther Activities     []  Aquatics     []  Vasocompression     []  Freeze sleeve     Total Treatment time 60 4       Assessment: [x] Progressing toward goals. Hip extension stiff but full today. Long cycling over the weekend may account for that. Gastroc tension decreased. Achilles still stiff. No more anterior ankle pain with soleus stretching and instead can feel it all at achilles. Pt is very quad dominant. Really worked on mechanics to get hip to hinge so pt could engage glutes.   Very difficult to be stable on MOBO due to lack of hip control [] No change. [] Other:  [] Patient would continue to benefit from skilled physical therapy services in order to: improve ankle ROM, foot intrinsic activation and hip strength so pt can run painfree    STG: (to be met in 5 treatments)  1. ? 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 ? ROM:Normal hip extension and ankle DF knee straight to aide in LE mechanics  3. ? Strength: 5/5 hip strength to aide LE run mechanics  4. ? Function: Able to hold postural corrections made with manual techniques to aide LE mechanics  5. LEFI 70/80  6. Independent with Home Exercise Programs     LTG: (to be met in 10 treatments)  1. Pain 0/10 achilles pain  2. Pt able to demonstrate little to no foot/knee deviation and be able to perform SL squat in a glute dominant fashion  3. LEFI 80/80  4. Able to run as he wishes painfree with acceptable run form to reduce load on calf                    Patient goals:painfree running    Pt. Education:  [] Yes  [] No  [] Reviewed Prior HEP/Ed  Method of Education: [] Verbal  [] Demo  [] Written  Comprehension of Education:  [] Verbalizes understanding. [] Demonstrates understanding. [] Needs review. [] Demonstrates/verbalizes HEP/Ed previously given. Plan: [x] Continue current frequency toward long and short term goals.     [x] Specific Instructions for subsequent treatments: see above      Time In:1700          Time Out:1805    Electronically signed by:  Lindsay Michelle PT

## 2020-07-02 ENCOUNTER — HOSPITAL ENCOUNTER (OUTPATIENT)
Dept: PHYSICAL THERAPY | Facility: CLINIC | Age: 60
Setting detail: THERAPIES SERIES
Discharge: HOME | End: 2020-07-02
Payer: COMMERCIAL

## 2020-07-02 PROCEDURE — 97110 THERAPEUTIC EXERCISES: CPT

## 2020-07-02 PROCEDURE — 97750 PHYSICAL PERFORMANCE TEST: CPT

## 2020-07-02 PROCEDURE — 97140 MANUAL THERAPY 1/> REGIONS: CPT

## 2020-07-02 NOTE — FLOWSHEET NOTE
[] [facility] [address]  [address]  [phone]  F: [phone] [] [address]  [facility]   [address]  P: [phone]  F: [phone] [x] [address] &  Therapy  [address] [facility]  P: [phone]  F: [phone] [] [address]  [facility]   [address]   [place]: [phone]  F: [phone]      Physical Therapy Daily Treatment Note    Date:  2020  Patient Name:  Arcelia Pollock    :  1960  MRN: [id number]  Physician: Dr. Maxine Taylor: [employer] ([employer])  Medical Diagnosis: R achilles tendonitis                  Rehab Codes: M75.571  Onset date: 20                           Next Dr's appt.: prn  Visit# / total visits:      Cancels/No Shows: 0    Subjective:    Pain:  [] Yes  [] No Location: R achilles Pain Rating: (0-10 scale) 1-2/10  Pain altered Tx:  [] No  [] Yes  Action:  Comments:Pt reports still stiff at achilles after sitting a bit  Objective:  Modalities: prn  Manual:  DI Proximal and distal hip flexor release, Glute med, piriformis, sidekick R achilles and medial gastroc, Hypervolt B medial gastoc, R TFL and quad  Precautions: standard     Exercises:  Exercise Reps/ Time Weight/ Level Issued for [facility]   MOBO Y/N Comments   Prone               Flying squirrels               Hip ext (glut max)               [place] hip ext               Supine               2 legged bridges               1 legged bridges               PB hamstring curls           Hips to remain in neutral to ext   [employer]               Sidelying               Clams 90/30 deg               [place] hip abd               Quadruped               [place]key kicks           Bar across pelvis   [place] twist               Gym               Lax ball foot roll x20   x        Hip flexor stretch w/ mild knee flexion 2'ea   x x       Burrito gastroc/soleus  stretch 2'ea   x x       Self posterior talar glide x20 black x      Toe yoga x30   x x   2 legged   Foot dome 10x10\"   x x       MOBO    x     Foot rocks x30   x     SL squat fwd/backward x10   x     Weight pass x10 Purple KB x x     Eccentric calf raises 2 sets  x x  TM step   squats x30   x  Chair at knees to cue   Monster walks               Hip thrusts               Step downs           Posterior and lateral   Posterior sling ex           On cable column   Ski jumper lunges               Functional reach               Reverse twisting lunge               RDLs           Retract scaps, one hand over, one hand under   Resisted C skip            Neutral L spine   Front squats           No L ext   Resisted Push Press               Ninja jumps           Soft landings on box; jump up/step down   Depth drops               Depth jumps           Jump down w/ low jose   Med ball cleans           Start on chest, elbows wide   TRX               hip add               Hamstring curl               Lower trap row                                               Other:     Specific Instructions for next treatment:  Glute max ( bridge march, hip thrust) run mechanics  Video Run Analysis   Warm up:2 min walk   Pace: 11:31  Min/mile (5.2mph)   Duration: 10 minutes    Shoes: [place] guide w/powerstep   Beth: 164spm increased to 172spm    Frontal plane deviations:    Dynamic genu varus    Contralateral pelvic drop    Crossover    Lateral trunk bend           Sagittal plane deviations:     Increased knee extension at initial contact    Limited hip ext from midstance to toe off    Increased thoracic and lumbar extension    Decreased knee flexion in swing phase       Other:      Recommendations:     Increase beth by 7-10%    No crossover    Consider less stability in shoe  Improve posture during run to reduce thoracic extension            Treatment Charges: Mins Units []  Modalities     [x]  Ther Exercise 35 2   [x]  Manual Therapy 15 1   []  Ther Activities     [x]  Physical perf eval 10 1   []  Vasocompression     []  Freeze sleeve     Total Treatment time 60 4       Assessment: [x] Progressing toward goals. Run form flaws as above. After getting pt to relax shoulders and lift heel mechanics improved and he was more able to hit target flor. He is too narrow with step width at IC worse on R than L. Will try pt in a neutral shoe as he is currently in stability with an insert to attempt to aide pt pushing off great toe instead of spinning of lesser toes. [] No change. [] Other:  [] Patient would continue to benefit from skilled physical therapy services in order to: improve ankle ROM, foot intrinsic activation and hip strength so pt can run painfree    STG: (to be met in 5 treatments)  1. ? 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 ? ROM:Normal hip extension and ankle DF knee straight to aide in LE mechanics  3. ? Strength: 5/5 hip strength to aide LE run mechanics  4. ? Function: Able to hold postural corrections made with manual techniques to aide LE mechanics  5. LEFI 70/80  6. Independent with Home Exercise Programs     LTG: (to be met in 10 treatments)  1. Pain 0/10 achilles pain  2. Pt able to demonstrate little to no foot/knee deviation and be able to perform SL squat in a glute dominant fashion  3. LEFI 80/80  4. Able to run as he wishes painfree with acceptable run form to reduce load on calf                    Patient goals:painfree running    Pt. Education:  [] Yes  [] No  [] Reviewed Prior HEP/Ed  Method of Education: [] Verbal  [] Demo  [] Written  Comprehension of Education:  [] Verbalizes understanding. [] Demonstrates understanding. [] Needs review. [] Demonstrates/verbalizes HEP/Ed previously given. Plan: [x] Continue current frequency toward long and short term goals.     [x] Specific Instructions for subsequent treatments: see above      Time In:1805          Time Out:1905    Electronically signed by:  Dorothea Jensen, PT

## 2020-07-07 ENCOUNTER — HOSPITAL ENCOUNTER (OUTPATIENT)
Dept: PHYSICAL THERAPY | Facility: CLINIC | Age: 60
Setting detail: THERAPIES SERIES
Discharge: HOME | End: 2020-07-07
Payer: COMMERCIAL

## 2020-07-07 PROCEDURE — 97110 THERAPEUTIC EXERCISES: CPT

## 2020-07-07 PROCEDURE — 97140 MANUAL THERAPY 1/> REGIONS: CPT

## 2020-07-07 PROCEDURE — 97112 NEUROMUSCULAR REEDUCATION: CPT

## 2020-07-07 NOTE — FLOWSHEET NOTE
[] [address]  [address]  [phone]  F: [phone] [] [address]  [facility]   [address]  P: [phone]  F: [phone] [x] [address] &  Therapy  [facility]  P: [phone]  F: [phone] [] [address]  [facility]   [address]   [place]: [phone]  F: [phone]      Physical Therapy Daily Treatment Note    Date:  2020  Patient Name:  Shayy Navas    :  1960  MRN: [id number]  Physician: Dr. Gregoria Roberts: MMO (Unlimited)  Medical Diagnosis: R achilles tendonitis                  Rehab Codes: M75.571  Onset date: 20                           Next Dr's appt.: prn  Visit# / total visits:      Cancels/No Shows: 0    Subjective:    Pain:  [] Yes  [] No Location: R achilles Pain Rating: (0-10 scale) 1-2/10  Pain altered Tx:  [] No  [] Yes  Action:  Comments:Pt reports still stiff at achilles after sitting a bit  Objective:  Modalities: prn  Manual:  DI Proximal and distal hip flexor release, Glute med, piriformis, sidekick R achilles and medial gastroc, Hypervolt B medial gastoc, R TFL and quad  Precautions: standard     Exercises:  Exercise Reps/ Time Weight/ Level Issued for [facility]   MOBO Y/N Comments   Prone               Flying squirrels               Hip ext (glut max)               [place] hip ext               Supine               2 legged bridges               1 legged bridges               PB hamstring curls           Hips to remain in neutral to ext   [employer]               Sidelying               Clams 90/30 deg               Angelica hip abd               Quadruped               Donkey kicks           Bar across pelvis   [place] twist               Gym               Lax ball foot roll x20   x        Hip flexor stretch w/ mild knee flexion 2'ea   x x       Burrito gastroc/soleus  stretch 2'ea   x x       Self posterior talar glide x20 black x      Toe yoga x30   x x   2 legged   Foot dome 10x10\"   x x       MOBO    x     Foot rocks x30   x     SL squat fwd/backward x10   x     Weight pass x10 Purple KB x x     Eccentric calf raises 2 sets  x x  TM step   squats x30   x  Chair at knees to cue   Monster walks               Hip thrusts               Step downs           Posterior and lateral   Posterior sling ex           On cable column   Ski jumper lunges               Functional reach               Reverse twisting lunge               RDLs           Retract scaps, one hand over, one hand under   Resisted C skip            Neutral L spine   Front squats           No L ext   Resisted Push Press               Ninja jumps           Soft landings on box; jump up/step down   Depth drops               Depth jumps           Jump down w/ low jose   Med ball cleans           Start on chest, elbows wide   TRX               hip add               Hamstring curl               Lower trap row                                               Other:     Specific Instructions for next treatment:  Glute max ( bridge march, hip thrust) run mechanics  NMR   Warm up:2 min walk   Pace: 11:31  Min/mile (5.2mph)   Duration: 10 minutes    Shoes: Saucony guide stability 8 mm, Adidas supernova glide 10mm drop   Beth:  172spm    Cues: Metronome to cue beth and cue to lift heel               Treatment Charges: Mins Units   []  Modalities     [x]  Ther Exercise 35 2   [x]  Manual Therapy 15 1   []  Ther Activities     [x]  NMR 10 1   []  Vasocompression     []  Freeze sleeve     Total Treatment time 60 4       Assessment: [x] Progressing toward goals. Improved hip extension. R medial gastroc fibrotic. Less stiff at achilles and less painful to touch. Pt is being forced to the lateral border of his foot/lesser toes in stance phase in the stability shoe.  He is more stable in R stance with neutral shoe demonstrating less contralateral hip drop. Recommended pt run every other day 3on/2off x6 cycles in the neutral shoe. If he is getting increased pain stop running. He still need to work on functional strength and proprioception. [] No change. [] Other:  [] Patient would continue to benefit from skilled physical therapy services in order to: improve ankle ROM, foot intrinsic activation and hip strength so pt can run painfree    STG: (to be met in 5 treatments)  1. ? 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 ? ROM:Normal hip extension and ankle DF knee straight to aide in LE mechanics  3. ? Strength: 5/5 hip strength to aide LE run mechanics  4. ? Function: Able to hold postural corrections made with manual techniques to aide LE mechanics  5. LEFI 70/80  6. Independent with Home Exercise Programs     LTG: (to be met in 10 treatments)  1. Pain 0/10 achilles pain  2. Pt able to demonstrate little to no foot/knee deviation and be able to perform SL squat in a glute dominant fashion  3. LEFI 80/80  4. Able to run as he wishes painfree with acceptable run form to reduce load on calf                    Patient goals:painfree running    Pt. Education:  [] Yes  [] No  [] Reviewed Prior HEP/Ed  Method of Education: [] Verbal  [] Demo  [] Written  Comprehension of Education:  [] Verbalizes understanding. [] Demonstrates understanding. [] Needs review. [] Demonstrates/verbalizes HEP/Ed previously given. Plan: [x] Continue current frequency toward long and short term goals.     [x] Specific Instructions for subsequent treatments: see above      Time In:1705          Time Out:1805    Electronically signed by:  Sadie Dueñas, PT

## 2020-07-09 ENCOUNTER — HOSPITAL ENCOUNTER (OUTPATIENT)
Dept: PHYSICAL THERAPY | Facility: CLINIC | Age: 60
Setting detail: THERAPIES SERIES
Discharge: HOME | End: 2020-07-09
Payer: COMMERCIAL

## 2020-07-09 PROCEDURE — 97110 THERAPEUTIC EXERCISES: CPT

## 2020-07-09 PROCEDURE — 97140 MANUAL THERAPY 1/> REGIONS: CPT

## 2020-07-09 PROCEDURE — 97112 NEUROMUSCULAR REEDUCATION: CPT

## 2020-07-09 NOTE — FLOWSHEET NOTE
[] [address]  [address]  [phone]  F: [phone] [] [address]  [facility] 36   [address]  P: [phone]  F: [phone] [x] [address] &  Therapy  [facility]  P: [phone]  F: [phone] [] [address]  [facility]   [address]   [place]: [phone]  F: [phone]      Physical Therapy Daily Treatment Note    Date:  2020  Patient Name:  Manjit Veras    :  1960  MRN: [id number]  Physician: Dr. Hill Cargo: MMO (Unlimited)  Medical Diagnosis: R achilles tendonitis                  Rehab Codes: M75.571  Onset date: 20                           Next 's appt.: prn  Visit# / total visits:      Cancels/No Shows: 0    Subjective:    Pain:  [] Yes  [] No Location: R achilles Pain Rating: (0-10 scale) 1-2/10  Pain altered Tx:  [] No  [] Yes  Action:  Comments:Pt reports he is feeling less stiff at achilles after sitting but its still there  Objective:  Modalities: prn  Manual:  Ant glide B hips, Glute med, piriformis, sidekick R achilles and medial gastroc, Hypervolt B medial gastoc, R TFL and quad  Precautions: standard     Exercises:  Exercise Reps/ Time Weight/ Level Issued for HEP   MOBO Y/N Comments   Prone               Flying squirrels               Hip ext (glut max)               [place] hip ext               Supine               2 legged bridges               1 legged bridges               PB hamstring curls           Hips to remain in neutral to ext   [employer]               Sidelying               Clams 90/30 deg               Angelica hip abd               Quadruped               Donkey kicks           Bar across pelvis   [place] twist               Gym               Lax ball foot roll x20   x        Hip flexor stretch w/ mild knee flexion 2'ea   x x       Burrito gastroc/soleus  stretch 2'ea   x x       Self posterior talar glide x20 black x      Toe yoga x30   x    2 legged   Foot dome 10x10\"   x        MOBO    x     Foot rocks x30   x     SL squat fwd/backward x10        Weight pass x10 Purple KB x x     Tippy bird 2x10   x     SL stand twist 2x10 Blk TT  x              Eccentric calf raises 2 sets  x x  TM step   1/2 plank KB pull through 2x10 purple x x     Monster walks               Hip thrusts               Step downs           Posterior and lateral   Posterior sling ex           On cable column   Ski jumper lunges               Functional reach               Reverse twisting lunge               RDLs           Retract scaps, one hand over, one hand under   Resisted C skip            Neutral L spine   Front squats           No L ext   Resisted Push Press               Ninja jumps           Soft landings on box; jump up/step down   Depth drops               Depth jumps           Jump down w/ low jose   Med ball cleans           Start on chest, elbows wide   TRX               hip add               Hamstring curl               Lower trap row                                               Other:     Specific Instructions for next treatment:  Glute max ( bridge march, hip thrust) run mechanics  NMR   Warm up:2 min walk   Pace: 11:31  Min/mile (5.2mph)   Duration: 10 minutes    Shoes: Adidas supernova glide 10mm drop   Beth:  174spm    Cues: Metronome to cue beth and cue to lift heel               Treatment Charges: Mins Units   []  Modalities     [x]  Ther Exercise 35 2   [x]  Manual Therapy 15 1   []  Ther Activities     [x]  NMR 10 1   []  Vasocompression     []  Freeze sleeve     Total Treatment time 60 4       Assessment: [x] Progressing toward goals. Pt has difficulty with keeping beth which leads to increased hip adduction and quick pronation moment. SL stability is improving. [] No change.      [] Other:  [] Patient would continue to benefit from skilled physical therapy services in order to: improve ankle ROM, foot intrinsic activation and hip strength so pt can run painfree    STG: (to be met in 5 treatments)  1. ? 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 ? ROM:Normal hip extension and ankle DF knee straight to aide in LE mechanics  3. ? Strength: 5/5 hip strength to aide LE run mechanics  4. ? Function: Able to hold postural corrections made with manual techniques to aide LE mechanics  5. LEFI 70/80  6. Independent with Home Exercise Programs     LTG: (to be met in 10 treatments)  1. Pain 0/10 achilles pain  2. Pt able to demonstrate little to no foot/knee deviation and be able to perform SL squat in a glute dominant fashion  3. LEFI 80/80  4. Able to run as he wishes painfree with acceptable run form to reduce load on calf                    Patient goals:painfree running    Pt. Education:  [] Yes  [] No  [] Reviewed Prior HEP/Ed  Method of Education: [] Verbal  [] Demo  [] Written  Comprehension of Education:  [] Verbalizes understanding. [] Demonstrates understanding. [] Needs review. [] Demonstrates/verbalizes HEP/Ed previously given. Plan: [x] Continue current frequency toward long and short term goals.     [x] Specific Instructions for subsequent treatments: see above      Time In:1705          Time Out:1805    Electronically signed by:  Stevie Jeffery, PT

## 2020-07-14 ENCOUNTER — HOSPITAL ENCOUNTER (OUTPATIENT)
Dept: PHYSICAL THERAPY | Facility: CLINIC | Age: 60
Setting detail: THERAPIES SERIES
Discharge: HOME | End: 2020-07-14
Payer: COMMERCIAL

## 2020-07-14 PROCEDURE — 97110 THERAPEUTIC EXERCISES: CPT

## 2020-07-14 PROCEDURE — 97140 MANUAL THERAPY 1/> REGIONS: CPT

## 2020-07-14 PROCEDURE — 97112 NEUROMUSCULAR REEDUCATION: CPT

## 2020-07-14 NOTE — FLOWSHEET NOTE
[] [facility]) - [facility] &  Therapy  [address]  [phone]  F: [phone] [] [address]  [place] [address]   [address]  P: [phone]  F: [phone] [x] [address] &  Therapy  [address] [facility]  P: [phone]  F: [phone] [] [address]  [facility]   [address]   [place]: [phone]  F: [phone]      Physical Therapy Daily Treatment Note    Date:  2020  Patient Name:  Jude Stratton    :  1960  MRN: [id number]  Physician: Dr. Fareed Patricio: MMO (Unlimited)  Medical Diagnosis: R achilles tendonitis                  Rehab Codes: M75.571  Onset date: 20                           Next 's appt.: prn  Visit# / total visits:      Cancels/No Shows: 0    Subjective:    Pain:  [] Yes  [] No Location: R achilles Pain Rating: (0-10 scale) 0/10  Pain altered Tx:  [] No  [] Yes  Action:  Comments:Ran3 on/2off.  Felt nothing in the achilles but tweaked hamstring I think from slanted road and and going to fast.   Objective:  Modalities: prn  Manual:  Ant glide B hips, Glute med, piriformis, sidekick R achilles and medial gastroc, Hypervolt B medial gastoc, R TFL and quad, R hamstring  Precautions: standard     Exercises:  Exercise Reps/ Time Weight/ Level Issued for HEP   MOBO Y/N Comments   Prone               Flying squirrels               Hip ext (glut max)               [place] hip ext               Supine               2 legged bridges               1 legged bridges               PB hamstring curls           Hips to remain in neutral to ext   [employer]               Sidelying               Clams 90/30 deg               Angelica hip abd  2 sets  lime    x    knees   Quadruped               Donkey kicks           Bar across pelvis   [place] twist               Gym

## 2020-07-16 ENCOUNTER — HOSPITAL ENCOUNTER (OUTPATIENT)
Dept: PHYSICAL THERAPY | Facility: CLINIC | Age: 60
Setting detail: THERAPIES SERIES
Discharge: HOME | End: 2020-07-16
Payer: COMMERCIAL

## 2020-07-16 PROCEDURE — 97140 MANUAL THERAPY 1/> REGIONS: CPT

## 2020-07-16 PROCEDURE — 97110 THERAPEUTIC EXERCISES: CPT

## 2020-07-16 PROCEDURE — 97112 NEUROMUSCULAR REEDUCATION: CPT

## 2020-07-16 NOTE — FLOWSHEET NOTE
Total Treatment time 60 4       Assessment: [x] Progressing toward goals. Pt is more relaxed at shoulders with running this date. Really focused on posture due to it being difficult for pt to maintain flor previously and demonstrating increased trunk extension when advancing R LE.    [] No change. [] Other:  [] Patient would continue to benefit from skilled physical therapy services in order to: improve ankle ROM, foot intrinsic activation and hip strength so pt can run painfree    STG: (to be met in 5 treatments)  1. ? 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 ? ROM:Normal hip extension and ankle DF knee straight to aide in LE mechanics  3. ? Strength: 5/5 hip strength to aide LE run mechanics  4. ? Function: Able to hold postural corrections made with manual techniques to aide LE mechanics  5. LEFI 70/80  6. Independent with Home Exercise Programs     LTG: (to be met in 10 treatments)  1. Pain 0/10 achilles pain  2. Pt able to demonstrate little to no foot/knee deviation and be able to perform SL squat in a glute dominant fashion  3. LEFI 80/80  4. Able to run as he wishes painfree with acceptable run form to reduce load on calf                    Patient goals:painfree running    Pt. Education:  [] Yes  [] No  [] Reviewed Prior HEP/Ed  Method of Education: [] Verbal  [] Demo  [] Written  Comprehension of Education:  [] Verbalizes understanding. [] Demonstrates understanding. [] Needs review. [] Demonstrates/verbalizes HEP/Ed previously given. Plan: [x] Continue current frequency toward long and short term goals.     [x] Specific Instructions for subsequent treatments: see above      Time In:1800          Time Out:1900    Electronically signed by:  Shade Kaba, PT

## 2020-07-20 ENCOUNTER — HOSPITAL ENCOUNTER (OUTPATIENT)
Dept: PHYSICAL THERAPY | Facility: CLINIC | Age: 60
Setting detail: THERAPIES SERIES
Discharge: HOME | End: 2020-07-20
Payer: COMMERCIAL

## 2020-07-20 PROCEDURE — 97140 MANUAL THERAPY 1/> REGIONS: CPT

## 2020-07-20 PROCEDURE — 97016 VASOPNEUMATIC DEVICE THERAPY: CPT

## 2020-07-20 NOTE — PROGRESS NOTES
[] [facility]) - [facility]  [facility]  [address]  [phone]  F: [phone] [] [address]  [place] [address]   [address]  P: [phone]  F: [phone] [x] [address] &  Therapy  [address] [facility]  P: [phone]  F: [phone] [] [address]  [facility]   [address]   [place]: [phone]  F: [phone]      Physical Therapy Daily Treatment/Progress Note     Date:  2020  Patient Name:  Valentino Serum    :  1960  MRN: [id number]  Physician: Dr. Brit Alejandro: MMO (Unlimited)  Medical Diagnosis: R achilles tendonitis     Rehab Codes: M75.571  Onset date: 20                            Next 's appt.: prn  Visit# / total visits:    Cancels/No Shows: 0    Subjective:    Pain:  [x] Yes  [] No Location: R achilles Pain Rating: (0-10 scale) 0/10 while running;   Pain altered Tx:  [x] No  [] Yes  Action:  Comments:pt has no pain while running 3' on/2' walk up to 9 cycles yesterday. + pain with standing after sitting for prolonged periods of time.      Objective:  Modalities:  sidekick R achilles and medial gastroc, Hypervolt R medial gastoc,   Precautions: standard     Exercises:  Exercise Reps/ Time Weight/ Level Issued for HEP   Comments   Sidelying             Clams 90/30 deg             Angelica hip abd  2 sets blue      ankles- metronome at 85 bpm   Quadruped             Donkey kicks         Bar across pelvis   [place] twist             Gym             Foam roller upper back x15       Arm circles x30 blue      Pull aparts x30 blue      Plank w/ KB pull through 2 sets purple      Lax ball foot roll x20   x      Hip flexor stretch w/ mild knee flexion 2'ea   x      Burrito gastroc/soleus  stretch 2'ea   x      Self posterior talar glide x20 black x     Toe yoga x30   x  2 legged   Foot dome 10x10\"   x      MOBO        Foot rocks x30    1/3, 2/4   SL squat fwd/backward x10       Weight pass x10 Purple KB x     Tippy bird 2x10       SL stand twist 2x10 Blk TT      HIp abd/ER band at knees 2x10 orange      Eccentric calf raises 2 sets  x  TM step   1/2 plank KB pull through 2x10 purple x     Heel tap to metronome  2 sets        85bpm   Squats   x30        chair at knees   Other:     Specific Instructions for next treatment:  Glute max ( bridge march, hip thrust) run mechanics. Modify eccentric calf raises from 1x25 to 3x15 and begin to add up to 20% added weight. He is to progress his running to 4'/1' x 6 cycles. NMR   Warm up:2 min walk   Pace: 10:54 Min/mile    Duration: 3on/2off x2 cycles 10 min total   Shoes: Adidas supernova glide 10mm drop   Flor:  174spm    Cues: Metronome to cue flor and cue for ribs down, lift heel        Treatment Charges: Mins Units   []  Modalities     []  Ther Exercise     [x]  Manual Therapy 25 2   []  Ther Activities     []  NMR     [x]  Vasocompression 15 1   []  Freeze sleeve     Total Treatment time 40 3       Assessment: [x] Progressing toward goals. He continues to present with significant tenderness and swelling on the midsubstance of the Achilles. Soft tissue tenderness has improved significantly. [] No change. [] Other:  [] Patient would continue to benefit from skilled physical therapy services in order to: improve ankle ROM, foot intrinsic activation and hip strength so pt can run painfree    STG: (to be met in 5 treatments)  1. ? 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 ? ROM:Normal hip extension and ankle DF knee straight to aide in LE mechanics  3. ? Strength: 5/5 hip strength to aide LE run mechanics  4. ? Function: Able to hold postural corrections made with manual techniques to aide LE mechanics  5. LEFI 70/80  6.  Independent with Home Exercise Programs     LTG: (to be met in 10 treatments)  1. Pain 0/10 achilles pain  2. Pt able to demonstrate little to no foot/knee deviation and be able to perform SL squat in a glute dominant fashion  3. LEFI 80/80  4. Able to run as he wishes painfree with acceptable run form to reduce load on calf                    Patient goals:painfree running    Pt. Education:  [x] Yes  [] No  [x] Reviewed Prior HEP/Ed  Method of Education: [x] Verbal  [] Demo  [] Written  Comprehension of Education:  [x] Verbalizes understanding. [] Demonstrates understanding. [] Needs review. [] Demonstrates/verbalizes HEP/Ed previously given. Plan: [x] Continue current frequency toward long and short term goals. [x] Specific Instructions for subsequent treatments:     Patient Status:     [] Continue per initial plan of care. [x] Additional visits necessary. [x] Other:     Requested Frequency/Duration:1 time per week for 10 treatments with the inclusion of IP with dexP        If you have any questions or concerns, please don't hesitate to call. Thank you for your referral.    Physician Signature:________________________________Date:__________________  By signing above or cosigning this note, I have reviewed this plan of care and certify a need for medically necessary rehabilitation services.        Time In:1803  Time Out:    Electronically signed by:  Beena Landrum PT

## 2020-07-27 ENCOUNTER — HOSPITAL ENCOUNTER (OUTPATIENT)
Dept: PHYSICAL THERAPY | Facility: CLINIC | Age: 60
Setting detail: THERAPIES SERIES
Discharge: HOME | End: 2020-07-27
Payer: COMMERCIAL

## 2020-07-27 PROCEDURE — 97016 VASOPNEUMATIC DEVICE THERAPY: CPT

## 2020-07-27 PROCEDURE — 97110 THERAPEUTIC EXERCISES: CPT

## 2020-07-27 NOTE — FLOWSHEET NOTE
[] [facility]) - [facility] &  Therapy  [address]  [phone]  F: [phone] [] [address]  [place] [address]   [address]  P: [phone]  F: [phone] [x] [address] &  Therapy  [address] [facility]  P: [phone]  F: [phone] [] [address]  [facility]   [address]   [place]: [phone]  F: [phone]      Physical Therapy Daily Treatment Note     Date:  2020  Patient Name:  Amalia Lea    :  1960  MRN: [id number]  Physician: Dr. Badillo Duty: MMO (Unlimited)  Medical Diagnosis: R achilles tendonitis     Rehab Codes: M75.571  Onset date: 20                            Next 's appt.: prn  Visit# / total visits:    Cancels/No Shows: 0    Subjective:    Pain:  [x] Yes  [] No Location: R achilles Pain Rating: (0-10 scale) 0/10 while running; 2/10 worst  Pain altered Tx:  [x] No  [] Yes  Action:  Comments:pt has no pain while running 4'/1'x 6 cycles 3x last week. + pain still with standing after sitting for prolonged periods of time.  \"more stiff than pain\" but enough to make him limp    Objective:  Modalities:  sidekick R achilles and medial gastroc, Hypervolt R medial gastoc--held on   Precautions: standard  Exercises:  Exercise Reps/ Time Weight/ Level Issued for HEP   Comments   Lateral elliptical 8' L1  x Alt every 2'   Sidelying             Angelica hip abd  2 sets blue      ankles- metronome at 85 bpm   Gym             Foam roller upper back x15       Arm circles x30 blue      Pull aparts x30 blue      Plank w/ KB pull through 2 sets purple      Lax ball foot roll x20   x --     Hip flexor stretch w/ mild knee flexion 2'ea   x --     Burrito gastroc/soleus  stretch 2'ea   x --     Self posterior talar glide x20 black x     Total gym calf raises 3x15 L17  x R legged: concentric and eccentric   Toe yoga x30   x -- 2 legged   Foot dome 10x10\"   x --     MOBO        Foot rocks x30    1/3, 2/4   SL squat fwd/backward x10       Weight pass x10 Purple KB x     SL stand twist 2x10 Blk TT      HIp abd/ER band at knees 2x10 orange      Eccentric calf raises 3x10-15 15/20 lbs x x TM step, 2 up-1 down 6 second descent   1/2 plank KB pull through 2x10 purple x     Heel tap to metronome  3x15     x    Chair squats  2x15      x With cane and 3 points of contact   Other:     Specific Instructions for next treatment:  Glute max ( bridge march, hip thrust) run mechanics. Modify eccentric calf raises from 1x25 to 3x15 and begin to add up to 20% added weight. He is to progress his running to 30 min consecutive        Treatment Charges: Mins Units   []  Modalities     [x]  Ther Exercise 45 3   []  Manual Therapy     []  Ther Activities     []  NMR     [x]  Vasocompression 15 1   []  Freeze sleeve     Total Treatment time 60 4       Assessment: [x] Progressing toward goals. No MD signature yet. Only able to perform body wieght + 11% of body weight for eccentric calf raises. Goal is 3x15 at 20%. [] No change. [] Other:  [] Patient would continue to benefit from skilled physical therapy services in order to: improve ankle ROM, foot intrinsic activation and hip strength so pt can run painfree    STG: (to be met in 5 treatments)  1. ? 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 ? ROM:Normal hip extension and ankle DF knee straight to aide in LE mechanics  3. ? Strength: 5/5 hip strength to aide LE run mechanics  4. ? Function: Able to hold postural corrections made with manual techniques to aide LE mechanics  5. LEFI 70/80  6. Independent with Home Exercise Programs     LTG: (to be met in 10 treatments)  1. Pain 0/10 achilles pain  2.  Pt able to demonstrate little to no foot/knee deviation and be able to perform SL squat in a glute dominant fashion  3. LEFI 80/80  4. Able to run as he wishes painfree with acceptable run form to reduce load on calf                    Patient goals:painfree running    Pt. Education:  [x] Yes  [] No  [x] Reviewed Prior HEP/Ed  Method of Education: [x] Verbal  [] Demo  [] Written  Comprehension of Education:  [x] Verbalizes understanding. [] Demonstrates understanding. [] Needs review. [] Demonstrates/verbalizes HEP/Ed previously given. Plan: [x] Continue current frequency toward long and short term goals.     [x] Specific Instructions for subsequent treatments: awaiting for MD to sign off on IP order    Time In:1700  Time DWN:7409    Electronically signed by:  Aga Mahan PT

## 2020-07-29 ENCOUNTER — TELEMEDICINE (OUTPATIENT)
Dept: FAMILY MEDICINE CLINIC | Age: 60
End: 2020-07-29
Payer: COMMERCIAL

## 2020-07-29 ENCOUNTER — EMPLOYEE WELLNESS (OUTPATIENT)
Dept: OTHER | Age: 60
End: 2020-07-29

## 2020-07-29 LAB
CHOLESTEROL/HDL RATIO: 4.3
CHOLESTEROL: 199 MG/DL
GLUCOSE BLD-MCNC: 89 MG/DL (ref 70–99)
HDLC SERPL-MCNC: 46 MG/DL
LDL CHOLESTEROL: 133 MG/DL (ref 0–130)
PATIENT FASTING?: YES
TRIGL SERPL-MCNC: 98 MG/DL
VLDLC SERPL CALC-MCNC: ABNORMAL MG/DL (ref 1–30)

## 2020-07-29 PROCEDURE — 99213 OFFICE O/P EST LOW 20 MIN: CPT | Performed by: FAMILY MEDICINE

## 2020-07-29 ASSESSMENT — PATIENT HEALTH QUESTIONNAIRE - PHQ9
SUM OF ALL RESPONSES TO PHQ9 QUESTIONS 1 & 2: 0
1. LITTLE INTEREST OR PLEASURE IN DOING THINGS: 0
SUM OF ALL RESPONSES TO PHQ QUESTIONS 1-9: 0
2. FEELING DOWN, DEPRESSED OR HOPELESS: 0
SUM OF ALL RESPONSES TO PHQ QUESTIONS 1-9: 0

## 2020-07-29 NOTE — PROGRESS NOTES
General FM note    Bhakti Rodas is a 64 y.o. male who presents today for follow up on his  medical conditions as noted below. Bhakti Rodas is c/o of No chief complaint on file.       Patient Active Problem List:     Right hip pain     Arthritis of right hip     Chronic midline low back pain without sciatica     Tendonitis of knee, right     Acute pain of right knee     Past Medical History:   Diagnosis Date    Arthritis     in hip    Asthma     GERD (gastroesophageal reflux disease)       Past Surgical History:   Procedure Laterality Date    COLONOSCOPY  04/06/2018    polyp removed    COLONOSCOPY N/A 4/6/2018    COLONOSCOPY POLYPECTOMY SNARE/COLD BIOPSY performed by Roula Prado MD at [address] [place] [address] VASECTOMY       Family History   Problem Relation Age of Onset    No Known Problems Mother     Diabetes Father      Current Outpatient Medications   Medication Sig Dispense Refill    beclomethasone (QVAR REDIHALER) 40 MCG/ACT AERB inhaler Inhale 1 puff into the lungs 2 times daily 1 Inhaler 3    omeprazole (PRILOSEC) 20 MG delayed release capsule TAKE 1 CAPSULE BY MOUTH 2 TIMES A  capsule 1    beclomethasone (QVAR) 40 MCG/ACT inhaler Inhale 2 puffs into the lungs 2 times daily 1 Inhaler 0    SUMAtriptan (IMITREX) 100 MG tablet TAKE 1 TABLET BY MOUTH ONCE AS NEEDED FOR MIGRAINE 9 tablet 0    albuterol sulfate HFA (VENTOLIN HFA) 108 (90 Base) MCG/ACT inhaler Inhale 2 puffs into the lungs every 6 hours as needed for Wheezing or Shortness of Breath (cough) 1 Inhaler 11    cromolyn (NASALCROM) 5.2 MG/ACT nasal spray 1 spray by Nasal route 3 times daily 1 Bottle 3    mometasone (ASMANEX, 60 METERED DOSES,) 220 MCG/INH inhaler Inhale 1 puff into the lungs 2 times daily 1 Inhaler 11    montelukast (SINGULAIR) 10 MG tablet Take 1 tablet by mouth nightly 30 tablet 11    albuterol sulfate HFA (PROAIR HFA) 108 (90 Base) MCG/ACT inhaler Inhale 2 puffs into the lungs every 6 hours as needed for Wheezing 1 Inhaler 3     No current facility-administered medications for this visit. ALLERGIES:    Allergies   Allergen Reactions    Ciprofloxacin Dermatitis       Social History     Tobacco Use    Smoking status: Never Smoker    Smokeless tobacco: Never Used   Substance Use Topics    Alcohol use: Yes     Comment: socially      There is no height or weight on file to calculate BMI. There were no vitals taken for this visit. Subjective:      HPI    63 yo male reaching out per tele med visit today. Back issues for some time now. 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 Got XR's - did show mild changes at L4/5. Was told to get an MRI. Pain located at the lower back more to the R - no radiation, NSAIDs help. But he does not want to take NSAIDs all the time. Pain always there. Has been seeing a PT for achilles tendinitis. Improving. Has Fu with pulmo. Review of Systems   Constitutional: Negative for fever and unexpected weight change. Pertinent items are noted in HPI. Objective:   Physical Exam  General appearance: normal development, habitus and attention, no deformities. No distress. Pulmo: normal breathing pattern. Psychiatric: alert and oriented to place, time and person. Normal mood and affect. Assessment:       Diagnosis Orders   1. Chronic right-sided low back pain without sciatica  [facility] - [place]    MRI LUMBAR SPINE WO CONTRAST       Plan:   Call or return to clinic prn if these symptoms worsen or fail to improve as anticipated. I have reviewed the instructions with the patient, answering all questions to his satisfaction. Drew Casanova is a 64 y.o. male being evaluated by a Virtual Visit (video visit) encounter to address concerns as mentioned above. A caregiver was present when appropriate.  Due to this being a TeleHealth encounter (During [id number]-[id number] public health emergency), evaluation of the following

## 2020-08-04 ENCOUNTER — HOSPITAL ENCOUNTER (OUTPATIENT)
Dept: PHYSICAL THERAPY | Facility: CLINIC | Age: 60
Setting detail: THERAPIES SERIES
Discharge: HOME | End: 2020-08-04
Payer: COMMERCIAL

## 2020-08-04 PROCEDURE — 97033 APP MDLTY 1+IONTPHRSIS EA 15: CPT

## 2020-08-04 PROCEDURE — 97140 MANUAL THERAPY 1/> REGIONS: CPT

## 2020-08-04 NOTE — FLOWSHEET NOTE
Cervical x _____lbs               [] Continuous     [] Intermittent  -   On:_____x Off:_____   1x2 Other:  Ex, man       Precautions: standard  Exercises:  Exercise Reps/ Time Weight/ Level Issued for HEP   Comments   Lateral elliptical 8' L1  x Alt every 2'   Sidelying             Angelica hip abd  2 sets blue      ankles- metronome at 85 bpm   Gym             Foam roller upper back x15       Arm circles x30 blue      Pull aparts x30 blue      Plank w/ KB pull through 2 sets purple      Lax ball foot roll x20   x --     Hip flexor stretch w/ mild knee flexion 2'ea   x --     Burrito gastroc/soleus  stretch 2'ea   x --     Self posterior talar glide x20 black x     Total gym calf raises 3x15 L17  x R legged: concentric and eccentric   Toe yoga x30   x -- 2 legged   Foot dome 10x10\"   x --     MOBO        Foot rocks x30    1/3, 2/4   SL squat fwd/backward x10       Weight pass x10 Purple KB x     SL stand twist 2x10 Blk TT      HIp abd/ER band at knees 2x10 orange      Eccentric calf raises 3x10-15 15/20 lbs x x TM step, 2 up-1 down 6 second descent   1/2 plank KB pull through 2x10 purple x     Heel tap to metronome  3x15     x    Chair squats  2x15      x With cane and 3 points of contact   Other:     Specific Instructions for next treatment:  Glute max ( bridge march, hip thrust) run mechanics. Modify eccentric calf raises from 1x25 to 3x15 and begin to add up to 20% added weight. He is to progress his running to 30 min consecutive        Treatment Charges: Mins Units   [x]  Modalities  IP 15 1   []  Ther Exercise     [x]  Manual Therapy 25 2   []  Ther Activities     []  NMR     []  Vasocompression     []  Freeze sleeve     Total Treatment time 40 3       Assessment: [x] Progressing toward goals. IP order signed and performed. R glut med was exquisitely tender, and R hip flexor was also tender, but less so. Full hip mobility. [] No change.      [] Other:  [] Patient would continue to benefit from skilled

## 2020-08-06 ENCOUNTER — HOSPITAL ENCOUNTER (OUTPATIENT)
Dept: PHYSICAL THERAPY | Facility: CLINIC | Age: 60
Setting detail: THERAPIES SERIES
Discharge: HOME | End: 2020-08-06
Payer: COMMERCIAL

## 2020-08-06 PROCEDURE — 97110 THERAPEUTIC EXERCISES: CPT

## 2020-08-06 PROCEDURE — 97140 MANUAL THERAPY 1/> REGIONS: CPT

## 2020-08-06 PROCEDURE — 97033 APP MDLTY 1+IONTPHRSIS EA 15: CPT

## 2020-08-06 NOTE — FLOWSHEET NOTE
[] [facility]) - [facility] &  Therapy  [address]  [phone]  F: [phone] [] [address]  [place] [address]   [address]  P: [phone]  F: [phone] [x] [address]  Therapy  [address]  P: [phone]  F: [phone] [] [address]  [facility]   [address]   [place]: [phone]  F: [phone]      Physical Therapy Daily Treatment Note     Date:  2020  Patient Name:  Valentino Serum    :  1960  MRN: [id number]  Physician: Dr. Brit Alejandro: MMO ([employer])  Medical Diagnosis: R achilles tendonitis     Rehab Codes: M75.571  Onset date: 20                            Next Dr's appt.: prn  Visit# / total visits:    Cancels/No Shows: 0    Subjective:    Pain:  [x] Yes  [] No Location: R achilles Pain Rating: (0-10 scale) 0/10 while running; 7/10 worst  Pain altered Tx:  [x] No  [] Yes  Action:  Comments: Pt reports no current pain. Pt states his last two runs have been for 30 consecutive minutes with no pain while running. Pt states point tenderness still present which results in highest pain. Pt reports prolonged sitting increased tightness/stiffness in R achilles. Pt reports no adverse reactions to ionto from last visit.      Objective:  Modalities:      Treatment Location  Left      Right                          Position   Achilles []          [x]  [] Supine    [x] Prone   [] Side lying  [] Sitting          Treatment Modality   3 Iontophoresis:  IOGEL:  [] 1.5ml   [x] 2.5ml   [] 3.5ml                                            [] 1.0ml   [] 1.3ml                 Dosage:  [] 24 mA/min   [x] 40 mA/min  []  80 mA/min                Channels:   [x] 1    [] 2                [x] 4mg/ml Dexamethasone Sodium Phosphate Dosage 24-80 mAmin                [] Acetic Acid [] Other     [] Drug allergies reviewed    ______JSInitials           8/4/20Date    Traction:   [] Prone   [] Supine   X _______min               [] Lumbar x ____lbs      [] Cervical x _____lbs               [] Continuous     [] Intermittent  -   On:_____x Off:_____   1x2 Other:  Ex, man       Precautions: standard  Exercises:  Exercise Reps/ Time Weight/ Level Issued for HEP   Comments   Lateral elliptical 8' L3  x Alt every 2'   Sidelying             Angelica hip abd  2 sets blue      ankles- metronome at 85 bpm   Gym             Foam roller upper back x15       Arm circles x30 blue      Pull aparts x30 blue      Plank w/ KB pull through 2 sets purple      Lax ball foot roll x20   x --     Hip flexor stretch w/ mild knee flexion 2'ea   x --     Burrito gastroc/soleus  stretch 2'ea   x --     Self posterior talar glide x20 black x     Total gym calf raises 3x15 L17  x R legged: concentric and eccentric   Toe yoga x30   x x 2 legged   Foot dome 10x10\"   x --     MOBO        Foot rocks x30    1/3, 2/4   SL squat fwd/backward x10       Weight pass x10 Purple KB x     SL stand twist 2x10 Blk TT      HIp abd/ER band at knees 2x10 orange      Eccentric calf raises 3x15 15/20 lbs x x TM step, 2 up-1 down 6 second descent   1/2 plank KB pull through 2x10 purple x     Heel tap to metronome  3x15         Chair squats  2x15      x With cane and 3 points of contact   Glute max bridge+march 3x10   x    Other:  Manual:   DI to R piriformis and R glute med  IASTM to R Achilles tendon/R soleus distal aspect      Specific Instructions for next treatment:  Modify eccentric calf raises from 1x25 to 3x15 and begin to add up to 20% added weight.  He is to progress his running to 30 min consecutive        Treatment Charges: Mins Units   [x]  Modalities  IP 15 1   [x]  Ther Exercise 30 2   [x]  Manual Therapy 10 1   []  Ther Activities     []  NMR     []  Vasocompression     []  Freeze sleeve     Total Treatment time 55 4 Assessment: [x] Progressing toward goals. [] No change. [x] Other: Progressed pt with addition of glut bridge+march with good pt tolerance. Min v/c for increased core activation and increased hip extension during bridges with march to promote glute activation with good carryover to pt. Pt presented with increased muscular tension in R glute med and piriformis with good releases with DI. Therapist applied IASTM to R Achilles to help decreased tightness with fair result. Ended with ionto with no adverse reaction noted post. Pt noted mild decrease in stiffness/tightness upon completion of therapy. [x] Patient would continue to benefit from skilled physical therapy services in order to: improve ankle ROM, foot intrinsic activation and hip strength so pt can run painfree    STG: (to be met in 5 treatments)  1. ? 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 ? ROM:Normal hip extension and ankle DF knee straight to aide in LE mechanics  3. ? Strength: 5/5 hip strength to aide LE run mechanics  4. ? Function: Able to hold postural corrections made with manual techniques to aide LE mechanics  5. LEFI 70/80  6. Independent with Home Exercise Programs     LTG: (to be met in 10 treatments)  1. Pain 0/10 achilles pain  2. Pt able to demonstrate little to no foot/knee deviation and be able to perform SL squat in a glute dominant fashion  3. LEFI 80/80  4. Able to run as he wishes painfree with acceptable run form to reduce load on calf                    Patient goals:painfree running    Pt. Education:  [x] Yes  [] No  [] Reviewed Prior HEP/Ed  Method of Education: [x] Verbal  [x] Demo  [] Written  Comprehension of Education:  [x] Verbalizes understanding. [x] Demonstrates understanding. [] Needs review. [] Demonstrates/verbalizes HEP/Ed previously given. Plan: [x] Continue current frequency toward long and short term goals.     [x] Specific Instructions for subsequent treatments: Time In: 6:00pm  Time Out: 7:12pm    Electronically signed by:  Lara Hoover PTA

## 2020-08-10 ENCOUNTER — HOSPITAL ENCOUNTER (OUTPATIENT)
Dept: PHYSICAL THERAPY | Facility: CLINIC | Age: 60
Setting detail: THERAPIES SERIES
Discharge: HOME | End: 2020-08-10
Payer: COMMERCIAL

## 2020-08-10 PROCEDURE — 97033 APP MDLTY 1+IONTPHRSIS EA 15: CPT

## 2020-08-10 PROCEDURE — 97140 MANUAL THERAPY 1/> REGIONS: CPT

## 2020-08-10 NOTE — FLOWSHEET NOTE
[] [facility]) - [facility] &  Therapy  [address]  [phone]  F: [phone] [] [address]  [place] [address]   [address]  P: [phone]  F: [phone] [x] [address] &  Therapy  [address]  P: [phone]  F: [phone] [] [address]  [facility]   [address]   [place]: [phone]  F: [phone]      Physical Therapy Daily Treatment Note     Date:  8/10/2020  Patient Name:  Arianna Gutierrez    :  1960  MRN: [id number]  Physician: Dr. Randhawa Arch: [employer] ([employer])  Medical Diagnosis: R achilles tendonitis     Rehab Codes: M75.571  Onset date: 20                            Next 's appt.: prn  Visit# / total visits:    Cancels/No Shows: 0    Subjective:    Pain:  [x] Yes  [] No Location: R achilles Pain Rating: (0-10 scale) 0/10 while running; 7/10 worst  Pain altered Tx:  [x] No  [] Yes  Action:  Comments: Pt reports rode 30 miles/ran 5 miles and later had increased pain. 0/10 had no pain. It was the most painless run in 4 months. Beth was [address] and 176 max.     Objective:  Modalities:      Treatment Location  Left      Right                          Position   Achilles []          [x]  [] Supine    [x] Prone   [] Side lying  [] Sitting          Treatment Modality   3 Iontophoresis:  IOGEL:  [] 1.5ml   [x] 2.5ml   [] 3.5ml                                            [] 1.0ml   [] 1.3ml                 Dosage:  [] 24 mA/min   [x] 40 mA/min  []  80 mA/min                Channels:   [x] 1    [] 2                [x] 4mg/ml Dexamethasone Sodium Phosphate Dosage 24-80 mAmin                [] Acetic Acid       [] Other     [] Drug allergies reviewed    ______JSInitials           20Date    Traction:   [] Prone   [] Supine   X _______min               [] Lumbar x ____lbs      [] Cervical x _____lbs               [] Continuous     [] Intermittent  -   On:_____x Off:_____   1x2 Other:  Ex, man       Precautions: standard  Exercises:  Exercise Reps/ Time Weight/ Level Issued for HEP   Comments   Lateral elliptical 8' L3   Alt every 2'   Sidelying             Angelica hip abd  2 sets blue      ankles- metronome at 85 bpm   Gym             Foam roller upper back x15       Arm circles x30 blue      Pull aparts x30 blue      Plank w/ KB pull through 2 sets purple      Lax ball foot roll x20   x --     Hip flexor stretch w/ mild knee flexion 2'ea   x --     Burrito gastroc/soleus  stretch 2'ea   x --     Self posterior talar glide x20 black x     Total gym calf raises 3x15 L17   R legged: concentric and eccentric   Toe yoga x30   x  2 legged   Foot dome 10x10\"   x --     MOBO        Foot rocks x30    1/3, 2/4   SL squat fwd/backward x10       Weight pass x10 Purple KB x     SL stand twist 2x10 Blk TT      HIp abd/ER band at knees 2x10 orange      Eccentric calf raises 3x15 15/20 lbs x  TM step, 2 up-1 down 6 second descent   1/2 plank KB pull through 2x10 purple x     Heel tap to metronome  3x15         Chair squats  2x15       With cane and 3 points of contact   Glute max bridge+march 3x10       Other:  Manual:   DI to R piriformis, glute med, and hip flexor (proximal and distal)  Joint mobs (PA mobs 3x10 grade 3) and MWM  IASTM to R calf via Sidekick and Hypervolt, followed by manual STM. Specific Instructions for next treatment:  Modify eccentric calf raises from 1x25 to 3x15 and begin to add up to 20% added weight. He is to progress his running to 30 min consecutive        Treatment Charges: Mins Units   [x]  Modalities  IP 15 1   []  Ther Exercise     [x]  Manual Therapy 40 3   []  Ther Activities     []  NMR     []  Vasocompression     []  Freeze sleeve     Total Treatment time 55 4       Assessment: [x] Progressing toward goals. Mild-mod tenderness on hip.  Hip mobility improved to WNL after mobs and STM    [] No change. [x] Other:   [x] Patient would continue to benefit from skilled physical therapy services in order to: improve ankle ROM, foot intrinsic activation and hip strength so pt can run painfree    STG: (to be met in 5 treatments)  1. ? 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 ? ROM:Normal hip extension and ankle DF knee straight to aide in LE mechanics  3. ? Strength: 5/5 hip strength to aide LE run mechanics  4. ? Function: Able to hold postural corrections made with manual techniques to aide LE mechanics  5. LEFI 70/80  6. Independent with Home Exercise Programs     LTG: (to be met in 10 treatments)  1. Pain 0/10 achilles pain  2. Pt able to demonstrate little to no foot/knee deviation and be able to perform SL squat in a glute dominant fashion  3. LEFI 80/80  4. Able to run as he wishes painfree with acceptable run form to reduce load on calf                    Patient goals:painfree running    Pt. Education:  [x] Yes  [] No  [] Reviewed Prior HEP/Ed  Method of Education: [x] Verbal  [x] Demo  [] Written  Comprehension of Education:  [x] Verbalizes understanding. [x] Demonstrates understanding. [] Needs review. [] Demonstrates/verbalizes HEP/Ed previously given. Plan: [x] Continue current frequency toward long and short term goals.     [x] Specific Instructions for subsequent treatments:     Time In: 7400  Time Out: 5176 [facility]    Electronically signed by:  Reza Salcedo PT

## 2020-08-12 ENCOUNTER — HOSPITAL ENCOUNTER (OUTPATIENT)
Dept: PHYSICAL THERAPY | Facility: CLINIC | Age: 60
Setting detail: THERAPIES SERIES
Discharge: HOME | End: 2020-08-12
Payer: COMMERCIAL

## 2020-08-12 PROCEDURE — 97033 APP MDLTY 1+IONTPHRSIS EA 15: CPT

## 2020-08-12 PROCEDURE — 97140 MANUAL THERAPY 1/> REGIONS: CPT

## 2020-08-12 NOTE — FLOWSHEET NOTE
[] [facility] [facility]  [address]  [phone]  F: [phone] [] [address]  [facility]   [address]  P: [phone]  F: [phone] [x] [address]  Therapy  [address]  P: [phone]  F: [phone] [] [address]  [facility]   [address]   [place]: [phone]  F: [phone]      Physical Therapy Daily Treatment Note     Date:  2020  Patient Name:  Valentino Serum    :  1960  MRN: [id number]  Physician: Dr. Brit Alejandro: MMO ([employer])  Medical Diagnosis: R achilles tendonitis, LBP    Rehab Codes: M75.571, M54.5  Onset date: 20                            Next Dr's appt.: prn  Visit# / total visits:    Cancels/No Shows: 0    Subjective:    Pain:  [x] Yes  [] No Location: R achilles Pain Rating: (0-10 scale) 0/10 while running; 7/10 worst  Pain altered Tx:  [x] No  [] Yes  Action:  Comments: Pt reports he continues to have no 0/10 had no pain when running, but is  and swollen on AT    Objective:  Modalities:      Treatment Location  Left      Right                          Position   Achilles []          [x]  [] Supine    [x] Prone   [] Side lying  [] Sitting          Treatment Modality   3 Iontophoresis:  IOGEL:  [] 1.5ml   [x] 2.5ml   [] 3.5ml                                            [] 1.0ml   [] 1.3ml                 Dosage:  [] 24 mA/min   [x] 40 mA/min  []  80 mA/min                Channels:   [x] 1    [] 2                [x] 4mg/ml Dexamethasone Sodium Phosphate Dosage 24-80 mAmin                [] Acetic Acid       [] Other     [] Drug allergies reviewed    ______JSInitials           20Date    Traction:   [] Prone   [] Supine   X _______min               [] Lumbar x ____lbs      [] Cervical x _____lbs [] Continuous     [] Intermittent  -   On:_____x Off:_____   1x2 Other:  Ex, man       Precautions: standard  Exercises:  Exercise Reps/ Time Weight/ Level Issued for HEP   Comments   Lateral elliptical 8' L3   Alt every 2'   Sidelying             Angelica hip abd  2 sets blue      ankles- metronome at 85 bpm   Gym             Foam roller upper back x15       Arm circles x30 blue      Pull aparts x30 blue      Plank w/ KB pull through 2 sets purple      Lax ball foot roll x20   x --     Hip flexor stretch w/ mild knee flexion 2'ea   x --     Burrito gastroc/soleus  stretch 2'ea   x --     Self posterior talar glide x20 black x     Total gym calf raises 3x15 L17   R legged: concentric and eccentric   Toe yoga x30   x  2 legged   Foot dome 10x10\"   x --     MOBO        Foot rocks x30    1/3, 2/4   SL squat fwd/backward x10       Weight pass x10 Purple KB x     SL stand twist 2x10 Blk TT      HIp abd/ER band at knees 2x10 orange      Eccentric calf raises 3x15 15/20 lbs x  TM step, 2 up-1 down 6 second descent   1/2 plank KB pull through 2x10 purple x     Heel tap to metronome  3x15         Chair squats  2x15       With cane and 3 points of contact   Glute max bridge+march 3x10       Other:  Manual:   DI to  hip flexor (proximal and distal)  IASTM to R calf via Sidekick and Hypervolt, followed by manual STM. Specific Instructions for next treatment:        Treatment Charges: Mins Units   [x]  Modalities  IP 15 1   []  Ther Exercise     [x]  Manual Therapy 30 2   []  Ther Activities     []  NMR     []  Vasocompression     []  Freeze sleeve     Total Treatment time 45 3       Assessment: [x] Progressing toward goals. Mild tenderness on hip. Hip mobility was WNL before mobs and STM. Remains tender on AT.     [] No change.      [x] Other:   [x] Patient would continue to benefit from skilled physical therapy services in order to: improve ankle ROM, foot intrinsic activation and hip strength so pt can run painfree    STG: (to be met in 5 treatments)  1. ? 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 ? ROM:Normal hip extension and ankle DF knee straight to aide in LE mechanics  3. ? Strength: 5/5 hip strength to aide LE run mechanics  4. ? Function: Able to hold postural corrections made with manual techniques to aide LE mechanics  5. LEFI 70/80  6. Independent with Home Exercise Programs     LTG: (to be met in 10 treatments)  1. Pain 0/10 achilles pain  2. Pt able to demonstrate little to no foot/knee deviation and be able to perform SL squat in a glute dominant fashion  3. LEFI 80/80  4. Able to run as he wishes painfree with acceptable run form to reduce load on calf                    Patient goals:painfree running    Pt. Education:  [x] Yes  [] No  [] Reviewed Prior HEP/Ed  Method of Education: [x] Verbal  [x] Demo  [] Written  Comprehension of Education:  [x] Verbalizes understanding. [x] Demonstrates understanding. [] Needs review. [] Demonstrates/verbalizes HEP/Ed previously given. Plan: [x] Continue current frequency toward long and short term goals.     [x] Specific Instructions for subsequent treatments:     Time In: 1700  Time Out: 0549    Electronically signed by:  Reyna Rubio PT

## 2020-08-17 ENCOUNTER — HOSPITAL ENCOUNTER (OUTPATIENT)
Dept: PHYSICAL THERAPY | Facility: CLINIC | Age: 60
Setting detail: THERAPIES SERIES
Discharge: HOME | End: 2020-08-17
Payer: COMMERCIAL

## 2020-08-17 PROCEDURE — 97033 APP MDLTY 1+IONTPHRSIS EA 15: CPT

## 2020-08-17 PROCEDURE — 97140 MANUAL THERAPY 1/> REGIONS: CPT

## 2020-08-17 NOTE — FLOWSHEET NOTE
[] [address]  [address]  [phone]  F: [phone] [] [address]  [facility]   [address]  P: [phone]  F: [phone] [x] [address]  Therapy  [address]  P: [phone]  F: [phone] [] [address]  [facility]   [address]   [place]: [phone]  F: [phone]      Physical Therapy Daily Treatment Note     Date:  2020  Patient Name:  Tamara Agudelo    :  1960  MRN: [id number]  Physician: Dr. Horace Esparza: MMO (Unlimited)  Medical Diagnosis: R achilles tendonitis, LBP    Rehab Codes: M75.571, M54.5  Onset date: 20                            Next 's appt.: prn  Visit# / total visits:    Cancels/No Shows: 0    Subjective:    Pain:  [x] Yes  [] No Location: R achilles Pain Rating: (0-10 scale) 0/10 while running; 7/10 worst  Pain altered Tx:  [x] No  [] Yes  Action:  Comments: Pt reports he continues to have no 0/10 had no pain when running, but is  and swollen on AT    Objective:  Modalities:      Treatment Location  Left      Right                          Position   Achilles []          [x]  [] Supine    [x] Prone   [] Side lying  [] Sitting          Treatment Modality   3 Iontophoresis:  IOGEL:  [] 1.5ml   [x] 2.5ml   [] 3.5ml                                            [] 1.0ml   [] 1.3ml                 Dosage:  [] 24 mA/min   [x] 40 mA/min  []  80 mA/min                Channels:   [x] 1    [] 2                [x] 4mg/ml Dexamethasone Sodium Phosphate Dosage 24-80 mAmin                [] Acetic Acid       [] Other     [] Drug allergies reviewed    ______JSInitials           20Date    Traction:   [] Prone   [] Supine   X _______min               [] Lumbar x ____lbs      [] Cervical x _____lbs [] Continuous     [] Intermittent  -   On:_____x Off:_____   1x2 Other:  Ex, man       Precautions: standard  Exercises:  Exercise Reps/ Time Weight/ Level Issued for HEP   Comments   Lateral elliptical 8' L3   Alt every 2'   Sidelying             Angelica hip abd  2 sets blue      ankles- metronome at 85 bpm   Gym             Foam roller upper back x15       Arm circles x30 blue      Pull aparts x30 blue      Plank w/ KB pull through 2 sets purple      Lax ball foot roll x20   x --     Hip flexor stretch w/ mild knee flexion 2'ea   x --     Burrito gastroc/soleus  stretch 2'ea   x --     Self posterior talar glide x20 black x     Total gym calf raises 3x15 L17   R legged: concentric and eccentric   Toe yoga x30   x  2 legged   Foot dome 10x10\"   x --     MOBO        Foot rocks x30    1/3, 2/4   SL squat fwd/backward x10       Weight pass x10 Purple KB x     SL stand twist 2x10 Blk TT      HIp abd/ER band at knees 2x10 orange      Eccentric calf raises 3x15 15/20 lbs x  TM step, 2 up-1 down 6 second descent   1/2 plank KB pull through 2x10 purple x     Heel tap to metronome  3x15         Chair squats  2x15       With cane and 3 points of contact   Glute max bridge+march 3x10       Other:  Manual:   DI to  hip flexor (proximal and distal)  IASTM to R calf via Sidekick and Hypervolt, followed by manual STM. Specific Instructions for next treatment:        Treatment Charges: Mins Units   [x]  Modalities  IP 15 1   []  Ther Exercise     [x]  Manual Therapy 30 2   []  Ther Activities     []  NMR     []  Vasocompression     []  Freeze sleeve     Total Treatment time 45 3       Assessment: [x] Progressing toward goals. Mild tenderness on hip. Hip mobility was WNL before mobs and STM. Remains tender on AT.     [] No change.      [x] Other:   [x] Patient would continue to benefit from skilled physical therapy services in order to: improve ankle ROM, foot intrinsic activation and hip strength so pt can run painfree    STG: (to be met in 5 treatments)  1. ? 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 ? ROM:Normal hip extension and ankle DF knee straight to aide in LE mechanics  3. ? Strength: 5/5 hip strength to aide LE run mechanics  4. ? Function: Able to hold postural corrections made with manual techniques to aide LE mechanics  5. LEFI 70/80  6. Independent with Home Exercise Programs     LTG: (to be met in 10 treatments)  1. Pain 0/10 achilles pain  2. Pt able to demonstrate little to no foot/knee deviation and be able to perform SL squat in a glute dominant fashion  3. LEFI 80/80  4. Able to run as he wishes painfree with acceptable run form to reduce load on calf                    Patient goals:painfree running    Pt. Education:  [x] Yes  [] No  [] Reviewed Prior HEP/Ed  Method of Education: [x] Verbal  [x] Demo  [] Written  Comprehension of Education:  [x] Verbalizes understanding. [x] Demonstrates understanding. [] Needs review. [] Demonstrates/verbalizes HEP/Ed previously given. Plan: [x] Continue current frequency toward long and short term goals.     [x] Specific Instructions for subsequent treatments:     Time In: 1700  Time Out: 4342    Electronically signed by:  Eleanor Villarreal PT

## 2020-08-17 NOTE — FLOWSHEET NOTE
[] [facility] &  Therapy  [address]  [phone]  F: [phone] [] [address]  [facility]   [address]  P: [phone]  F: [phone] [x] [address] &  Therapy  [address]  P: [phone]  F: [phone] [] [address]  [facility]   [address]   [place]: [phone]  F: [phone]      Physical Therapy Daily Treatment Note     Date:  2020  Patient Name:  Ronald Bustos    :  1960  MRN: [id number]  Physician: Dr. Obed Garcia: MMO (Unlimited)  Medical Diagnosis: R achilles tendonitis, LBP    Rehab Codes: M75.571, M54.5  Onset date: 20                            Next Dr's appt.: prn  Visit# / total visits:    Cancels/No Shows: 0    Subjective:    Pain:  [x] Yes  [] No Location: R achilles Pain Rating: (0-10 scale) 0/10 while running; 1-2/10 worst and occasional  Pain altered Tx:  [x] No  [] Yes  Action:  Comments: Pt reports he continues to have no 0/10 had no pain when running, but is  and swollen on AT. Rates himself at 80-90% and the remaining symptoms are 1. Continued tenderness on AT to touch. 2. Residual discomfort upon walking after sitting for prolonged periods of time.      Objective:  Modalities:      Treatment Location  Left      Right                          Position   Achilles []          [x]  [] Supine    [x] Prone   [] Side lying  [] Sitting          Treatment Modality   3 Iontophoresis:  IOGEL:  [] 1.5ml   [x] 2.5ml   [] 3.5ml                                            [] 1.0ml   [] 1.3ml                 Dosage:  [] 24 mA/min   [x] 40 mA/min  []  80 mA/min                Channels:   [x] 1    [] 2                [x] 4mg/ml Dexamethasone Sodium Phosphate Dosage 24-80 mAmin                [] Acetic Acid       [] be met in 5 treatments)  1. ? 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 ? ROM:Normal hip extension and ankle DF knee straight to aide in LE mechanics  3. ? Strength: 5/5 hip strength to aide LE run mechanics  4. ? Function: Able to hold postural corrections made with manual techniques to aide LE mechanics  5. LEFI 70/80  6. Independent with Home Exercise Programs     LTG: (to be met in 10 treatments)  1. Pain 0/10 achilles pain  2. Pt able to demonstrate little to no foot/knee deviation and be able to perform SL squat in a glute dominant fashion  3. LEFI 80/80  4. Able to run as he wishes painfree with acceptable run form to reduce load on calf                    Patient goals:painfree running    Pt. Education:  [x] Yes  [] No  [] Reviewed Prior HEP/Ed  Method of Education: [x] Verbal  [x] Demo  [] Written  Comprehension of Education:  [x] Verbalizes understanding. [x] Demonstrates understanding. [] Needs review. [] Demonstrates/verbalizes HEP/Ed previously given. Plan: [] Continue current frequency toward long and short term goals.   Contact Dr Oli Chicas re: future status   [] Specific Instructions for subsequent treatments:     Time In: 1802  Time Out: 1838    Electronically signed by:  Horace Mcarthur, PT

## 2020-08-21 ENCOUNTER — HOSPITAL ENCOUNTER (OUTPATIENT)
Dept: PHYSICAL THERAPY | Facility: CLINIC | Age: 60
Setting detail: THERAPIES SERIES
Discharge: HOME | End: 2020-08-21
Payer: COMMERCIAL

## 2020-08-21 NOTE — DISCHARGE SUMMARY
[] [facility] &  Therapy  [address]  [phone]  F: [phone] [] [address]  [place] [address]   [address]  P: [phone]  F: [phone] [] [address]  Therapy  [address] [facility]  P: [phone]  F: [phone] [] [address]  [facility]   [address]   [place]: [phone]  F: [phone]      Physical Therapy Discharge Note    Date: 2020      Patient: Sylvia Ramires  : 1960  MRN: [id number]    Physician: Dr. Hills                                        Insurance: MMO (Unlimited)  Medical Diagnosis: R achilles tendonitis, LBP             Rehab Codes: M75.571, M54.5  Onset date: 20                               Next 's appt.: prn  Visit# / total visits:                               Cancels/No Shows: 0        Treatment to Date:  [x] Therapeutic Exercise    [] Modalities:  [] Therapeutic Activity     [] Ultrasound  [] Electrical Stimulation  [] Gait Training     [] Massage       [] Lumbar/Cervical Traction  [] Neuromuscular Re-education [] Cold/hotpack [x] Iontophoresis: 4 mg/mL  [x] Instruction in Home Exercise Program                     Dexamethasone Sodium  [x] Manual Therapy             Phosphate 40-80 mAmin  [] Aquatic Therapy                   [x] Vasocompression/    [] Other:             Game Ready    Discharge Status:     [] Pt recovered from conditions. Treatment goals were met. [x] Pt received maximum benefit. No further therapy indicated at this time. [] Pt to continue exercise/home instructions independently. [] Therapy interrupted due to:    [] Pt has 2 or more no shows/cancels, is discontinued per our policy. [] Pt has completed prescribed number of treatment sessions.     [x] Other: TC from Dr Sue Maldonado.  DC formal PT at this time and continue with HEP. The pain with palpation and enlargement on the AT may never go away per Dr. Mary Anne Raya.          Electronically signed by Natalya Ramirez PT on 8/21/2020 at 4:22 PM      If you have any questions or concerns, please don't hesitate to call.   Thank you for your referral.

## 2020-08-22 ENCOUNTER — HOSPITAL ENCOUNTER (OUTPATIENT)
Dept: MRI IMAGING | Age: 60
Discharge: HOME | End: 2020-08-24
Payer: COMMERCIAL

## 2020-08-22 PROCEDURE — 72148 MRI LUMBAR SPINE W/O DYE: CPT

## 2020-08-24 NOTE — RESULT ENCOUNTER NOTE
Right-sided degenerative disc disease at L4-5. Moderate. Please refer him to neuro surg if needed. Thank you.

## 2020-08-27 ENCOUNTER — PATIENT MESSAGE (OUTPATIENT)
Dept: FAMILY MEDICINE CLINIC | Age: 60
End: 2020-08-27

## 2020-09-14 ENCOUNTER — OFFICE VISIT (OUTPATIENT)
Dept: NEUROSURGERY | Age: 60
End: 2020-09-14
Payer: COMMERCIAL

## 2020-09-14 VITALS
WEIGHT: 175 LBS | DIASTOLIC BLOOD PRESSURE: 88 MMHG | BODY MASS INDEX: 24.5 KG/M2 | TEMPERATURE: 96.6 F | SYSTOLIC BLOOD PRESSURE: 167 MMHG | HEART RATE: 56 BPM | HEIGHT: 71 IN | OXYGEN SATURATION: 96 %

## 2020-09-14 PROCEDURE — 99203 OFFICE O/P NEW LOW 30 MIN: CPT | Performed by: NURSE PRACTITIONER

## 2020-09-14 NOTE — PROGRESS NOTES
Alexa Hooks  [facility] [address] [facility] [address]  Dept: [phone]    Patient:  Swathi Alesha  YOB: 1960  Date: 9/14/20    The patient is a 64 y.o. male who presents today for consult of the following problems:     Chief Complaint   Patient presents with    New Patient     Degenerative disc disease-lumbar    Results     MRI Whit Vazquez         HPI:     Swathi Alesha is a 64 y.o. male on whom neurosurgical consultation was requested by Maikel Davila MD for management of back and leg pain. Pt has had low back pain for the last 10+ years. Currently states that pain is 1-2/10, can reach as high as 7/10 with increased physical activity. Pain is described as a dull ache. Denies any radiation into legs. Denies any saddle anesthesia, no loss of bowel or bladder function. Physical activity exacerbates symptoms, rest alleviates symptoms. Has been participating in physical therapy, does follow with a chiropractor. Does feel that symptoms are progressive and are starting to interfere with his ability to complete activities he enjoys such as running. Groin pain: No  Saddle anesthesia: No  Hip Pain: No  Bowel/bladder incontinence: No  Constipation or Urinary retention: No    LIMITATIONS    Pain significantly limiting from a daily standpoint.    Assistive devices: none  Daily pharmacologic pain control include: aleve  Back versus leg: all back    MANAGEMENT     Prior Surgery: No  Prior to 1yr ago: PT, injections  In the last year:    Physical Therapy: Yes   Chiropractic Interventions: Yes   Injections: No  Improvement: n/a    Types of injections/responses: n/a    History:     Past Medical History:   Diagnosis Date    Arthritis     in hip    Asthma     GERD (gastroesophageal reflux disease)      Past Surgical History:   Procedure Laterality Date    COLONOSCOPY  04/06/2018    polyp removed    COLONOSCOPY N/A 4/6/2018    COLONOSCOPY POLYPECTOMY SNARE/COLD BIOPSY performed by Leonora Luke MD at [address] [place] [address]       Family History   Problem Relation Age of Onset    No Known Problems Mother     Diabetes Father      Current Outpatient Medications on File Prior to Visit   Medication Sig Dispense Refill    beclomethasone (QVAR REDIHALER) 40 MCG/ACT AERB inhaler Inhale 1 puff into the lungs 2 times daily 1 Inhaler 3    omeprazole (PRILOSEC) 20 MG delayed release capsule TAKE 1 CAPSULE BY MOUTH 2 TIMES A  capsule 1    SUMAtriptan (IMITREX) 100 MG tablet TAKE 1 TABLET BY MOUTH ONCE AS NEEDED FOR MIGRAINE 9 tablet 0    cromolyn (NASALCROM) 5.2 MG/ACT nasal spray 1 spray by Nasal route 3 times daily 1 Bottle 3    albuterol sulfate HFA (PROAIR HFA) 108 (90 Base) MCG/ACT inhaler Inhale 2 puffs into the lungs every 6 hours as needed for Wheezing 1 Inhaler 3    mometasone (ASMANEX, 60 METERED DOSES,) 220 MCG/INH inhaler Inhale 1 puff into the lungs 2 times daily (Patient not taking: Reported on 9/14/2020) 1 Inhaler 11    montelukast (SINGULAIR) 10 MG tablet Take 1 tablet by mouth nightly (Patient not taking: Reported on 9/14/2020) 30 tablet 11     No current facility-administered medications on file prior to visit. Social History     Tobacco Use    Smoking status: Former Smoker     Years: 10.00     Types: Cigars    Smokeless tobacco: Never Used    Tobacco comment: patient states 4 cigars a year    Substance Use Topics    Alcohol use: Yes     Comment: socially    Drug use: No       Allergies   Allergen Reactions    Ciprofloxacin Dermatitis       Review of Systems  Constitutional: Negative for activity change and appetite change. HENT: Negative for ear pain and facial swelling. Eyes: Negative for discharge and itching. Respiratory: Negative for choking and chest tightness. Cardiovascular: Negative for chest pain and leg swelling.    Gastrointestinal: Negative for nausea and abdominal pain.   Endocrine: Negative for cold intolerance and heat intolerance. Genitourinary: Negative for frequency and flank pain. Musculoskeletal: Negative for myalgias and joint swelling. Skin: Negative for rash and wound. Allergic/Immunologic: Negative for environmental allergies and food allergies. Hematological: Negative for adenopathy. Does not bruise/bleed easily. Psychiatric/Behavioral: Negative for self-injury. The patient is not nervous/anxious. Physical Exam:      BP (!) 167/88 (Site: Right Upper Arm, Position: Sitting, Cuff Size: Medium Adult)   Pulse 56   Temp 96.6 °F (35.9 °C) (Temporal)   Ht 5' 11\" (1.803 m)   Wt 175 lb (79.4 kg)   SpO2 96%   BMI 24.41 kg/m²   Estimated body mass index is 24.41 kg/m² as calculated from the following:    Height as of this encounter: 5' 11\" (1.803 m). Weight as of this encounter: 175 lb (79.4 kg). General:  Ar Lizarraga is a 64y.o. year old male who appears his stated age. HEENT: Normocephalic atraumatic. Neck supple. Chest: regular rate; pulses equal  Abdomen: Soft nontender nondistended.    Ext: DP and PT pulses 2+, good cap refill  Neuro    Mentation  Appropriate affect  Registration intact  Orientation intact    Cranial Nerves:   Pupils equal and reactive to light  Extraocular motion intact  Face and shrug symmetric  Tongue midline  No dysarthria  v1-3 sensation symmetric, masseter tone symmetric  Hearing symmetric and intact    Sensation: intact    Motor  L deltoid 5/5; R deltoid 5/5  L biceps 5/5; R biceps 5/5  L triceps 5/5; R triceps 5/5  L wrist extension 5/5; R wrist extension 5/5  L intrinsics 5/5; R intrinsics 5/5     L iliopsoas 5/5 , R iliopsoas 5/5  L quadriceps 5/5; R quadriceps 5/5  L Dorsiflexion 5/5; R dorsiflexion 5/5  L Plantarflexion 5/5; R plantarflexion 5/5  L EHL 5/5; R EHL 5/5    Reflexes  L Brachioradialis 2+/4; R brachioradialis 2+/4  L Biceps 2+/4; R Biceps 2+/4  L Triceps 2+/4; R Triceps 2+/4  L Patellar 2+/4: R Patellar 2+/4  L Achilles 2+/4; R Achilles 2+/4    hoffmans L: neg  hoffmans R: neg  Clonus L: neg  Clonus R: neg  Babinski L: neg  Babinski R: neg    FAIZA: Negative  Straight leg raise: Negative  SI joint tenderness: Negative    Studies Review:     MRI lumbar spine 8/22/2020:  FINDINGS:    BONES/ALIGNMENT: There is normal alignment of the spine. The vertebral body    heights are maintained. The bone marrow signal appears unremarkable.         SPINAL CORD: The conus terminates normally.         SOFT TISSUES: No paraspinal mass identified.         L1-L2: There is no significant disc herniation, spinal canal stenosis or    neural foraminal narrowing.         L2-L3: There is no significant disc herniation, spinal canal stenosis or    neural foraminal narrowing.         L3-L4: There is no significant disc herniation, spinal canal stenosis or    neural foraminal narrowing.         L4-L5: Moderate degenerative disc changes along the right side of the    endplates with Modic type 1 endplate marrow signal change and a small    Schmorl's node.  Disc bulge causes mild right foraminal stenosis.         L5-S1: There is no significant disc herniation, spinal canal stenosis or    neural foraminal narrowing. Assessment and Plan:      1. Lumbar facet arthropathy          Plan: Patient presents with longstanding progressive axial lumbar pain. Is physically active, enjoys running, biking and numerous physical activities that do tend to exacerbate his axial pain. Denies any numbness tingling, changes to bowel bladder function or saddle anesthesia. Has been participating in physical therapy as well as chiropractor care. Has not had any pain management evaluation or interventions thus far. At this point given lack of red flag findings, and as the symptoms are primarily axial in nature, recommend evaluation by pain management to discuss possible facet/MMBs. We will see the patient back in approximately 3 months.     Followup: Return in about 3 months (around 12/14/2020), or if symptoms worsen or fail to improve. Prescriptions Ordered:  No orders of the defined types were placed in this encounter. Orders Placed:  Orders Placed This Encounter   Procedures    [facility]     Referral Priority:   Routine     Referral Type:   Eval and Treat     Referral Reason:   Specialty Services Required     Requested Specialty:   Pain Management     Number of Visits Requested:   1        Electronically signed by SLIME Kearney CNP on 9/16/2020 at 8:22 AM    Please note that this chart was generated using voice recognition Dragon dictation software. Although every effort was made to ensure the accuracy of this automated transcription, some errors in transcription may have occurred.

## 2020-10-16 ENCOUNTER — NURSE ONLY (OUTPATIENT)
Dept: FAMILY MEDICINE CLINIC | Age: 60
End: 2020-10-16
Payer: COMMERCIAL

## 2020-10-16 PROCEDURE — 90471 IMMUNIZATION ADMIN: CPT | Performed by: FAMILY MEDICINE

## 2020-10-16 PROCEDURE — 90686 IIV4 VACC NO PRSV 0.5 ML IM: CPT | Performed by: FAMILY MEDICINE

## 2020-10-16 NOTE — PROGRESS NOTES
Vaccine Information Sheet, \"Influenza - Inactivated\"  given to Beena Mcneal, or parent/legal guardian of  Beena Mcneal and verbalized understanding. Patient responses:    Have you ever had a reaction to a flu vaccine? No  Do you have any current illness? No  Have you ever had Guillian [place] Syndrome? No  Do you have a serious allergy to any of the following: Neomycin, Polymyxin, Thimerosal, eggs or egg products? No    Flu vaccine given per order. Please see immunization tab. Risks and benefits explained. Current VIS given.

## 2020-10-19 VITALS — BODY MASS INDEX: 24.27 KG/M2 | WEIGHT: 174 LBS

## 2020-10-20 ENCOUNTER — INITIAL CONSULT (OUTPATIENT)
Dept: PAIN MANAGEMENT | Age: 60
End: 2020-10-20
Payer: COMMERCIAL

## 2020-10-20 VITALS
HEART RATE: 69 BPM | BODY MASS INDEX: 24.92 KG/M2 | WEIGHT: 178 LBS | RESPIRATION RATE: 22 BRPM | DIASTOLIC BLOOD PRESSURE: 78 MMHG | HEIGHT: 71 IN | OXYGEN SATURATION: 98 % | SYSTOLIC BLOOD PRESSURE: 124 MMHG

## 2020-10-20 PROCEDURE — 99244 OFF/OP CNSLTJ NEW/EST MOD 40: CPT | Performed by: ANESTHESIOLOGY

## 2020-10-20 RX ORDER — MELOXICAM 15 MG/1
15 TABLET ORAL DAILY
Qty: 30 TABLET | Refills: 0 | Status: SHIPPED | OUTPATIENT
Start: 2020-10-20 | End: 2021-03-29

## 2020-10-20 RX ORDER — TIZANIDINE 4 MG/1
4 TABLET ORAL NIGHTLY
Qty: 30 TABLET | Refills: 0 | Status: SHIPPED | OUTPATIENT
Start: 2020-10-20 | End: 2020-11-19

## 2020-10-20 ASSESSMENT — ENCOUNTER SYMPTOMS
RESPIRATORY NEGATIVE: 1
EYES NEGATIVE: 1
ALLERGIC/IMMUNOLOGIC NEGATIVE: 1
BACK PAIN: 1
GASTROINTESTINAL NEGATIVE: 1

## 2020-10-20 NOTE — PROGRESS NOTES
The patient is a 64 y. o. Non-/non  male. Chief Complaint   Patient presents with    Back Pain    Consultation        HPI  Requesting physician for the evaluation of Rachelle Mccrary 1960: Zuly Hart    Pain History  82-year-old man with history of lower back pain  Onset of symptoms 10 years ago no particular injury associated with the pain onset  Pain have progressively been worsening  Describes it as dull aching pain sensation  No radiation of pain in leg  No associated numbness or paresthesia  No changes in bladder or bowel control  No history of fever chills or weight loss    Awaiting factors include lifting bending twisting turning and daily work which is very physical work for him  Alleviating factors include rest    Simply completed physical therapy without any improvement  tried NSAIDs without any relief  Have a recent MRI lumbar spine available in epic  Recall some back injection 10 years ago  No previous back surgical history  No spine injection first and years  Pain is progressively worsening and affecting daily life activities  Pain score today  5  1. Location:lumbar back  2. Radiation:no  3. Character:dull,sharp  5. Duration:10  6. Onset: years  7. Did an injury cause pain: no  8. Aggravating factors:bending, standing  9. Alleviating factors:aleve  10. Associated symptoms (numbness / tingling / weakness):  no  -Where at:n/a  -Down into finger tips or toes (specify which finger or toes):no  -constant or intermitting: constant  11. Red Flags: (weight loss / chills / loss of bladder or bowel control):no    Previous management history  1. Previous diagnostic workup: (Imaging/EMG)   CT, MRI, or Xray: x ray, MRI  What part of the body:back, pelvis  [facility]  What year or specific date: June / 2020  EMG:  no    2.  Previous non interventional treatments tried:  chiropractor or physical therapy: yes both  What part of the body:back  What facility was it done Tobacco comment: patient states 4 cigars a year    Substance and Sexual Activity    Alcohol use: Yes     Comment: socially    Drug use: No    Sexual activity: Yes   Lifestyle    Physical activity     Days per week: None     Minutes per session: None    Stress: None   Relationships    Social connections     Talks on phone: None     Gets together: None     Attends [employer] service: None     Active member of club or organization: None     Attends meetings of clubs or organizations: None     Relationship status: None    Intimate partner violence     Fear of current or ex partner: None     Emotionally abused: None     Physically abused: None     Forced sexual activity: None   Other Topics Concern    None   Social History Narrative    None     Family History   Problem Relation Age of Onset    No Known Problems Mother     Diabetes Father      Allergies   Allergen Reactions    Ciprofloxacin Dermatitis     Ciprofloxacin   Vitals:    10/20/20 1224   BP: 124/78   Pulse: 69   Resp: 22   SpO2: 98%     Current Outpatient Medications   Medication Sig Dispense Refill    meloxicam (MOBIC) 15 MG tablet Take 1 tablet by mouth daily 30 tablet 0    tiZANidine (ZANAFLEX) 4 MG tablet Take 1 tablet by mouth nightly 30 tablet 0    beclomethasone (QVAR REDIHALER) 40 MCG/ACT AERB inhaler Inhale 1 puff into the lungs 2 times daily 1 Inhaler 3    omeprazole (PRILOSEC) 20 MG delayed release capsule TAKE 1 CAPSULE BY MOUTH 2 TIMES A  capsule 1    cromolyn (NASALCROM) 5.2 MG/ACT nasal spray 1 spray by Nasal route 3 times daily 1 Bottle 3    albuterol sulfate HFA (PROAIR HFA) 108 (90 Base) MCG/ACT inhaler Inhale 2 puffs into the lungs every 6 hours as needed for Wheezing 1 Inhaler 3    SUMAtriptan (IMITREX) 100 MG tablet TAKE 1 TABLET BY MOUTH ONCE AS NEEDED FOR MIGRAINE 9 tablet 0    mometasone (ASMANEX, 60 METERED DOSES,) 220 MCG/INH inhaler Inhale 1 puff into the lungs 2 times daily (Patient not taking: Reported on 10/20/2020) 1 Inhaler 11    montelukast (SINGULAIR) 10 MG tablet Take 1 tablet by mouth nightly (Patient not taking: Reported on 10/20/2020) 30 tablet 11     No current facility-administered medications for this visit. Review of Systems   Constitutional: Positive for activity change. Negative for fever. HENT: Negative. Eyes: Negative. Respiratory: Negative. Cardiovascular: Negative. Gastrointestinal: Negative. Endocrine: Negative. Genitourinary: Negative. Musculoskeletal: Positive for back pain. Skin: Negative. Allergic/Immunologic: Negative. Neurological: Negative. Hematological: Negative. Psychiatric/Behavioral: Negative. Objective:  General Appearance:  Uncomfortable, in pain and well-appearing. Vital signs: (most recent): Blood pressure 124/78, pulse 69, resp. rate 22, height 5' 11\" (1.803 m), weight 178 lb (80.7 kg), SpO2 98 %. Vital signs are normal.  No fever. Output: Producing urine and producing stool. HEENT: Normal HEENT exam.    Lungs:  Normal effort and normal respiratory rate. Breath sounds clear to auscultation. He is not in respiratory distress. No decreased breath sounds. Heart: Normal rate. Regular rhythm. Abdomen: Abdomen is soft. There is no abdominal tenderness. Extremities: Normal range of motion. There is no deformity. Neurological: Patient is alert and oriented to person, place and time. Normal strength. Patient has normal reflexes, normal muscle tone and normal coordination. Pupils:  Pupils are equal, round, and reactive to light. Pupils are equal.   Skin:  Warm and dry. No rash or cyanosis.       Lumbarspine examination  No apparent deformity on inspection  Range of motion limited and associated with pain  Pain aggravated with forward flexion  Straight leg raise negative    Neurological examination  Normal muscle mass, normal muscle tone, muscle strength upper-both lower extremities  Deep tendon reflexes 2+ and symmetric  Gait stable    Assessment & Plan   Pain History  62-year-old man with history of lower back pain  Onset of symptoms 10 years ago no particular injury associated with the pain onset  Pain have progressively been worsening  Describes it as dull aching pain sensation  No radiation of pain in leg  No associated numbness or paresthesia  No changes in bladder or bowel control  No history of fever chills or weight loss    Awaiting factors include lifting bending twisting turning and daily work which is very physical work for him  Alleviating factors include rest    Simply completed physical therapy without any improvement  tried NSAIDs without any relief  Have a recent MRI lumbar spine available in epic  Recall some back injection 10 years ago  No previous back surgical history  No spine injection first and years  Pain is progressively worsening and affecting daily life activities  Pain score today  5    EXAMINATION: MRI OF THE LUMBAR SPINE WITHOUT CONTRAST, 8/22/2020 11:42 am    L4-L5: Moderate degenerative disc changes along the right side of the endplates with Modic type 1 endplate marrow signal change and a small Schmorl's node. Disc bulge causes mild right foraminal stenosis. 1. DDD (degenerative disc disease), lumbar    2.  Chronic right-sided low back pain without sciatica        Lumbar spine MRI   Report was reviewed  Images were reviewed independently  Images were shown to the patient  Pertinent finding include L4-L5 level degenerative disc changes with inferior endplate of L4 showing Modic type I vertebral changes suggesting ongoing vertebral body pain  Also shows disc bulge at that level    Patient failed conservative measures pain is severe and affecting daily life activity  I will recommend for lumbar epidural steroid injection  We will start on Mobic and Zanaflex  If this fails then consider for basivertebral nerve ablation    Alteration note sent to the referring physician  Orders Placed This Encounter   Procedures    CO INJECT ANES/STEROID FORAMEN LUMBAR/SACRAL W IMG GUIDE ,1 LEVEL      Orders Placed This Encounter   Medications    meloxicam (MOBIC) 15 MG tablet     Sig: Take 1 tablet by mouth daily     Dispense:  30 tablet     Refill:  0    tiZANidine (ZANAFLEX) 4 MG tablet     Sig: Take 1 tablet by mouth nightly     Dispense:  30 tablet     Refill:  0          Electronically signed by Shawnie Canavan, MD on 10/20/2020 at 1:05 PM

## 2020-11-16 ENCOUNTER — APPOINTMENT (OUTPATIENT)
Dept: GENERAL RADIOLOGY | Age: 60
End: 2020-11-16
Attending: ANESTHESIOLOGY
Payer: COMMERCIAL

## 2020-11-16 ENCOUNTER — HOSPITAL ENCOUNTER (OUTPATIENT)
Age: 60
Setting detail: OUTPATIENT SURGERY
Discharge: HOME | End: 2020-11-16
Attending: ANESTHESIOLOGY | Admitting: ANESTHESIOLOGY
Payer: COMMERCIAL

## 2020-11-16 VITALS
HEART RATE: 46 BPM | TEMPERATURE: 96.8 F | WEIGHT: 175 LBS | SYSTOLIC BLOOD PRESSURE: 169 MMHG | BODY MASS INDEX: 24.5 KG/M2 | RESPIRATION RATE: 18 BRPM | DIASTOLIC BLOOD PRESSURE: 93 MMHG | HEIGHT: 71 IN | OXYGEN SATURATION: 99 %

## 2020-11-16 PROBLEM — M51.369 DDD (DEGENERATIVE DISC DISEASE), LUMBAR: Chronic | Status: ACTIVE | Noted: 2020-11-16

## 2020-11-16 PROBLEM — M51.36 DDD (DEGENERATIVE DISC DISEASE), LUMBAR: Chronic | Status: ACTIVE | Noted: 2020-11-16

## 2020-11-16 PROCEDURE — 2709999900 HC NON-CHARGEABLE SUPPLY: Performed by: ANESTHESIOLOGY

## 2020-11-16 PROCEDURE — 6360000004 HC RX CONTRAST MEDICATION: Performed by: ANESTHESIOLOGY

## 2020-11-16 PROCEDURE — 64483 NJX AA&/STRD TFRM EPI L/S 1: CPT | Performed by: ANESTHESIOLOGY

## 2020-11-16 PROCEDURE — 99152 MOD SED SAME PHYS/QHP 5/>YRS: CPT | Performed by: ANESTHESIOLOGY

## 2020-11-16 PROCEDURE — 64484 NJX AA&/STRD TFRM EPI L/S EA: CPT | Performed by: ANESTHESIOLOGY

## 2020-11-16 PROCEDURE — 3600000050 HC PAIN LEVEL 1 BASE: Performed by: ANESTHESIOLOGY

## 2020-11-16 PROCEDURE — 7100000010 HC PHASE II RECOVERY - FIRST 15 MIN: Performed by: ANESTHESIOLOGY

## 2020-11-16 PROCEDURE — 2500000003 HC RX 250 WO HCPCS: Performed by: ANESTHESIOLOGY

## 2020-11-16 PROCEDURE — 7100000011 HC PHASE II RECOVERY - ADDTL 15 MIN: Performed by: ANESTHESIOLOGY

## 2020-11-16 PROCEDURE — 6360000002 HC RX W HCPCS: Performed by: ANESTHESIOLOGY

## 2020-11-16 PROCEDURE — 3209999900 FLUORO FOR SURGICAL PROCEDURES

## 2020-11-16 RX ORDER — MIDAZOLAM HYDROCHLORIDE 1 MG/ML
INJECTION INTRAMUSCULAR; INTRAVENOUS PRN
Status: DISCONTINUED | OUTPATIENT
Start: 2020-11-16 | End: 2020-11-16 | Stop reason: ALTCHOICE

## 2020-11-16 RX ORDER — SODIUM CHLORIDE 0.9 % (FLUSH) 0.9 %
10 SYRINGE (ML) INJECTION PRN
Status: DISCONTINUED | OUTPATIENT
Start: 2020-11-16 | End: 2020-11-16 | Stop reason: HOSPADM

## 2020-11-16 RX ORDER — SODIUM CHLORIDE 0.9 % (FLUSH) 0.9 %
10 SYRINGE (ML) INJECTION EVERY 12 HOURS SCHEDULED
Status: DISCONTINUED | OUTPATIENT
Start: 2020-11-16 | End: 2020-11-16 | Stop reason: HOSPADM

## 2020-11-16 RX ORDER — LIDOCAINE HYDROCHLORIDE 10 MG/ML
INJECTION, SOLUTION EPIDURAL; INFILTRATION; INTRACAUDAL; PERINEURAL PRN
Status: DISCONTINUED | OUTPATIENT
Start: 2020-11-16 | End: 2020-11-16 | Stop reason: ALTCHOICE

## 2020-11-16 RX ORDER — FENTANYL CITRATE 50 UG/ML
INJECTION, SOLUTION INTRAMUSCULAR; INTRAVENOUS PRN
Status: DISCONTINUED | OUTPATIENT
Start: 2020-11-16 | End: 2020-11-16 | Stop reason: ALTCHOICE

## 2020-11-16 RX ORDER — DEXAMETHASONE SODIUM PHOSPHATE 10 MG/ML
INJECTION INTRAMUSCULAR; INTRAVENOUS PRN
Status: DISCONTINUED | OUTPATIENT
Start: 2020-11-16 | End: 2020-11-16 | Stop reason: ALTCHOICE

## 2020-11-16 ASSESSMENT — PAIN SCALES - GENERAL
PAINLEVEL_OUTOF10: 0
PAINLEVEL_OUTOF10: 2
PAINLEVEL_OUTOF10: 0
PAINLEVEL_OUTOF10: 0
PAINLEVEL_OUTOF10: 2

## 2020-11-16 ASSESSMENT — PAIN - FUNCTIONAL ASSESSMENT
PAIN_FUNCTIONAL_ASSESSMENT: PREVENTS OR INTERFERES SOME ACTIVE ACTIVITIES AND ADLS
PAIN_FUNCTIONAL_ASSESSMENT: 0-10

## 2020-11-16 ASSESSMENT — PAIN DESCRIPTION - DESCRIPTORS: DESCRIPTORS: DULL;CONSTANT

## 2020-11-16 NOTE — OP NOTE
Operative Note      Patient: Cyndee Biswas  YOB: 1960  MRN: [id number]    Date of Procedure: 11/16/2020    Pre-Op Diagnosis: DX DDD  CHRONIC RIGHT SIDED LOW BACK PAIN W/O SCIATICA    Post-Op Diagnosis: Same       Procedure(s):  RIGHT L4 AND L5 EPIDURAL STEROID INJECTION    Surgeon(s):  Kelsea Hilton MD    Assistant:   * No surgical staff found *    Anesthesia: IV Sedation    Estimated Blood Loss (mL): Minimal    Complications: None    Specimens:   * No specimens in log *    Implants:  * No implants in log *      Drains: * No LDAs found *    Findings: n/a    Detailed Description of Procedure:     Patient Name: Cyndee Biswas   YOB: 1960  Room/Bed: [facility] OR Pool/NONE  Medical Record Number: [id number]  Date: 11/16/2020       Preoperative Diagnosis:    Chronic right-sided lower back pain  Lumbar degenerative disc disease    Postoperative diagnosis:   Chronic right-sided lower back pain  Lumbar degenerative disc disease    Blood Loss: none    Procedure Performed:  Transforaminal lumbar epidural steroid injection at the levels of L4 and L5 on the Right side under fluoroscopic guidance. Procedure: The Patient was seen in the preop area, chart was reviewed, informed consent was obtained. Patient was taken to procedure room and was placed in prone position. Vital signs were monitored through out the  Procedure. A time out was completed. The skin over the back was prepped and draped in sterile manner. The target point was marked in ipsilateral obliques just below the pedicle at the target levels. Skin and deep tissues were anesthetized with 1 % lidocaine. A 20-gauge, spinal needle was advanced  under fluoroscopy guidance in AP / Oblique and lateral views, such that the tip of the needle lies in the neuroforamina at about the 6 o'clock position under the pedicle of the target vertebra. This was confirmed with AP and lateral views of the fluoroscopy.      Then after negative aspiration contrast dye Omnipaque-180 was injected with live fluoroscopy in AP views that showed  spread of the contrast in the epidural space  and no vascular runoff or intrathecal spread. Finally 3 ml of treatment solution containing 5 ml of  1 % PF lidocaine and 1 ml of dexamethasone 10 mg / ml was injected. The needle was removed and a Band-Aid was placed over the needle  insertion site. The patient's vital signs remained stable and the patient tolerated the procedure well. The same procedure was then repeated at right at L 5level with same technique. The remaining 3 ml of treatment solution was injected at that. The total dose of steroid injected today was dexamethasone 10 mg. Electronically signed by Angeles Trjuillo MD on 11/16/2020 at 3:31 PM  SEDATION NOTE:    ASA CLASSIFICATION  2  MP   CLASSIFICATION  2    · Moderate intravenous conscious sedation was supervised by Dr. Nani Mejía  . The patient was independently monitored by a Registered Nurse assigned to the Procedure Room  . Monitoring included automated blood pressure, continuous EKG, Capnography and continuous pulse oximetry. . The detailed Conscious Record is permanently stored in the [place] [id number].      The following is the conscious sedation record;  Start Time:  1515  End times:  1528  Duration:  13 minutes  MEDS GIVEN 2 MG VERSED AND 50 Roheline 43          Electronically signed by Angeles Trujillo MD on 11/16/2020 at 3:31 PM

## 2020-11-16 NOTE — H&P
History and Physical Update    Pt Name: Celestina Park  MRN: [id number]  YOB: 1960  Date of evaluation: 11/16/2020      [x] I have reviewed the pain management progress note found in Epic by Dr. Dexter Myers from 10/20/2020 which meets the criteria for an Interval History and Physical note. [x] I have examined Celestina Park a 64 y.o., male who is scheduled for right L4 and L5 epidural steroid injections by Dr. Dexter Myers due to DDD, chronic right sided low back pain without sciatica. The patient denies health changes since his appointment with Dr. Dexter Myers on 10/20/2020. Pt denies fever, chills, productive cough, SOB, chest pain, open sores, rashes, and wounds. Pt denies history of diabetes. Mobic was last taken on 11/09/2020. Vital signs: BP (!) 149/96   Pulse 56   Temp 96.7 °F (35.9 °C) (Temporal)   Resp 18   Ht 5' 11\" (1.803 m)   Wt 175 lb (79.4 kg)   SpO2 98%   BMI 24.41 kg/m²      Allergies:  Ciprofloxacin    Past medical history, surgical history, social history, and family history were reviewed and updated in EPIC as indicated. Medications:    Prior to Admission medications    Medication Sig Start Date End Date Taking?  Authorizing Provider   tiZANidine (ZANAFLEX) 4 MG tablet Take 1 tablet by mouth nightly 10/20/20 11/19/20 Yes Destinee Mendez MD   beclomethasone (QVAR REDIHALER) 40 MCG/ACT AERB inhaler Inhale 1 puff into the lungs 2 times daily 4/28/20  Yes James Baird MD   omeprazole (PRILOSEC) 20 MG delayed release capsule TAKE 1 CAPSULE BY MOUTH 2 TIMES A DAY 2/19/20  Yes James Baird MD   montelukast (SINGULAIR) 10 MG tablet Take 1 tablet by mouth nightly 6/25/19  Yes Shady Cox DO   albuterol sulfate HFA (PROAIR HFA) 108 (90 Base) MCG/ACT inhaler Inhale 2 puffs into the lungs every 6 hours as needed for Wheezing 11/14/18  Yes James Baird MD   meloxicam (MOBIC) 15 MG tablet Take 1 tablet by mouth daily 10/20/20   Destinee Mendez MD   SUMAtriptan (IMITREX) 100 MG tablet TAKE 1 TABLET BY MOUTH ONCE AS NEEDED FOR MIGRAINE 10/16/19 9/14/20  Neris Joseph MD   cromolyn (NASALCROM) 5.2 MG/ACT nasal spray 1 spray by Nasal route 3 times daily 10/14/19   Melissa Cox DO   mometasone (ASMANEX, 60 METERED DOSES,) 220 MCG/INH inhaler Inhale 1 puff into the lungs 2 times daily  Patient not taking: Reported on 10/20/2020 9/5/19   Torres Chow DO       This is a 64 y.o. male who is pleasant, cooperative, alert and oriented x 3, in no acute distress. Heart: Asymptomatic, mild bradycardia. HR 56 BPM. Regular rhythm without murmur, gallop, or rub. Lungs: Normal respiratory effort, unlabored, and clear to auscultation without wheezes or rales bilaterally. Abdomen: Soft, nontender, nondistended with active bowel sounds. Pedal pulses: 2+ bilaterally. Labs:  No results for input(s): HGB, HCT, WBC, MCV, PLT, NA, K, CL, CO2, BUN, CREATININE, GLUCOSE, INR, PROTIME, APTT, AST, ALT, LABALBU, HCG in the last 720 hours. No results for input(s): COVID19 in the last 720 hours. BRYAN Pastor-BC  Electronically signed 11/16/2020 at 2:52 PM      Queta Capellan MD    Physician    Specialty:  Pain Management    Progress Notes      Signed    Encounter Date:  10/20/2020          Related encounter: Initial consult from 10/20/2020 in [facility]          Signed        Expand All Collapse All     Show:Clear all  [x]Manual[x]Template[x]Copied    Added by:  [x]Ashley X Darrel Fothergill, MD    []Tank for details   The patient is a 64 y. o. Non-/non  male.      Chief Complaint   Patient presents with    Back Pain    Consultation         HPI  Requesting physician for the evaluation of Luis Fernando Post 1960: Shearon Shape     Pain History  61-year-old man with history of lower back pain  Onset of symptoms 10 years ago no particular injury associated with the pain onset  Pain have progressively been worsening  Describes it as dull aching pain sensation  No radiation of pain in leg  No associated numbness or paresthesia  No changes in bladder or bowel control  No history of fever chills or weight loss     Awaiting factors include lifting bending twisting turning and daily work which is very physical work for him  Alleviating factors include rest     Simply completed physical therapy without any improvement  tried NSAIDs without any relief  Have a recent MRI lumbar spine available in epic  Recall some back injection 10 years ago  No previous back surgical history  No spine injection first and years  Pain is progressively worsening and affecting daily life activities  Pain score today  5  1. Location:lumbar back  2. Radiation:no  3. Character:dull,sharp  5. Duration:10  6. Onset: years  7. Did an injury cause pain: no  8. Aggravating factors:bending, standing  9. Alleviating factors:aleve  10. Associated symptoms (numbness / tingling / weakness):  no  -Where at:n/a  -Down into finger tips or toes (specify which finger or toes):no  -constant or intermitting: constant  11. Red Flags: (weight loss / chills / loss of bladder or bowel control):no     Previous management history  1. Previous diagnostic workup: (Imaging/EMG)   CT, MRI, or Xray: x ray, MRI  What part of the body:back, pelvis  What facility did they have it at:[facility]  What year or specific date: June / 2020  EMG:  no     2. Previous non interventional treatments tried:  chiropractor or physical therapy: yes both  What part of the body:back  What facility was it done at: [facility] on [address]  How long ago was it last tried:months  Did it work: No  Did they complete it:Yes     3. Previous Medications tried  NSAID's: yes  Neurontin: no  Lyrica: no  Trycyclic antidepressant (Ellavil / Pamelor ): no  Cymbalta: no  Opioids (Ultram / Vicodin / Percocet / Morphine / Dilaudid / Oramorph/ Fentanyl etc.):percocet  Last Pain medication taken (name of med and date):      4.  Previous Interventional pain procedures tried:  What kind of injection:yes   Who did the injection: Dr. Danny Gilliam  did the injection help: yes  Last time injection was done:10 years ago     11.  Previous surgeries for pain  What part of the body did they have the surgery:no  What physician did the surgery:n/a  What Facility did they have the surgery done:n/a  Date of Surgery:N/A     Social History:  Marital status:  Employment History:yes  Working  Yes  Full time Or Part time: full time  Disability  No   Legal Issues related to pain complaint: No      Suzy results found for: LABA1C  No results found for: EAG        Informant: patient              Past Medical History        Past Medical History:   Diagnosis Date    Arthritis       in hip    Asthma      GERD (gastroesophageal reflux disease)           Past Surgical History         Past Surgical History:   Procedure Laterality Date    COLONOSCOPY   04/06/2018     polyp removed    COLONOSCOPY N/A 4/6/2018     COLONOSCOPY POLYPECTOMY SNARE/COLD BIOPSY performed by Cricket Briceno MD at [facility]            Social History   Social History            Socioeconomic History    Marital status:        Spouse name: None    Number of children: None    Years of education: None    Highest education level: None   Occupational History    None   Social Needs    Financial resource strain: None    Food insecurity       Worry: None       Inability: None    Transportation needs       Medical: None       Non-medical: None   Tobacco Use    Smoking status: Former Smoker       Years: 10.00       Types: Cigars    Smokeless tobacco: Never Used    Tobacco comment: patient states 4 cigars a year    Substance and Sexual Activity    Alcohol use: Yes       Comment: socially    Drug use: No    Sexual activity: Yes   Lifestyle    Physical activity       Days per week: None       Minutes per session: None    Stress: None   Relationships    Social connections medications for this visit. Review of Systems   Constitutional: Positive for activity change. Negative for fever. HENT: Negative. Eyes: Negative. Respiratory: Negative. Cardiovascular: Negative. Gastrointestinal: Negative. Endocrine: Negative. Genitourinary: Negative. Musculoskeletal: Positive for back pain. Skin: Negative. Allergic/Immunologic: Negative. Neurological: Negative. Hematological: Negative. Psychiatric/Behavioral: Negative. Objective:  General Appearance:  Uncomfortable, in pain and well-appearing. Vital signs: (most recent): Blood pressure 124/78, pulse 69, resp. rate 22, height 5' 11\" (1.803 m), weight 178 lb (80.7 kg), SpO2 98 %. Vital signs are normal.  No fever. Output: Producing urine and producing stool. HEENT: Normal HEENT exam.    Lungs:  Normal effort and normal respiratory rate. Breath sounds clear to auscultation. He is not in respiratory distress. No decreased breath sounds. Heart: Normal rate. Regular rhythm. Abdomen: Abdomen is soft. There is no abdominal tenderness. Extremities: Normal range of motion. There is no deformity. Neurological: Patient is alert and oriented to person, place and time. Normal strength. Patient has normal reflexes, normal muscle tone and normal coordination. Pupils:  Pupils are equal, round, and reactive to light. Pupils are equal.   Skin:  Warm and dry. No rash or cyanosis.        Lumbarspine examination  No apparent deformity on inspection  Range of motion limited and associated with pain  Pain aggravated with forward flexion  Straight leg raise negative     Neurological examination  Normal muscle mass, normal muscle tone, muscle strength upper-both lower extremities  Deep tendon reflexes 2+ and symmetric  Gait stable     Assessment & Plan   Pain History  68-year-old man with history of lower back pain  Onset of symptoms 10 years ago no particular injury associated with the pain onset  Pain have progressively been worsening  Describes it as dull aching pain sensation  No radiation of pain in leg  No associated numbness or paresthesia  No changes in bladder or bowel control  No history of fever chills or weight loss     Awaiting factors include lifting bending twisting turning and daily work which is very physical work for him  Alleviating factors include rest     Simply completed physical therapy without any improvement  tried NSAIDs without any relief  Have a recent MRI lumbar spine available in epic  Recall some back injection 10 years ago  No previous back surgical history  No spine injection first and years  Pain is progressively worsening and affecting daily life activities  Pain score today  5     EXAMINATION: MRI OF THE LUMBAR SPINE WITHOUT CONTRAST, 8/22/2020 11:42 am    L4-L5: Moderate degenerative disc changes along the right side of the endplates with Modic type 1 endplate marrow signal change and a small Schmorl's node. Disc bulge causes mild right foraminal stenosis. 1. DDD (degenerative disc disease), lumbar    2.  Chronic right-sided low back pain without sciatica        Lumbar spine MRI   Report was reviewed  Images were reviewed independently  Images were shown to the patient  Pertinent finding include L4-L5 level degenerative disc changes with inferior endplate of L4 showing Modic type I vertebral changes suggesting ongoing vertebral body pain  Also shows disc bulge at that level     Patient failed conservative measures pain is severe and affecting daily life activity  I will recommend for lumbar epidural steroid injection  We will start on Mobic and Zanaflex  If this fails then consider for basivertebral nerve ablation     Alteration note sent to the referring physician      Orders Placed This Encounter   Procedures    [place] INJECT ANES/STEROID FORAMEN LUMBAR/SACRAL W IMG GUIDE ,1 LEVEL      Encounter Medications         Orders Placed This Encounter Medications    meloxicam (MOBIC) 15 MG tablet       Sig: Take 1 tablet by mouth daily       Dispense:  30 tablet       Refill:  0    tiZANidine (ZANAFLEX) 4 MG tablet       Sig: Take 1 tablet by mouth nightly       Dispense:  30 tablet       Refill:  0               Electronically signed by Stewart Siddiqi MD on 10/20/2020 at 1:05 PM               Revision History

## 2020-12-02 ENCOUNTER — TELEPHONE (OUTPATIENT)
Dept: PAIN MANAGEMENT | Age: 60
End: 2020-12-02

## 2020-12-02 ENCOUNTER — OFFICE VISIT (OUTPATIENT)
Dept: PAIN MANAGEMENT | Age: 60
End: 2020-12-02
Payer: COMMERCIAL

## 2020-12-02 VITALS
HEART RATE: 53 BPM | HEIGHT: 71 IN | DIASTOLIC BLOOD PRESSURE: 89 MMHG | SYSTOLIC BLOOD PRESSURE: 137 MMHG | TEMPERATURE: 96.9 F | BODY MASS INDEX: 25.34 KG/M2 | WEIGHT: 181 LBS

## 2020-12-02 PROCEDURE — 99213 OFFICE O/P EST LOW 20 MIN: CPT | Performed by: ANESTHESIOLOGY

## 2020-12-02 ASSESSMENT — ENCOUNTER SYMPTOMS
RESPIRATORY NEGATIVE: 1
ALLERGIC/IMMUNOLOGIC NEGATIVE: 1
EYES NEGATIVE: 1
BACK PAIN: 1

## 2020-12-02 NOTE — PROGRESS NOTES
The patient is a 64 y. o. Non-/non  male. Chief Complaint   Patient presents with    Follow-up     11/16/20 Epidural Steroid Injection Right L4 & L5    Back Pain     Right Lumbar area         HPI       Chronic back pain  Onset many years ago  No particular injury associated with the onset of pain  Symptoms are progressively worsening  Describes the pain location in the lumbosacral area affecting both side right more than left describes it as aching stabbing pain sensation  No radiation of pain in leg  No associated dermatomal numbness or paresthesia  Patient of tried conservative measures with NSAIDs physical therapy  He had epidural injection with no improvement in his pain  Pain is interfering with his quality of life  Aggravates with routine activities range between 2-6 over 10  Have a recent MRI lumbar spine in August 2020 that showed Modic changes in L4 and L5 lumbar vertebrae    S/P: Epidural Steroid Injection Right L4 & L5       Outcome   Any improvement of activity?   No   Any side effects (appetite,leg cramping,facial fleshing): No   Increase of pain:  No  Pain score Today:  2  % of pain relief: 10%  Pain diary (medial branch block): No    No results found for: LABA1C  No results found for: EAG          Past Medical History:   Diagnosis Date    Arthritis     in hip    Asthma     GERD (gastroesophageal reflux disease)       Past Surgical History:   Procedure Laterality Date    COLONOSCOPY  04/06/2018    polyp removed    COLONOSCOPY N/A 4/6/2018    COLONOSCOPY POLYPECTOMY SNARE/COLD BIOPSY performed by El Cardenas MD at [facility] 1772 Right 11/16/2020    RIGHT L4 AND L5 EPIDURAL STEROID INJECTION performed by Ovidio Hendrix MD at [facility] 169 History     Socioeconomic History    Marital status:      Spouse name: None    Number of children: None    Years of education: None    Highest education level: None   Occupational History    None   Social Needs    Financial resource strain: None    Food insecurity     Worry: None     Inability: None    Transportation needs     Medical: None     Non-medical: None   Tobacco Use    Smoking status: Former Smoker     Years: 10.00     Types: Cigars    Smokeless tobacco: Never Used    Tobacco comment: patient states 4 cigars a year    Substance and Sexual Activity    Alcohol use: Yes     Comment: socially    Drug use: No    Sexual activity: Yes   Lifestyle    Physical activity     Days per week: None     Minutes per session: None    Stress: None   Relationships    Social connections     Talks on phone: None     Gets together: None     Attends Druze service: None     Active member of club or organization: None     Attends meetings of clubs or organizations: None     Relationship status: None    Intimate partner violence     Fear of current or ex partner: None     Emotionally abused: None     Physically abused: None     Forced sexual activity: None   Other Topics Concern    None   Social History Narrative    None     Family History   Problem Relation Age of Onset    No Known Problems Mother     Diabetes Father      Allergies   Allergen Reactions    Ciprofloxacin Dermatitis     Ciprofloxacin   Vitals:    12/02/20 0946   BP: 137/89   Pulse: 53   Temp: 96.9 °F (36.1 °C)     Current Outpatient Medications   Medication Sig Dispense Refill    meloxicam (MOBIC) 15 MG tablet Take 1 tablet by mouth daily 30 tablet 0    beclomethasone (QVAR REDIHALER) 40 MCG/ACT AERB inhaler Inhale 1 puff into the lungs 2 times daily 1 Inhaler 3    omeprazole (PRILOSEC) 20 MG delayed release capsule TAKE 1 CAPSULE BY MOUTH 2 TIMES A  capsule 1    cromolyn (NASALCROM) 5.2 MG/ACT nasal spray 1 spray by Nasal route 3 times daily 1 Bottle 3    mometasone (ASMANEX, 60 METERED DOSES,) 220 MCG/INH inhaler Inhale 1 puff into the lungs 2 times daily 1 Inhaler 11    montelukast (SINGULAIR) 10 MG tablet Take 1 tablet by mouth nightly 30 tablet 11    albuterol sulfate HFA (PROAIR HFA) 108 (90 Base) MCG/ACT inhaler Inhale 2 puffs into the lungs every 6 hours as needed for Wheezing 1 Inhaler 3    SUMAtriptan (IMITREX) 100 MG tablet TAKE 1 TABLET BY MOUTH ONCE AS NEEDED FOR MIGRAINE 9 tablet 0     No current facility-administered medications for this visit. Review of Systems   Constitutional: Negative. Negative for fever. HENT: Negative. Eyes: Negative. Respiratory: Negative. Cardiovascular: Negative. Endocrine: Negative. Genitourinary: Negative. Musculoskeletal: Positive for back pain. Skin: Negative. Allergic/Immunologic: Negative. Neurological: Negative. Hematological: Negative. Psychiatric/Behavioral: Negative. Objective:  General Appearance:  Uncomfortable and in pain. Vital signs: (most recent): Blood pressure 137/89, pulse 53, temperature 96.9 °F (36.1 °C), temperature source Temporal, height 5' 11\" (1.803 m), weight 181 lb (82.1 kg). Vital signs are normal.  No fever. Output: Producing urine and producing stool. HEENT: Normal HEENT exam.    Lungs:  Normal effort and normal respiratory rate. Breath sounds clear to auscultation. He is not in respiratory distress. Heart: Normal rate. Extremities: Normal range of motion. There is no deformity. Neurological: Patient is alert and oriented to person, place and time. Patient has normal coordination. Pupils:  Pupils are equal, round, and reactive to light. Pupils are equal.   Skin:  Warm and dry. No rash or cyanosis. Assessment & Plan     EXAMINATION: MRI OF THE LUMBAR SPINE WITHOUT CONTRAST, 8/22/2020 11:42 am    L4-L5: Moderate degenerative disc changes along the right side of the endplates with Modic type 1 endplate marrow signal change and a small Schmorl's node. Disc bulge causes mild right foraminal stenosis.      1. DDD (degenerative disc disease), lumbar

## 2020-12-04 ENCOUNTER — VIRTUAL VISIT (OUTPATIENT)
Dept: PULMONOLOGY | Age: 60
End: 2020-12-04
Payer: COMMERCIAL

## 2020-12-04 PROCEDURE — 99213 OFFICE O/P EST LOW 20 MIN: CPT | Performed by: INTERNAL MEDICINE

## 2020-12-04 RX ORDER — ALBUTEROL SULFATE 90 UG/1
2 AEROSOL, METERED RESPIRATORY (INHALATION) 4 TIMES DAILY PRN
Qty: 3 INHALER | Refills: 3 | Status: SHIPPED | OUTPATIENT
Start: 2020-12-04 | End: 2021-02-09 | Stop reason: SDUPTHER

## 2020-12-04 RX ORDER — PREDNISONE 10 MG/1
40 TABLET ORAL DAILY
Qty: 20 TABLET | Refills: 0 | Status: SHIPPED | OUTPATIENT
Start: 2020-12-04 | End: 2020-12-09

## 2020-12-04 RX ORDER — MONTELUKAST SODIUM 10 MG/1
10 TABLET ORAL NIGHTLY
Qty: 90 TABLET | Refills: 3 | Status: SHIPPED | OUTPATIENT
Start: 2020-12-04 | End: 2021-06-04 | Stop reason: SDUPTHER

## 2020-12-04 ASSESSMENT — SLEEP AND FATIGUE QUESTIONNAIRES
HOW LIKELY ARE YOU TO NOD OFF OR FALL ASLEEP WHILE SITTING QUIETLY AFTER LUNCH WITHOUT ALCOHOL: 0
HOW LIKELY ARE YOU TO NOD OFF OR FALL ASLEEP WHILE SITTING AND TALKING TO SOMEONE: 0
HOW LIKELY ARE YOU TO NOD OFF OR FALL ASLEEP IN A CAR, WHILE STOPPED FOR A FEW MINUTES IN TRAFFIC: 0
HOW LIKELY ARE YOU TO NOD OFF OR FALL ASLEEP WHILE LYING DOWN TO REST IN THE AFTERNOON WHEN CIRCUMSTANCES PERMIT: 1
ESS TOTAL SCORE: 3
HOW LIKELY ARE YOU TO NOD OFF OR FALL ASLEEP WHILE SITTING AND READING: 0
HOW LIKELY ARE YOU TO NOD OFF OR FALL ASLEEP WHILE WATCHING TV: 2
HOW LIKELY ARE YOU TO NOD OFF OR FALL ASLEEP WHEN YOU ARE A PASSENGER IN A CAR FOR AN HOUR WITHOUT A BREAK: 0
HOW LIKELY ARE YOU TO NOD OFF OR FALL ASLEEP WHILE SITTING INACTIVE IN A PUBLIC PLACE: 0

## 2020-12-04 ASSESSMENT — ENCOUNTER SYMPTOMS
WHEEZING: 1
EYES NEGATIVE: 1
SHORTNESS OF BREATH: 1

## 2020-12-04 NOTE — PROGRESS NOTES
2020    TELEHEALTH EVALUATION -- Audio/Visual (During JCAPY-28 public health emergency)    Patient and physician are located in their individual locations. This is visit is completed via [employer] application []/ Doxy. me[x] / Telephone []     HPI:    Zacarias Young (:  1960) has requested an audio/video evaluation for the following concern(s):    Follow-up visit for asthma. Since his last visit over a year ago his asthma has generally been under good control. Continues to report cough which he believes is a separate issue from asthma. A few weeks ago he developed an upper respiratory tract infection. Not Covid. Since then his asthma has been under poor control. He has been using his albuterol inhaler about 4-6 times per day. Also on Asmanex. He started using Advair which she states is helped a little bit. Also, he received a Medrol dose pack for an arthritic condition of his foot. He ran a race while taking the Medrol and actually states \"my breathing has never been so good. \"  He continues to report wheeze, chest tightness, and shortness of breath. Denies nocturnal symptoms. Received his flu shot      Review of Systems   Constitutional: Negative. HENT: Negative. Eyes: Negative. Respiratory: Positive for shortness of breath and wheezing. Cardiovascular: Negative. All other systems reviewed and are negative. Prior to Visit Medications    Medication Sig Taking?  Authorizing Provider   fluticasone-salmeterol (ADVAIR DISKUS) 250-50 MCG/DOSE AEPB Inhale 1 puff into the lungs every 12 hours Yes Shady Cox, DO   albuterol sulfate HFA (VENTOLIN HFA) 108 (90 Base) MCG/ACT inhaler Inhale 2 puffs into the lungs 4 times daily as needed for Wheezing Yes Mango Cox, DO   predniSONE (DELTASONE) 10 MG tablet Take 4 tablets by mouth daily for 5 days Yes Mango Cox, DO   montelukast (SINGULAIR) 10 MG tablet Take 1 tablet by mouth nightly Yes Rosalind Ahmadi, DO   omeprazole (PRILOSEC) 20 MG delayed release capsule TAKE 1 CAPSULE BY MOUTH 2 TIMES A DAY Yes Yo Roa MD   SUMAtriptan (IMITREX) 100 MG tablet TAKE 1 TABLET BY MOUTH ONCE AS NEEDED FOR MIGRAINE Yes Yo Roa MD   cromolyn (NASALCROM) 5.2 MG/ACT nasal spray 1 spray by Nasal route 3 times daily Yes Shady Cox DO   albuterol sulfate HFA (PROAIR HFA) 108 (90 Base) MCG/ACT inhaler Inhale 2 puffs into the lungs every 6 hours as needed for Wheezing Yes Yo Roa MD   meloxicam (MOBIC) 15 MG tablet Take 1 tablet by mouth daily  Patient not taking: Reported on 12/4/2020  Mark Ngo MD   beclomethasone (QVAR REDIHALER) 40 MCG/ACT AERB inhaler Inhale 1 puff into the lungs 2 times daily  Patient not taking: Reported on 12/4/2020  Yo Roa MD       Social History     Tobacco Use    Smoking status: Former Smoker     Years: 10.00     Types: Cigars    Smokeless tobacco: Never Used    Tobacco comment: patient states 4 cigars a year    Substance Use Topics    Alcohol use: Yes     Comment: socially    Drug use: No            RECORD REVIEW: Previous medical records were reviewed at today's visit. Wt Readings from Last 3 Encounters:   12/02/20 181 lb (82.1 kg)   11/16/20 175 lb (79.4 kg)   10/20/20 178 lb (80.7 kg)       Results for orders placed or performed in visit on 07/29/20   Be Well Health Screen   Result Value Ref Range    Glucose 89 70 - 99 mg/dL    Cholesterol 199 <200 mg/dL    HDL 46 >40 mg/dL    LDL Cholesterol 133 (H) 0 - 130 mg/dL    Chol/HDL Ratio 4.3 <5    Triglycerides 98 <150 mg/dL    VLDL NOT REPORTED 1 - 30 mg/dL    Patient Fasting? YES        Fluoro For Surgical Procedures    Result Date: 11/16/2020  Radiology exam is complete. No Radiologist dictation. Please follow up with ordering provider.        PHYSICAL EXAMINATION:  Due to this being a TeleHealth encounter, evaluation of the following organ systems is limited: Vitals/Constitutional/EENT/Resp/CV/GI//MS/Neuro/Skin/Heme-Lymph-Imm. Constitutional: [x] Appears well-developed and well-nourished. [] Abnormal  Mental status  [x] Alert and awake  [x] Oriented to person/place/time [x]Able to follow commands    [x] No apparent distress      Eyes:  EOM    [x]  Normal  [] Abnormal-  Sclera  [x]  Normal  [] Abnormal -         Discharge [x]  None visible  [] Abnormal -    HENT:   [x] Normocephalic, atraumatic. [] Abnormal shaped head   [x] Mouth/Throat: Mucous membranes are moist.     Ears [x] Normal  [] Abnormal-    Neck: [x] Normal range of motion [x] Supple [x] No visualized mass. Pulmonary/Chest: [x] Respiratory effort normal.  [x] No visualized signs of difficulty breathing or respiratory distress        [] Abnormal      Musculoskeletal:   [x] Normal range of motion. [x] Normal gait with no signs of ataxia. [x]  No signs of cyanosis of the peripheral portions of extremities. [] Abnormal       Neurological:        [x] Normal cranial nerve (limited exam to video visit) [x] No focal weakness observed       [] Abnormal          Speech       [x] Normal   [] Abnormal     Skin:        [x] No rash on visible skin  [x] Normal  [] Abnormal     Psychiatric:       [x] Normal  [] Abnormal        [x] Normal Mood  [] Anxious appearing        Other Pertinent Exam Findings:       ASSESSMENT:  1. Asthma, severe persistent, poorly-controlled, with acute exacerbation    2. Chronic cough    3. Gastroesophageal reflux disease, unspecified whether esophagitis present          Plan:   1. Escalate asthma treatment. Discontinue Asmanex and substitute Advair 250/50, montelukast 10 mg, and Ventolin HFA as needed. 2. Discussed strategies for management of asthma including escalation and de-escalation of therapy. 3. Prednisone 40 mg daily for 5 days. 4. Cough likely related to asthma. 5. Discussed biologic therapy.   Consider serum eosinophil testing if asthma continues to be difficult to control. 6. Return in the spring. 7. Discussed strategies to mitigate risk of COVID-19. An  electronic signature was used to authenticate this note. --Reginald Vann DO on 12/4/2020 at 2:33 PM    9}    Pursuant to the emergency declaration under the [address] [place] [address], [facility] waiver authority and the [employer] Act, this Virtual  Visit was conducted, with patient's consent, to reduce the patient's risk of exposure to COVID-19 and provide continuity of care for an established patient. Services were provided through a video synchronous discussion virtually to substitute for in-person clinic visit.

## 2020-12-11 ENCOUNTER — TELEPHONE (OUTPATIENT)
Dept: PAIN MANAGEMENT | Age: 60
End: 2020-12-11

## 2020-12-21 ENCOUNTER — PATIENT MESSAGE (OUTPATIENT)
Dept: FAMILY MEDICINE CLINIC | Age: 60
End: 2020-12-21

## 2020-12-21 NOTE — TELEPHONE ENCOUNTER
From: Suma Pepper  To: Kai Colby MD  Sent: 12/21/2020 6:17 AM EST  Subject: Prescription Question    Good morning , just wondering if you could write me a script for physical therapy? My knee started hurting a couple of weeks ago when I was running and I thought it was better but then I ran again yesterday and it started to hurt again.     Thank you,  Alfonso Knight

## 2020-12-22 NOTE — TELEPHONE ENCOUNTER
From: Ray Casillas  To: Gavi Ramos MD  Sent: 12/21/2020 5:39 PM EST  Subject: Prescription Question    [facility] [address] [facility] Also, Janice Martin at [address] [facility].      ----- Message -----   Dwayne Caballero MD   Sent:12/21/2020 12:57 PM EST   To:Isai Kruse   Subject:RE: Prescription Question    Please let me know where you want to be referred to. Thank you.      ----- Message -----   From:Isai Kruse   Sent:12/21/2020 6:17 AM EST   Stephanie Dixon MD   Subject:Prescription Question    Good morning , just wondering if you could write me a script for physical therapy? My knee started hurting a couple of weeks ago when I was running and I thought it was better but then I ran again yesterday and it started to hurt again.     Thank you,  Aime Muñiz

## 2020-12-23 ENCOUNTER — HOSPITAL ENCOUNTER (OUTPATIENT)
Dept: PHYSICAL THERAPY | Facility: CLINIC | Age: 60
Setting detail: THERAPIES SERIES
Discharge: HOME | End: 2020-12-23
Payer: COMMERCIAL

## 2020-12-23 PROCEDURE — 97016 VASOPNEUMATIC DEVICE THERAPY: CPT

## 2020-12-23 PROCEDURE — 97161 PT EVAL LOW COMPLEX 20 MIN: CPT

## 2020-12-23 PROCEDURE — 97110 THERAPEUTIC EXERCISES: CPT

## 2020-12-23 PROCEDURE — 97140 MANUAL THERAPY 1/> REGIONS: CPT

## 2020-12-23 NOTE — CONSULTS
[x] [facility]  Outpatient Rehabilitation &  Therapy  [facility]   [place]: [phone]  F: [phone]      Physical Therapy Running Evaluation    Date:  2020  Patient: Cyndee Biswas   : 1960  MRN: [id number]  Physician: Dr. Chio Locke: [employer] (Un[employer])  Medical Diagnosis: B knee pain                   Rehab Codes: M25.561, M25.562  Onset date: 20                             Next 's appt.: prn    Subjective:   CC:Pt reports he was on a 6 mile run and the knee hurt suddenly the last 1/2 mile. Then again at 3.5 miles of a 4 mile run     PMHx: [] Unremarkable [] Diabetes [] HTN  [] Pacemaker   [] MI/Heart Problems [] Cancer [] Arthritis  [] Other:              [x] Refer to full medical chart  In EPIC     Tests: [] X-Ray: [] MRI:  [x] none:     Medications: [x] Refer to full medical record [] None [] Other:  Allergies:      [x] Refer to full medical record [] None [] Other:    Working:  [x] Normal Duty  [] Light Duty  [] Off D/T Condition  [] Retired    [] Not Employed    []  Disability  [] Other:           Return to work:     Job/ADL Description:  [address]- Sitting in car travelling    Pain:  [x] Yes  [] No   Location:  L knee/Quad Pain Ratin/10(0-10 scale) 8/10 worst    Pain altered Tx:  [x] Yes  [] No  Action:No running  Symptoms:  [x] Improving [] Worsening [] Same  Better:  [] Meds    [] Ice pack    [] Sit    [x] Not running  []Stand    [] Walk    [] Stretching   [] Other:  Worse: [x] Run    [] Easy    [] Speed work    []Stand    [] Walk    [] Stairs    [] Sit    [] Other:  Sleep: [x] OK    [] Disturbed    Objective:    ROM  ° A/P STRENGTH TESTS (+/-) Left Right Not Tested    Left Right Left Right Ant.  Drawer -  []   Hip Flex WNL WNL   Post. Drawer   []   Ext 20 WNL 4 5 Lachmans   []   ER WNL WNL 5 5 Valgus Stress -  []   IR WNL WNL   Varus Stress -  []   ABD WNL WNL 4+ 5 Rickys -  []   ADD     Pat-Fem Grind +  [] Knee Flex WNL WNL   FADIRs   []   Ext WNL WNL 4 pain provoked  Hip Scouring   []   Ankle DF     FAIZAs   []   PF     Hop test + quad pain  []   INV     Fulcrum test -  []   EVER     Isai   +  []   GTE     John's   []       OBSERVATION No Deficit Deficit Not Tested Comments   Posture       Forward Head [] [x] []    Rounded Shoulders [] [x] []    Kyphosis [] [] []    Lordosis [] [] []    Scoliosis [] [] []    Iliac Crest [x] [] []    PSIS [] [] []    ASIS [] [] []    Genu Valgus [] [] []    Genu Varus [] [] []    Genu Recurvatum [] [] []    Pronation [] [] []    Supination [] [] []    Leg Length Discrp [] [] []    Slumped Sitting [] [] []    Palpation [] [x] [] L LE spasm: Vastus medialis Vastus intermedius, Hip flexor, TFL, piriformis, Glute med    Sensation [x] [] []    Gait [x] [] [] Analysis:Normal          FUNCTIONAL TESTS PAIN NO PAIN COMMENTS   1 legged squat [x] [] Unable to perform due to pain       Functional Test: UWRI Score: 56 % functionally impaired     Comments:  Assessment:Patient would benefit from skilled physical therapy services in order to: Increase hip extension ROM, hip strength, decrease quad spasm    Problems:    [x] ? Pain:     [x] ? ROM:    [x] ? Strength:    [x] ? Function: Not able to run as he wishes   [] ? Balance  [] Increased edema:  [] Postural Deviations  [] Gait Deviations  [x]  UWRI   [] Other:      STG: (to be met in 10 treatments)  1. ? Pain:<3/10 L knee and quad pain to allow pt to begin running again  2. ? ROM:Normal hip extension ROM to allow for improved knee stability  3. ? Function: Able to run 3 miles with <3/10 pain L quad  4. Independent with Home Exercise Programs    LTG: (to be met in 20 treatments)  1. No pain L knee and quad  2. 5/5 hip strength to improve LE stability and able to perform impact lunge with good landing mechanics, no genu valgus  3. UWRI score to 34/34  4.  Able to run as he wishes                   Patient goals: Resolve pain Rehab Potential:  [x] Good  [] Fair  [] Poor   Suggested Professional Referral:  [x] No  [] Yes:  Barriers to Goal Achievement[de-identified]  [x] No  [] Yes:  Domestic Concerns:  [x] No  [] Yes:    Pt. Education:  [x] Plans/Goals, Risks/Benefits discussed  [x] Home exercise program    Method of Education: [] Verbal  [] Demo  [] Written  Comprehension of Education:  [] Verbalizes understanding. [] Demonstrates understanding. [] Needs Review. [] Demonstrates/verbalizes understanding of HEP/Ed previously given.     Treatment Plan:  [x] Therapeutic Exercise    [x] Therapeutic Activity  [x] Manual Therapy   [x] Alter G treadmill  [x] Phys perf test     [x] Vasocompression/Game Ready   [x] Neuromuscular Re-education [x] Instruction in HEP                                Frequency:  1-2x/week for 20 visits    Todays Treatment:  Modalities: Vasocompression 34 deg medium pressure x 15 min L quad  Manual: DI proximal and distal hip flexor, MWM L hip anterior glide, Hawkgrip and hypervolt L quad  Precautions: standard  Exercises:  Exercise Reps/ Time Weight/ Level Issued for HEP  MOBO Y/N Comments   Prone         L quad strap stretch 3x30\"  x x     Flying squirrels         Hip ext (glut max)         [place] hip ext         Supine         2 legged bridges x30  x x  NO UE support   1 legged bridges         PB hamstring curls         Bridge marches         Sidelying         Clams 90/30 deg         Angelica hip abd         Quadruped         Donkey kicks         [place] twist         Gym         1/2 kneel hip flexor stretch 2'ea  x x     Tippy bird         Monster walks         Hip thrusts         Step downs         Other:    Specific Instructions for next treatment: Recheck ROM, progress hip and quad strengthening as able    Treatment Charges: Mins Units   [x] Evaluation       [x]  Low       []  Moderate       []  High 15 1   [] Phys perf test     [x]  Ther Exercise 10 1   [x]  Manual Therapy 20 1   []  Ther Activities     []  Aquatics [x]  Vasocompression 15 1   []  NMR       TOTAL TREATMENT TIME: 60    Time in: 1500  Time Out:1600    Electronically signed by: Rebeca Escobar PT        Physician Signature:________________________________Date:__________________  By signing above or cosigning this note, I have reviewed this plan of care and certify a need for medically necessary rehabilitation services.      *PLEASE SIGN ABOVE AND FAX BACK ALL PAGES*

## 2020-12-28 ENCOUNTER — HOSPITAL ENCOUNTER (OUTPATIENT)
Dept: PHYSICAL THERAPY | Facility: CLINIC | Age: 60
Setting detail: THERAPIES SERIES
Discharge: HOME | End: 2020-12-28
Payer: COMMERCIAL

## 2020-12-28 PROCEDURE — 97140 MANUAL THERAPY 1/> REGIONS: CPT

## 2020-12-28 PROCEDURE — 97016 VASOPNEUMATIC DEVICE THERAPY: CPT

## 2020-12-28 PROCEDURE — 97110 THERAPEUTIC EXERCISES: CPT

## 2020-12-28 NOTE — FLOWSHEET NOTE
[x] [facility]  Outpatient Rehabilitation &  Therapy  [facility]   [place]: [phone]  F: [phone]      Physical Therapy Daily Treatment Note    Date:  2020  Patient Name:  Rachelle Mccrary    :  1960  MRN: [id number]  Physician: Monico Weems  Insurance: MMO (Unlimited)  Medical Diagnosis: B knee pain                   Rehab Codes: M25.561, M25.562  Onset date: 20                             Next Dr's appt.: prn  Visit# / total visits:      Cancels/No Shows: 0    Subjective:    Pain:  [x] Yes  [] No Location: L knee and quad Pain Rating: (0-10 scale) 0-5/10  Pain altered Tx:  [x] No  [] Yes  Action:  Comments:Pt reports the quad and knee are feeling better. Only had pain with shoveling snow and slipped a little catching myself and when I twisted a little funny.   Objective:  Modalities: Vasocompression 34 deg medium pressure x 15 min L quad  Manual: DI proximal and distal hip flexor, MWM L hip anterior glide, Hawkgrip and hypervolt L quad  Precautions: standard  Exercises:  Exercise Reps/ Time Weight/ Level Issued for HEP   MOBO Y/N Comments   Prone               L quad strap stretch 3x30\"   x x       Flying squirrels  2x10      x       Hip ext (glut max) 2x10      x       [place] hip ext               Supine               2 legged bridges x30   x    NO UE support   1 legged bridges               PB hamstring curls               Bridge marches               Sidelying               Clams 90/30 deg               Angelica hip abd               Quadruped               Donkey kicks               [employer]               Gym               1/2 kneel hip flexor stretch 2'ea   x x       Tippy bird  x20      x       Monster walks  1 lap  blue    x    feet   Hip thrusts x30      x       Step downs               Other:     Specific Instructions for next treatment: Quad strengthening if able        Treatment Charges: Mins Units   []  Modalities [x]  Ther Exercise 30 2   [x]  Manual Therapy 15 1   []  Ther Activities     []  Aquatics     [x]  Vasocompression 15 1   []  Other     Total Treatment time 60        Assessment: [x] Progressing toward goals. Pt is doing better with regard to hip extension but was still limited 5 degrees and had hip abduction upon extension of hip . Glute max was inhibited on L had to use 3 pillows at abdomen to not get hamstring and erector spinae activation. Mild pain with tippy bird at L quad. Foot was noted to be tight with WB to lateral column of foot use lax ball to loosen foot and balance was improved with pt WB across entire foot instead of staying out to lateral portion of foot. [] Other:  [x] Patient would continue to benefit from skilled physical therapy services in order to: improve hip strength and quad strength as well as hip extension ROM    STG: (to be met in 10 treatments)  1. ? Pain:<3/10 L knee and quad pain to allow pt to begin running again  2. ? ROM:Normal hip extension ROM to allow for improved knee stability  3. ? Function: Able to run 3 miles with <3/10 pain L quad  4. Independent with Home Exercise Programs     LTG: (to be met in 20 treatments)  1. No pain L knee and quad  2. 5/5 hip strength to improve LE stability and able to perform impact lunge with good landing mechanics, no genu valgus  3. UWRI score to 34/34  4. Able to run as he wishes                    Patient goals: Resolve pain    Pt. Education:  [] Yes  [] No  [] Reviewed Prior HEP/Ed  Method of Education: [] Verbal  [] Demo  [] Written  Comprehension of Education:  [] Verbalizes understanding. [] Demonstrates understanding. [] Needs review. [] Demonstrates/verbalizes HEP/Ed previously given. Plan: [x] Continue current frequency toward long and short term goals.     [] Specific Instructions for subsequent treatments:       Time In:1500            Time Out: 1600    Electronically signed by:  Pamela Pearson PT

## 2020-12-29 ENCOUNTER — PATIENT MESSAGE (OUTPATIENT)
Dept: PULMONOLOGY | Age: 60
End: 2020-12-29

## 2021-01-04 ENCOUNTER — HOSPITAL ENCOUNTER (OUTPATIENT)
Dept: PHYSICAL THERAPY | Facility: CLINIC | Age: 61
Setting detail: THERAPIES SERIES
Discharge: HOME | End: 2021-01-04
Payer: COMMERCIAL

## 2021-01-04 PROCEDURE — 97110 THERAPEUTIC EXERCISES: CPT

## 2021-01-04 PROCEDURE — 97140 MANUAL THERAPY 1/> REGIONS: CPT

## 2021-01-04 PROCEDURE — 97016 VASOPNEUMATIC DEVICE THERAPY: CPT

## 2021-01-04 NOTE — FLOWSHEET NOTE
[x] [facility]  Outpatient Rehabilitation &  Therapy  [facility]   [place]: [phone]  F: [phone]      Physical Therapy Daily Treatment Note    Date:  2021  Patient Name:  Man Pablo    :  1960  MRN: [id number]  Physician: Delma Franco  Insurance: MMO (Unlimited)  Medical Diagnosis: B knee pain                   Rehab Codes: M25.561, M25.562  Onset date: 20                             Next Dr's appt.: prn  Visit# / total visits: 3/20     Cancels/No Shows: 0    Subjective:    Pain:  [x] Yes  [] No Location: L knee and quad Pain Rating: (0-10 scale) 0/10  Pain altered Tx:  [x] No  [] Yes  Action:  Comments:Pt reports he was able to run 1.25 miles and the most pain he felt was a 1/10 down at the medial knee B.  I then was going downstairs wit laundry and slipped and caugt myself with   Objective:  Modalities: Vasocompression 34 deg medium pressure x 15 min L quad  Manual: DI proximal and distal hip flexor,hypervolt L quad  Precautions: standard  Exercises:  Exercise Reps/ Time Weight/ Level Issued for HEP   MOBO Y/N Comments   Prone               L quad strap stretch 3x30\"   x x       Flying squirrels  2x10      x       Hip ext (glut max) 2x10      x        hip Abd  2x10      x       Supine               2 legged bridges x30   x    NO UE support   1 legged bridges               PB hamstring curls               Bridge march               Sidelying               Clams 90/30 deg               Angelica hip abd               Quadruped               Donkey kicks               Canadian Stemgent               Gym               1/2 kneel hip flexor stretch 2'ea   x x       Hip circles  Lime  x     Tippy bird  2x10  5#    x       Monster walks  2 lap  blue    x    feet   Hip thrusts x30      x       Split squat isotonic  2x10      x    Lead w/L only   Other:     Specific Instructions for next treatment: Continue to strengthen glutes Treatment Charges: Mins Units   []  Modalities     [x]  Ther Exercise 35 2   [x]  Manual Therapy 15 1   []  Ther Activities     []  Aquatics     [x]  Vasocompression 15 1   []  Other     Total Treatment time 65 4       Assessment: [x] Progressing toward goals. Pt has decreased palpable spasm L quad. Added split squat today for quad strengthening. Mild pain and unable to perform with L LE being the trail leg due to increased pain. Pt is to run later this afternoon. If pain escalates >3/10 he is to stop. [] Other:  [x] Patient would continue to benefit from skilled physical therapy services in order to: improve hip strength and quad strength as well as hip extension ROM    STG: (to be met in 10 treatments)  1. ? Pain:<3/10 L knee and quad pain to allow pt to begin running again  2. ? ROM:Normal hip extension ROM to allow for improved knee stability  3. ? Function: Able to run 3 miles with <3/10 pain L quad  4. Independent with Home Exercise Programs     LTG: (to be met in 20 treatments)  1. No pain L knee and quad  2. 5/5 hip strength to improve LE stability and able to perform impact lunge with good landing mechanics, no genu valgus  3. UWRI score to 34/34  4. Able to run as he wishes                    Patient goals: Resolve pain    Pt. Education:  [x] Yes  [] No  [] Reviewed Prior HEP/Ed  Method of Education: [x] Verbal  [x] Demo  [] Written- Add split squat lead with L  Comprehension of Education:  [] Verbalizes understanding. [] Demonstrates understanding. [] Needs review. [] Demonstrates/verbalizes HEP/Ed previously given. Plan: [x] Continue current frequency toward long and short term goals.     [] Specific Instructions for subsequent treatments:       Time In:0800            Time Out: 0121    Electronically signed by:  Taylor Wray PT

## 2021-01-06 ENCOUNTER — TELEPHONE (OUTPATIENT)
Dept: PAIN MANAGEMENT | Age: 61
End: 2021-01-06

## 2021-01-06 ENCOUNTER — HOSPITAL ENCOUNTER (OUTPATIENT)
Dept: PHYSICAL THERAPY | Facility: CLINIC | Age: 61
Setting detail: THERAPIES SERIES
Discharge: HOME | End: 2021-01-06
Payer: COMMERCIAL

## 2021-01-06 PROCEDURE — 97016 VASOPNEUMATIC DEVICE THERAPY: CPT

## 2021-01-06 PROCEDURE — 97110 THERAPEUTIC EXERCISES: CPT

## 2021-01-06 PROCEDURE — 97140 MANUAL THERAPY 1/> REGIONS: CPT

## 2021-01-06 NOTE — TELEPHONE ENCOUNTER
Patient called to see what was going on with his procedure and I called him back and left a message to let him know that we are waiting for the equipment to do the procedure and that it will be sometime in Feb mid month and if he had any questions to call me back

## 2021-01-06 NOTE — FLOWSHEET NOTE
[x] [facility]  Outpatient Rehabilitation &  Therapy  [facility]   [place]: [phone]  F: [phone]      Physical Therapy Daily Treatment Note    Date:  2021  Patient Name:  Vicki Abraham    :  1960  MRN: [id number]  Physician: Tomer Rojas  Insurance: MMO (Unlimited)  Medical Diagnosis: B knee pain                   Rehab Codes: M25.561, M25.562  Onset date: 20                             Next Dr's appt.: prn  Visit# / total visits: 3/20     Cancels/No Shows: 0    Subjective:    Pain:  [x] Yes  [] No Location: L knee and quad Pain Rating: (0-10 scale) 0/10  Pain altered Tx:  [x] No  [] Yes  Action:  Comments:Pt reports he was able to run 2 miles.  Still   Objective:  Modalities: Vasocompression 34 deg medium pressure x 15 min L quad  Manual: DI proximal and distal hip flexor,hypervolt L quad  Precautions: standard  Exercises:  Exercise Reps/ Time Weight/ Level Issued for HEP   MOBO Y/N Comments   Prone               L quad strap stretch 3x30\"   x x       Flying squirrels  2x10      x       Hip ext (glut max) 2x10      x        hip Abd  2x10      x       Supine               2 legged bridges x30   x    NO UE support   1 legged bridges               PB hamstring curls               Bridge marches               Sidelying               Clams 90/30 deg               Angelica hip abd               Over SB               Donkey kicks  2x10      x       Opp UE/LE  2x10      x       Gym               1/2 kneel hip flexor knee flexed stretch 2'ea   x x       Hip circles 2x10 Lime  x     Banded 45 kicks 2x10 line  x     Tippy bird  2x10  5#    x       Monster walks  2 lap  blue    x  fwd & lat ankles   Heel taps 2x10 4\"  x     Hip thrusts x30             Split squat isotonic  2x10      x    Lead w/L only   Other:     Specific Instructions for next treatment: Continue to strengthen glutes         Treatment Charges: Mins Units   []  Modalities     [x]  Ther Exercise 35 2 [x]  Manual Therapy 15 1   []  Ther Activities     []  Aquatics     [x]  Vasocompression 15 1   []  Other     Total Treatment time 65 4       Assessment: [x] Progressing toward goals. Less pain with split squat. Still fatigues quickly with Sidestepping monster walk. Able to perform heel taps without increase in pain. Still at 1-2/10 in quad OK to increase to 2.5 miles on tomorrow's run     [] Other:  [x] Patient would continue to benefit from skilled physical therapy services in order to: improve hip strength and quad strength as well as hip extension ROM    STG: (to be met in 10 treatments)  1. ? Pain:<3/10 L knee and quad pain to allow pt to begin running again  2. ? ROM:Normal hip extension ROM to allow for improved knee stability  3. ? Function: Able to run 3 miles with <3/10 pain L quad  4. Independent with Home Exercise Programs     LTG: (to be met in 20 treatments)  1. No pain L knee and quad  2. 5/5 hip strength to improve LE stability and able to perform impact lunge with good landing mechanics, no genu valgus  3. UWRI score to 34/34  4. Able to run as he wishes                    Patient goals: Resolve pain    Pt. Education:  [x] Yes  [] No  [] Reviewed Prior HEP/Ed  Method of Education: [x] Verbal  [x] Demo  [] Written- Add split squat lead with L  Comprehension of Education:  [] Verbalizes understanding. [] Demonstrates understanding. [] Needs review. [] Demonstrates/verbalizes HEP/Ed previously given. Plan: [x] Continue current frequency toward long and short term goals.     [] Specific Instructions for subsequent treatments:       Time In:1503            Time Out: 2143    Electronically signed by:  Zahida Mendez, PT

## 2021-01-11 ENCOUNTER — HOSPITAL ENCOUNTER (OUTPATIENT)
Dept: PHYSICAL THERAPY | Facility: CLINIC | Age: 61
Setting detail: THERAPIES SERIES
Discharge: HOME | End: 2021-01-11
Payer: COMMERCIAL

## 2021-01-11 PROCEDURE — 97016 VASOPNEUMATIC DEVICE THERAPY: CPT

## 2021-01-11 PROCEDURE — 97140 MANUAL THERAPY 1/> REGIONS: CPT

## 2021-01-11 PROCEDURE — 97110 THERAPEUTIC EXERCISES: CPT

## 2021-01-11 NOTE — FLOWSHEET NOTE
[x] [facility]  Outpatient Rehabilitation &  Therapy  [facility]   [place]: [phone]  F: [phone]      Physical Therapy Daily Treatment Note    Date:  2021  Patient Name:  Francisco Ruiz    :  1960  MRN: [id number]  Physician: Sonia Martell  Insurance: MMO (Unlimited)  Medical Diagnosis: B knee pain                   Rehab Codes: M25.561, M25.562  Onset date: 20                             Next Dr's appt.: prn  Visit# / total visits:      Cancels/No Shows: 0    Subjective:    Pain:  [x] Yes  [] No Location: L knee and quad Pain Rating: (0-10 scale) 0/10  Pain altered Tx:  [x] No  [] Yes  Action:  Comments:Pt reports he was able to run 3 miles Saturday and 4 in   Objective:  Modalities: Vasocompression 34 deg medium pressure x 15 min L quad  Manual: DI proximal and distal hip flexor,hypervolt L quad  Precautions: standard  Exercises:  Exercise Reps/ Time Weight/ Level Issued for HEP   MOBO Y/N Comments   Prone               L quad strap stretch 3x30\"   x x       Flying squirrels  2x10             Hip ext (glut max) 2x10              hip Abd  2x10             Supine               2 legged bridges x30   x    NO UE support   1 legged bridges               PB hamstring curls               Bridge marches               Sidelying               Clams 90/30 deg               Angelica hip abd               Over SB               Donkey kicks  2x10      x       Opp UE/LE  2x10      x       Hip abduction  2x10   x     Gym               1/2 kneel hip flexor knee flexed stretch 2'ea   x x       Hip circles 2x10 Lime  x     Banded 45 kicks 2x10 lime  x     Tippy bird  2x10  5#    x       Monster walks  2 lap  black    x   lat ankles   Heel taps 2x10 4\"  x     Hip thrusts x30      x       Split squat isotonic  2x10      x    Lead w/L only   Other:     Specific Instructions for next treatment: Continue to strengthen glutes         Treatment Charges: Mins Units []  Modalities     [x]  Ther Exercise 35 2   [x]  Manual Therapy 15 1   []  Ther Activities     []  Aquatics     [x]  Vasocompression 15 1   []  Other     Total Treatment time 65 4       Assessment: [x] Progressing toward goals. Improved hip extension. Some hip abduction with extension that resolved after hypervolt to TFL. Mild L patellofemoral pain with workout. Vasocompression to decrease symptoms post.     [] Other:  [x] Patient would continue to benefit from skilled physical therapy services in order to: improve hip strength and quad strength as well as hip extension ROM    STG: (to be met in 10 treatments)  1. ? Pain:<3/10 L knee and quad pain to allow pt to begin running again  2. ? ROM:Normal hip extension ROM to allow for improved knee stability  3. ? Function: Able to run 3 miles with <3/10 pain L quad  4. Independent with Home Exercise Programs     LTG: (to be met in 20 treatments)  1. No pain L knee and quad  2. 5/5 hip strength to improve LE stability and able to perform impact lunge with good landing mechanics, no genu valgus  3. UWRI score to 34/34  4. Able to run as he wishes                    Patient goals: Resolve pain    Pt. Education:  [x] Yes  [] No  [] Reviewed Prior HEP/Ed  Method of Education: [x] Verbal  [x] Demo  [] Written- Add split squat lead with L  Comprehension of Education:  [] Verbalizes understanding. [] Demonstrates understanding. [] Needs review. [] Demonstrates/verbalizes HEP/Ed previously given. Plan: [x] Continue current frequency toward long and short term goals.     [] Specific Instructions for subsequent treatments:       Time In:0700            Time Out: 4352    Electronically signed by:  Brandy Soto, PT

## 2021-01-13 ENCOUNTER — HOSPITAL ENCOUNTER (OUTPATIENT)
Dept: PHYSICAL THERAPY | Facility: CLINIC | Age: 61
Setting detail: THERAPIES SERIES
Discharge: HOME | End: 2021-01-13
Payer: COMMERCIAL

## 2021-01-13 ENCOUNTER — TELEPHONE (OUTPATIENT)
Dept: PAIN MANAGEMENT | Age: 61
End: 2021-01-13

## 2021-01-13 PROCEDURE — 97016 VASOPNEUMATIC DEVICE THERAPY: CPT

## 2021-01-13 PROCEDURE — 97110 THERAPEUTIC EXERCISES: CPT

## 2021-01-13 PROCEDURE — 97140 MANUAL THERAPY 1/> REGIONS: CPT

## 2021-01-13 NOTE — TELEPHONE ENCOUNTER
Patient called and was asking for something for his pain while we are waiting on his procedure that will take place next month

## 2021-01-13 NOTE — FLOWSHEET NOTE
[]  Modalities     [x]  Ther Exercise 35 2   [x]  Manual Therapy 15 1   []  Ther Activities     []  Aquatics     [x]  Vasocompression 15 1   []  Other     Total Treatment time 65 4       Assessment: [x] Progressing toward goals. Distal hip flexor was the spot of pain that patient reported with walking when he entered this date. This resolved after DI to hip flexor. Pt is performing exercises with greater ease and is more stable with single leg exercises. ABle to increase resistance on Tband as well. He is to run 4 miles today. [] Other:  [x] Patient would continue to benefit from skilled physical therapy services in order to: improve hip strength and quad strength as well as hip extension ROM    STG: (to be met in 10 treatments)  1. ? Pain:<3/10 L knee and quad pain to allow pt to begin running again  2. ? ROM:Normal hip extension ROM to allow for improved knee stability  3. ? Function: Able to run 3 miles with <3/10 pain L quad  4. Independent with Home Exercise Programs     LTG: (to be met in 20 treatments)  1. No pain L knee and quad  2. 5/5 hip strength to improve LE stability and able to perform impact lunge with good landing mechanics, no genu valgus  3. UWRI score to 34/34  4. Able to run as he wishes                    Patient goals: Resolve pain    Pt. Education:  [x] Yes  [] No  [] Reviewed Prior HEP/Ed  Method of Education: [x] Verbal  [x] Demo  [] Written-  Comprehension of Education:  [] Verbalizes understanding. [] Demonstrates understanding. [] Needs review. [] Demonstrates/verbalizes HEP/Ed previously given. Plan: [x] Continue current frequency toward long and short term goals.     [] Specific Instructions for subsequent treatments:       Time In:1400           Time Out: 4493    Electronically signed by:  Irma Pond, PT

## 2021-01-14 RX ORDER — TIZANIDINE 4 MG/1
4 TABLET ORAL NIGHTLY PRN
Qty: 10 TABLET | Refills: 0 | Status: SHIPPED | OUTPATIENT
Start: 2021-01-14 | End: 2021-01-24

## 2021-01-14 NOTE — TELEPHONE ENCOUNTER
Called patient and left him a message that Zanaflex was ordered and to call pharmacy first before heading over

## 2021-01-18 ENCOUNTER — HOSPITAL ENCOUNTER (OUTPATIENT)
Dept: PHYSICAL THERAPY | Facility: CLINIC | Age: 61
Setting detail: THERAPIES SERIES
Discharge: HOME | End: 2021-01-18
Payer: COMMERCIAL

## 2021-01-18 PROCEDURE — 97110 THERAPEUTIC EXERCISES: CPT

## 2021-01-18 PROCEDURE — 97140 MANUAL THERAPY 1/> REGIONS: CPT

## 2021-01-18 NOTE — FLOWSHEET NOTE
[x] [facility]  Outpatient Rehabilitation &  Therapy  [facility]   [place]: [phone]  F: [phone]      Physical Therapy Daily Treatment Note    Date:  2021  Patient Name:  Eliza Jewell    :  1960  MRN: [id number]  Physician: Soumya Mohan  Insurance: MMO (Unlimited)  Medical Diagnosis: B knee pain                   Rehab Codes: M25.561, M25.562  Onset date: 20                             Next Dr's appt.: prn  Visit# / total visits:      Cancels/No Shows: 0    Subjective:    Pain:  [x] Yes  [] No Location: L knee and quad Pain Rating: (0-10 scale) 0/10  Pain altered Tx:  [x] No  [] Yes  Action:  Comments:Pt reports he is doing well. Ran 4 and 5 miles Sat and Sun over weekend and had no knee or quad pain.   Objective:  Modalities: Vasocompression 34 deg medium pressure x 15 min L quad  Manual: hypervolt B quad  Precautions: standard  Exercises:  Exercise Reps/ Time Weight/ Level Issued for HEP   MOBO Y/N Comments   Prone               L quad strap stretch 3x30\"   x x       Flying squirrels  2x10             Hip ext (glut max) 2x10              hip Abd  2x10             Supine               2 legged bridges x30   x    NO UE support   1 legged bridges               PB hamstring curls               Bridge marches               Sidelying               Clams 90/30 deg               Angelica hip abd               Over SB               Donkey kicks  2x10      x       Opp UE/LE  2x10      x       Hip abduction  2x10   x     Gym               1/2 kneel hip flexor knee flexed stretch 2'ea   x x       Hip circles 2x10 blue  x     Banded 45 kicks 2x10 blue  x     Cups  3x5      x       Monster walks  2 lap  black    x   lat feet   Heel taps 2x10 4\"  x  3 direction   Hip thrusts x30      x       Split squat isotonic  2x10      x      Impact lunge 2x10   x     skaters x20   x     Other:     Specific Instructions for next treatment: Continue to strengthen glutes Treatment Charges: Mins Units   []  Modalities     [x]  Ther Exercise 45 3   [x]  Manual Therapy 10 1   []  Ther Activities     []  Aquatics     []  Vasocompression     []  Other     Total Treatment time 55 4       Assessment: [x] Progressing toward goals. Pt's hip extension is normal. He remains tight in quads. Pt demonstrates limb stiffness with impact lunge. Able to correct and do a better job with hip hinge upon impact after cues. [] Other:  [x] Patient would continue to benefit from skilled physical therapy services in order to: improve hip strength and quad strength as well as hip extension ROM    STG: (to be met in 10 treatments)  1. ? Pain:<3/10 L knee and quad pain to allow pt to begin running again  2. ? ROM:Normal hip extension ROM to allow for improved knee stability  3. ? Function: Able to run 3 miles with <3/10 pain L quad  4. Independent with Home Exercise Programs     LTG: (to be met in 20 treatments)  1. No pain L knee and quad  2. 5/5 hip strength to improve LE stability and able to perform impact lunge with good landing mechanics, no genu valgus  3. UWRI score to 34/34  4. Able to run as he wishes                    Patient goals: Resolve pain    Pt. Education:  [x] Yes  [] No  [] Reviewed Prior HEP/Ed  Method of Education: [x] Verbal  [x] Demo  [] Written-  Comprehension of Education:  [] Verbalizes understanding. [] Demonstrates understanding. [] Needs review. [] Demonstrates/verbalizes HEP/Ed previously given. Plan: [x] Continue current frequency toward long and short term goals. -Pt will follow up in 1 week.    [] Specific Instructions for subsequent treatments:       Time In:0700          Time Out: 6071    Electronically signed by:  Stephanie Butterfield PT

## 2021-01-20 ENCOUNTER — APPOINTMENT (OUTPATIENT)
Dept: PHYSICAL THERAPY | Facility: CLINIC | Age: 61
End: 2021-01-20
Payer: COMMERCIAL

## 2021-01-25 ENCOUNTER — HOSPITAL ENCOUNTER (OUTPATIENT)
Dept: PHYSICAL THERAPY | Facility: CLINIC | Age: 61
Setting detail: THERAPIES SERIES
Discharge: HOME | End: 2021-01-25
Payer: COMMERCIAL

## 2021-01-25 PROCEDURE — 97140 MANUAL THERAPY 1/> REGIONS: CPT

## 2021-01-25 PROCEDURE — 97112 NEUROMUSCULAR REEDUCATION: CPT

## 2021-01-25 PROCEDURE — 97110 THERAPEUTIC EXERCISES: CPT

## 2021-01-25 NOTE — FLOWSHEET NOTE
[x] [facility]  Outpatient Rehabilitation &  Therapy  [facility]   [place]: [phone]  F: [phone]      Physical Therapy Daily Treatment Note    Date:  2021  Patient Name:  Francisco Ruiz    :  1960  MRN: [id number]  Physician: Sonia Martell  Insurance: MMO (Unlimited)  Medical Diagnosis: B knee pain                   Rehab Codes: M25.561, M25.562  Onset date: 20                             Next Dr's appt.: prn  Visit# / total visits:      Cancels/No Shows: 0    Subjective:    Pain:  [x] Yes  [] No Location: L knee and quad Pain Rating: (0-10 scale) 0/10  Pain altered Tx:  [x] No  [] Yes  Action:  Comments:Pt reports he did 6 on Saturday and . Felt worse with slower than faster pace.    Objective:  Modalities:   Manual: hypervolt B quad, calves  Precautions: standard  Exercises:  Exercise Reps/ Time Weight/ Level Issued for HEP   MOBO Y/N Comments   Prone               L quad strap stretch 3x30\"   x        Flying squirrels  2x10             Hip ext (glut max) 2x10              hip Abd  2x10             Supine               2 legged bridges x30   x    NO UE support   1 legged bridges               PB hamstring curls               Bridge marches               Sidelying               Clams 90/30 deg               Angelica hip abd               Over SB               Donkey kicks  2x10      x       Opp UE/LE  2x10      x       Hip abduction  2x10   x     Gym               1/2 kneel hip flexor knee flexed stretch 2'ea   x x       Step calf stretch 2'  x x     Hip circles 2x10 blue  x     Banded 45 kicks 2x10 blue       Cups  3x5            Monster walks  2 lap  black    x   lat feet   Heel taps 2x10 4\"    3 direction   Hip thrusts x30      x       Split squat   2x10      x   Elevated back leg   Impact lunge 2x10        skaters x20        Other:     Specific Instructions for next treatment: Continue to strengthen glutes Treatment Charges: Mins Units   []  Modalities     [x]  Ther Exercise 30 2   [x]  Manual Therapy 15 1   []  Ther Activities     []  Aquatics     []  Vasocompression     [x]  NMR 12 1   Total Treatment time 57 4       Assessment: [x] Progressing toward goals. Pt's hip extension is normal. Calves both tight this date. Took a look at running this date as pt is having more difficulty  On easy run with complaints of PF pain. Pt's flor is low and swing leg is not // to ground. This in combination with thoracic extension causes over striding. Able to correct with flor to 172 from 168spm.      [x] Patient would continue to benefit from skilled physical therapy services in order to: improve hip strength and quad strength as well as hip extension ROM    STG: (to be met in 10 treatments)  1. ? Pain:<3/10 L knee and quad pain to allow pt to begin running again  2. ? ROM:Normal hip extension ROM to allow for improved knee stability  3. ? Function: Able to run 3 miles with <3/10 pain L quad  4. Independent with Home Exercise Programs     LTG: (to be met in 20 treatments)  1. No pain L knee and quad  2. 5/5 hip strength to improve LE stability and able to perform impact lunge with good landing mechanics, no genu valgus  3. UWRI score to 34/34  4. Able to run as he wishes                    Patient goals: Resolve pain    Pt. Education:  [x] Yes  [] No  [] Reviewed Prior HEP/Ed  Method of Education: [x] Verbal  [x] Demo  [] Written  Comprehension of Education:  [] Verbalizes understanding. [] Demonstrates understanding. [] Needs review. [] Demonstrates/verbalizes HEP/Ed previously given.      Plan: [x] Continue current frequency toward long and short term goals. -Pt will follow up in 1 week probable DC   [] Specific Instructions for subsequent treatments:       Time In:1400         Time Out: 1500    Electronically signed by:  Asha Barrios PT

## 2021-02-02 ENCOUNTER — TELEPHONE (OUTPATIENT)
Dept: PAIN MANAGEMENT | Age: 61
End: 2021-02-02

## 2021-02-02 NOTE — TELEPHONE ENCOUNTER
Patient called seeing what the status is on the new procedure he will be having is and explaining how the muscle relaxer is not working and if he can get something else.  Patient is coming in on  2/9/21 at 3 pm

## 2021-02-03 ENCOUNTER — HOSPITAL ENCOUNTER (OUTPATIENT)
Dept: PHYSICAL THERAPY | Facility: CLINIC | Age: 61
Setting detail: THERAPIES SERIES
Discharge: HOME | End: 2021-02-03
Payer: COMMERCIAL

## 2021-02-03 PROCEDURE — 97110 THERAPEUTIC EXERCISES: CPT

## 2021-02-03 NOTE — FLOWSHEET NOTE
Treatment Charges: Mins Units   []  Modalities     [x]  Ther Exercise 30 2   []  Manual Therapy     []  Ther Activities     []  Aquatics     []  Vasocompression     [x]  NMR 5 NC   Total Treatment time 35 2       Assessment: [x] Progressing toward goals. Pt is no longer having pain with fast or slow running and is up to 8 miles. Reviewed some of HEP focusing on glute max activation. On TM for 5 min to look at run mechanics. Pt was at 170spm self selected. Had to cue to lift heel at toe off but pt was able to do this easily and maintain. Recommended pt continue to keep flor above 170spm with his runs in order to get the desired foot to ground angle and knee flexion at IC to reduce loading rates and assist in engaging glutes. STG: (to be met in 10 treatments)  1. ? Pain:<3/10 L knee and quad pain to allow pt to begin running again  Met  2. ? ROM:Normal hip extension ROM to allow for improved knee stability Met  3. ? Function: Able to run 3 miles with <3/10 pain L quad Met  Independent with Home Exercise ProgramsMet   LTG: (to be met in 20 treatments)  1. No pain L knee and quad   MET  2. 5/5 hip strength to improve LE stability and able to perform impact lunge with good landing mechanics, no genu valgus, Pt is still testing weak at glute max at 4/4 otherwise abd and ER are 5/5 and pt has HEP to continue to address glute max weakness. Pt is able to control dynamic genu valgus with impact lunge  3. UWRI score to 34/34  MET 0%limited    4. Able to run as he wishes   MET                  Patient goals: Resolve pain    Pt. Education:  [x] Yes  [] No  [] Reviewed Prior HEP/Ed  Method of Education: [x] Verbal  [x] Demo  [] Written  Comprehension of Education:  [] Verbalizes understanding. [] Demonstrates understanding. [] Needs review. [] Demonstrates/verbalizes HEP/Ed previously given.      Plan: DC to HEP at this time      Time In:1400         Time Out: 9495 Electronically signed by:  Adelita Lopez, PT

## 2021-02-08 ENCOUNTER — TELEPHONE (OUTPATIENT)
Dept: PAIN MANAGEMENT | Age: 61
End: 2021-02-08

## 2021-02-09 ENCOUNTER — OFFICE VISIT (OUTPATIENT)
Dept: PAIN MANAGEMENT | Age: 61
End: 2021-02-09
Payer: COMMERCIAL

## 2021-02-09 VITALS
HEART RATE: 52 BPM | OXYGEN SATURATION: 96 % | HEIGHT: 71 IN | WEIGHT: 183 LBS | DIASTOLIC BLOOD PRESSURE: 83 MMHG | SYSTOLIC BLOOD PRESSURE: 137 MMHG | TEMPERATURE: 97.2 F | BODY MASS INDEX: 25.62 KG/M2

## 2021-02-09 DIAGNOSIS — M54.41 CHRONIC RIGHT-SIDED LOW BACK PAIN WITH RIGHT-SIDED SCIATICA: ICD-10-CM

## 2021-02-09 DIAGNOSIS — M51.36 DDD (DEGENERATIVE DISC DISEASE), LUMBAR: Primary | ICD-10-CM

## 2021-02-09 DIAGNOSIS — G89.29 CHRONIC RIGHT-SIDED LOW BACK PAIN WITH RIGHT-SIDED SCIATICA: ICD-10-CM

## 2021-02-09 PROCEDURE — 99213 OFFICE O/P EST LOW 20 MIN: CPT | Performed by: ANESTHESIOLOGY

## 2021-02-09 RX ORDER — METHYLPREDNISOLONE 4 MG/1
TABLET ORAL
Qty: 1 KIT | Refills: 0 | Status: SHIPPED | OUTPATIENT
Start: 2021-02-09 | End: 2021-02-15

## 2021-02-09 ASSESSMENT — ENCOUNTER SYMPTOMS: BACK PAIN: 1

## 2021-02-09 NOTE — PROGRESS NOTES
The patient is a 64 y. o. Non-/non  male. Chief Complaint   Patient presents with    Follow-up     DDD (degenerative disc disease), lumbar    Discuss Medications        HPI    24-year-old man today presenting with flareup of his chronic lower back pain issue  Pain located on the left side in the lumbar area  Pain is constant aggravated with any movement  No significant radiation in leg  Patient of tried NSAIDs and muscle relaxant and did not find it helpful  For similar flareup he had an epidural injection 3 months ago which was very helpful  Patient is diagnosed with single level lumbar degenerative disc disease  He states that in past he did a oral Medrol Dosepak which was very helpful and wants to repeat it  No changes in bladder or bowel control  No associated numbness or paresthesia  Patient here to discuss medication for his degenerative disc disease.     Past Medical History:   Diagnosis Date    Arthritis     in hip    Asthma     GERD (gastroesophageal reflux disease)       Past Surgical History:   Procedure Laterality Date    COLONOSCOPY  04/06/2018    polyp removed    COLONOSCOPY N/A 4/6/2018    COLONOSCOPY POLYPECTOMY SNARE/COLD BIOPSY performed by Nancie Peabody, MD at [address] [place] [address] 11/16/2020    RIGHT L4 AND L5 EPIDURAL STEROID INJECTION performed by Jake Parson MD at [address] [place] [address]       Social History     Socioeconomic History    Marital status:      Spouse name: None    Number of children: None    Years of education: None    Highest education level: None   Occupational History    None   Social Needs    Financial resource strain: None    Food insecurity     Worry: None     Inability: None    Transportation needs     Medical: None     Non-medical: None   Tobacco Use    Smoking status: Former Smoker     Packs/day: 0.10     Years: 10.00     Pack years: 1.00     Types: Cigars     Quit date: 2011 Years since quitting: 10.1    Smokeless tobacco: Never Used    Tobacco comment: patient states 4 cigars a year    Substance and Sexual Activity    Alcohol use: Yes     Comment: socially    Drug use: No    Sexual activity: Yes   Lifestyle    Physical activity     Days per week: None     Minutes per session: None    Stress: None   Relationships    Social connections     Talks on phone: None     Gets together: None     Attends Mandaen service: None     Active member of club or organization: None     Attends meetings of clubs or organizations: None     Relationship status: None    Intimate partner violence     Fear of current or ex partner: None     Emotionally abused: None     Physically abused: None     Forced sexual activity: None   Other Topics Concern    None   Social History Narrative    None     Family History   Problem Relation Age of Onset    No Known Problems Mother     Diabetes Father      Allergies   Allergen Reactions    Ciprofloxacin Dermatitis     Ciprofloxacin   Vitals:    02/09/21 1455   BP: 137/83   Pulse: 52   Temp: 97.2 °F (36.2 °C)   SpO2: 96%     Current Outpatient Medications   Medication Sig Dispense Refill    methylPREDNISolone (MEDROL DOSEPACK) 4 MG tablet Take by mouth.  1 kit 0    mometasone (ASMANEX, 60 METERED DOSES,) 220 MCG/INH inhaler Inhale 1 puff into the lungs 2 times daily 1 Inhaler 11    montelukast (SINGULAIR) 10 MG tablet Take 1 tablet by mouth nightly 90 tablet 3    omeprazole (PRILOSEC) 20 MG delayed release capsule TAKE 1 CAPSULE BY MOUTH 2 TIMES A  capsule 1    SUMAtriptan (IMITREX) 100 MG tablet TAKE 1 TABLET BY MOUTH ONCE AS NEEDED FOR MIGRAINE 9 tablet 0    albuterol sulfate HFA (PROAIR HFA) 108 (90 Base) MCG/ACT inhaler Inhale 2 puffs into the lungs every 6 hours as needed for Wheezing 1 Inhaler 3  fluticasone-salmeterol (ADVAIR DISKUS) 250-50 MCG/DOSE AEPB Inhale 1 puff into the lungs every 12 hours (Patient not taking: Reported on 2/9/2021) 1 Inhaler 11    meloxicam (MOBIC) 15 MG tablet Take 1 tablet by mouth daily (Patient not taking: Reported on 12/4/2020) 30 tablet 0    beclomethasone (QVAR REDIHALER) 40 MCG/ACT AERB inhaler Inhale 1 puff into the lungs 2 times daily (Patient not taking: Reported on 12/4/2020) 1 Inhaler 3    cromolyn (NASALCROM) 5.2 MG/ACT nasal spray 1 spray by Nasal route 3 times daily (Patient not taking: Reported on 2/9/2021) 1 Bottle 3     No current facility-administered medications for this visit. Review of Systems   Constitutional: Negative. Negative for fever. Genitourinary: Negative. Musculoskeletal: Positive for back pain. Psychiatric/Behavioral: Negative. Objective:  General Appearance:  Well-appearing and in no acute distress. Vital signs: (most recent): Blood pressure 137/83, pulse 52, temperature 97.2 °F (36.2 °C), height 5' 11\" (1.803 m), weight 183 lb (83 kg), SpO2 96 %. Vital signs are normal.  No fever. Output: Producing urine and producing stool. HEENT: Normal HEENT exam.    Lungs:  Normal effort and normal respiratory rate. Breath sounds clear to auscultation. He is not in respiratory distress. Heart: Normal rate. Extremities: Normal range of motion. There is no deformity. Neurological: Patient is alert and oriented to person, place and time. Patient has normal coordination. Pupils:  Pupils are equal, round, and reactive to light. Pupils are equal.   Skin:  Warm and dry. No rash or cyanosis.    Lumbar spine examination  No apparent deformity on inspection  Range of motion is limited and associated with pain  Positive tenderness over the lumbar spine  Gait is stable but antalgic    Assessment & Plan   53-year-old man today presenting with flareup of his chronic lower back pain issue Pain located on the left side in the lumbar area  Pain is constant aggravated with any movement  No significant radiation in leg  Patient of tried NSAIDs and muscle relaxant and did not find it helpful  For similar flareup he had an epidural injection 3 months ago which was very helpful  Patient is diagnosed with single level lumbar degenerative disc disease  He states that in past he did a oral Medrol Dosepak which was very helpful and wants to repeat it  No changes in bladder or bowel control  No associated numbness or paresthesia  Patient here to discuss medication for his degenerative disc disease. 1. DDD (degenerative disc disease), lumbar    2. Chronic right-sided low back pain with right-sided sciatica        No orders of the defined types were placed in this encounter. Orders Placed This Encounter   Medications    methylPREDNISolone (MEDROL DOSEPACK) 4 MG tablet     Sig: Take by mouth.      Dispense:  1 kit     Refill:  0        Recent MRI showed Modic changes  We are planning for basivertebral nerve ablation for axial back pain related to vertebral agenic Modic changes  We will do oral Medrol Dosepak  Consider for repeat epidural injection in future    Electronically signed by Cassi Mjaano MD on 2/9/2021 at 3:53 PM

## 2021-02-24 RX ORDER — OMEPRAZOLE 20 MG/1
CAPSULE, DELAYED RELEASE ORAL
Qty: 180 CAPSULE | Refills: 1 | Status: SHIPPED | OUTPATIENT
Start: 2021-02-24 | End: 2022-02-16

## 2021-03-04 ENCOUNTER — TELEPHONE (OUTPATIENT)
Dept: UROLOGY | Age: 61
End: 2021-03-04

## 2021-03-04 DIAGNOSIS — Z12.5 SPECIAL SCREENING FOR MALIGNANT NEOPLASM OF PROSTATE: Primary | ICD-10-CM

## 2021-03-05 ENCOUNTER — TELEPHONE (OUTPATIENT)
Dept: PAIN MANAGEMENT | Age: 61
End: 2021-03-05

## 2021-03-05 NOTE — TELEPHONE ENCOUNTER
Called patient to let him know that we will need a signature for the new procedure for them to work on insurance approving the procedure.  Patient states that he will be in to sign the paperwork

## 2021-03-22 ENCOUNTER — HOSPITAL ENCOUNTER (OUTPATIENT)
Age: 61
Setting detail: SPECIMEN
Discharge: HOME | End: 2021-03-22
Payer: COMMERCIAL

## 2021-03-22 DIAGNOSIS — Z12.5 SPECIAL SCREENING FOR MALIGNANT NEOPLASM OF PROSTATE: ICD-10-CM

## 2021-03-22 LAB — PROSTATE SPECIFIC ANTIGEN: 1.37 UG/L

## 2021-03-29 ENCOUNTER — OFFICE VISIT (OUTPATIENT)
Dept: UROLOGY | Age: 61
End: 2021-03-29
Payer: COMMERCIAL

## 2021-03-29 VITALS
SYSTOLIC BLOOD PRESSURE: 140 MMHG | WEIGHT: 172 LBS | TEMPERATURE: 97.7 F | BODY MASS INDEX: 23.99 KG/M2 | DIASTOLIC BLOOD PRESSURE: 87 MMHG

## 2021-03-29 DIAGNOSIS — R35.1 NOCTURIA: ICD-10-CM

## 2021-03-29 DIAGNOSIS — N52.01 ERECTILE DYSFUNCTION DUE TO ARTERIAL INSUFFICIENCY: ICD-10-CM

## 2021-03-29 DIAGNOSIS — Z12.5 SPECIAL SCREENING FOR MALIGNANT NEOPLASM OF PROSTATE: Primary | ICD-10-CM

## 2021-03-29 PROCEDURE — 99214 OFFICE O/P EST MOD 30 MIN: CPT | Performed by: UROLOGY

## 2021-03-29 RX ORDER — TADALAFIL 5 MG/1
5 TABLET ORAL DAILY
Qty: 90 TABLET | Refills: 1 | Status: SHIPPED | OUTPATIENT
Start: 2021-03-29 | End: 2022-05-15 | Stop reason: SDUPTHER

## 2021-03-29 ASSESSMENT — ENCOUNTER SYMPTOMS
COUGH: 0
BACK PAIN: 0
SHORTNESS OF BREATH: 0
NAUSEA: 0
CONSTIPATION: 0
EYE REDNESS: 0
EYE PAIN: 0
VOMITING: 0
ABDOMINAL PAIN: 0
DIARRHEA: 0
WHEEZING: 0

## 2021-03-29 NOTE — PROGRESS NOTES
[address] [address]  Dept: [address] [facility] Urology Office Note - Established    Patient:  Rashmi Roca  YOB: 1960  Date: 3/29/2021    The patient is a 64 y.o. male who presents todayfor evaluation of the following problems:   Chief Complaint   Patient presents with    1 Year Follow Up       HPI  Rhonda Ryan is a 70-year-old gentleman who has seen us in the past for prostate cancer screening. He has no family history of prostate cancer. His most recent PSA is 1.37 which is essentially stable. He does have nocturia 2 times per night. This is a bit worse over the last 18 months. He does also complain of worsening erectile dysfunction. He has good libido. He is able to achieve erections but has trouble maintaining them. He has tried Cialis and Viagra in the past with some success. Summary of old records: N/A    Additional History: N/A    Procedures Today: N/A    Urinalysis today:  No results found for this visit on 03/29/21.   Last several PSA's:  Lab Results   Component Value Date    PSA 1.37 03/22/2021    PSA 1.25 11/15/2019    PSA 1.25 10/10/2018     Last total testosterone:  No results found for: TESTOSTERONE    AUA Symptom Score (3/29/2021):                               Last BUN and creatinine:  Lab Results   Component Value Date    BUN 17 05/24/2017     Lab Results   Component Value Date    CREATININE 0.79 05/24/2017       Additional Lab/Culture results: none    Imaging Reviewed during this Office Visit: none  (results were independently reviewed by physician and radiology report verified)    PAST MEDICAL, FAMILY AND SOCIAL HISTORY UPDATE:  Past Medical History:   Diagnosis Date    Arthritis     in hip    Asthma     GERD (gastroesophageal reflux disease)      Past Surgical History:   Procedure Laterality Date    COLONOSCOPY  04/06/2018    polyp removed    COLONOSCOPY N/A 4/6/2018 COLONOSCOPY POLYPECTOMY SNARE/COLD [facility] 11/16/2020    RIGHT L4 AND L5 EPIDURAL STEROID INJECTION performed by Deandre Lewis MD at [address] [place] [address]       Family History   Problem Relation Age of Onset    No Known Problems Mother     Diabetes Father      Outpatient Medications Marked as Taking for the 3/29/21 encounter (Office Visit) with Joy Quinteros MD   Medication Sig Dispense Refill    tadalafil (CIALIS) 5 MG tablet Take 1 tablet by mouth daily 90 tablet 1    omeprazole (PRILOSEC) 20 MG delayed release capsule TAKE 1 CAPSULE BY MOUTH 2 TIMES A  capsule 1    mometasone (ASMANEX, 60 METERED DOSES,) 220 MCG/INH inhaler Inhale 1 puff into the lungs 2 times daily 1 Inhaler 11    fluticasone-salmeterol (ADVAIR DISKUS) 250-50 MCG/DOSE AEPB Inhale 1 puff into the lungs every 12 hours 1 Inhaler 11    montelukast (SINGULAIR) 10 MG tablet Take 1 tablet by mouth nightly 90 tablet 3    cromolyn (NASALCROM) 5.2 MG/ACT nasal spray 1 spray by Nasal route 3 times daily 1 Bottle 3    albuterol sulfate HFA (PROAIR HFA) 108 (90 Base) MCG/ACT inhaler Inhale 2 puffs into the lungs every 6 hours as needed for Wheezing 1 Inhaler 3       Ciprofloxacin  Social History     Tobacco Use   Smoking Status Former Smoker    Packs/day: 0.10    Years: 10.00    Pack years: 1.00    Types: Cigars    Quit date: 2011    Years since quitting: 10.2   Smokeless Tobacco Never Used   Tobacco Comment    patient states 4 cigars a year      (Ifpatient a smoker, smoking cessation counseling offered)    Social History     Substance and Sexual Activity   Alcohol Use Yes    Comment: socially       REVIEW OF SYSTEMS:  Review of Systems    Physical Exam:      Vitals:    03/29/21 0909   BP: (!) 140/87   Temp:      Body mass index is 23.99 kg/m².   Patient is a 64 y.o. male in no acute distress and alert and oriented to person, place and time. Physical Exam  Constitutional: Patient in no acute distress. Neuro: Alert and oriented to person, place and time. Psych: Mood normal, affect normal  Skin: No rash noted  HEENT: Head: Normocephalic andatraumatic  Conjunctivae and EOM are normal. Pupils are equal, round  Nose:Normal  Right External Ear: Normal; Left External Ear: Normal  Mouth: Mucosa Moist  Neck: Supple  Lungs: Respiratory effort is normal  Cardiovascular: Warm & Pink  Abdomen: Soft, non-tender, non-distended with no CVA,  No flank tenderness,  Or hepatosplenomegaly   Lymphatics: No palpablelymphadenopathy. Bladder non-tender and not distended. Musculoskeletal: Normal gait and station  Testiscles: Normal, bilaterally  Prostate: smooth, no nodules, mildly enlarged    Assessment and Plan      1. Special screening for malignant neoplasm of prostate    2. Nocturia    3. Erectile dysfunction due to arterial insufficiency           Plan:   Tadalafil daily  F/u 3 mo     Return in about 3 months (around 6/29/2021). Prescriptions Ordered:  Orders Placed This Encounter   Medications    tadalafil (CIALIS) 5 MG tablet     Sig: Take 1 tablet by mouth daily     Dispense:  90 tablet     Refill:  1     Orders Placed:  Orders Placed This Encounter   Procedures    PSA, Diagnostic     Standing Status:   Future     Standing Expiration Date:   3/29/2022           Kristine Hurd MD    Agree with the ROS entered by the MA.

## 2021-05-14 ENCOUNTER — TELEPHONE (OUTPATIENT)
Dept: PAIN MANAGEMENT | Age: 61
End: 2021-05-14

## 2021-05-14 NOTE — TELEPHONE ENCOUNTER
Pt states he was seen in February and was told that his procedure will be scheduled once the equipment is available. Pt calling to check on status of scheduling basivertebral nerve ablation.

## 2021-05-28 ENCOUNTER — TELEPHONE (OUTPATIENT)
Dept: NEUROSURGERY | Age: 61
End: 2021-05-28

## 2021-05-28 NOTE — TELEPHONE ENCOUNTER
Spoke to Dr. Asha Frances office. Procedure has been on hold due to [address] having trouble with certain insurances not paying or paying very little so they are only moving forward with certain insurances. The office wont know until next week when the rep comes back if this clients insurance is covered. If not the office said they could be scheduling into next year but they wont have any absolute answers until they speak with the rep again.

## 2021-06-04 ENCOUNTER — OFFICE VISIT (OUTPATIENT)
Dept: PULMONOLOGY | Age: 61
End: 2021-06-04
Payer: COMMERCIAL

## 2021-06-04 VITALS
HEART RATE: 57 BPM | HEIGHT: 71 IN | DIASTOLIC BLOOD PRESSURE: 78 MMHG | SYSTOLIC BLOOD PRESSURE: 134 MMHG | TEMPERATURE: 98.6 F | OXYGEN SATURATION: 96 % | BODY MASS INDEX: 24.78 KG/M2 | WEIGHT: 177 LBS

## 2021-06-04 DIAGNOSIS — K21.9 GASTROESOPHAGEAL REFLUX DISEASE, UNSPECIFIED WHETHER ESOPHAGITIS PRESENT: ICD-10-CM

## 2021-06-04 DIAGNOSIS — R05.3 CHRONIC COUGH: ICD-10-CM

## 2021-06-04 DIAGNOSIS — J45.51 ASTHMA, SEVERE PERSISTENT, POORLY-CONTROLLED, WITH ACUTE EXACERBATION: Primary | ICD-10-CM

## 2021-06-04 PROCEDURE — 99213 OFFICE O/P EST LOW 20 MIN: CPT | Performed by: INTERNAL MEDICINE

## 2021-06-04 RX ORDER — ALBUTEROL SULFATE 90 UG/1
2 AEROSOL, METERED RESPIRATORY (INHALATION) EVERY 6 HOURS PRN
Qty: 3 INHALER | Refills: 3 | Status: SHIPPED | OUTPATIENT
Start: 2021-06-04 | End: 2022-04-06 | Stop reason: SDUPTHER

## 2021-06-04 RX ORDER — MONTELUKAST SODIUM 10 MG/1
10 TABLET ORAL NIGHTLY
Qty: 90 TABLET | Refills: 3 | Status: SHIPPED | OUTPATIENT
Start: 2021-06-04 | End: 2022-01-25 | Stop reason: SDUPTHER

## 2021-06-04 ASSESSMENT — ENCOUNTER SYMPTOMS
COUGH: 1
WHEEZING: 1
CHEST TIGHTNESS: 1
EYES NEGATIVE: 1

## 2021-06-04 NOTE — PROGRESS NOTES
Subjective:      Patient ID: Arlette Trujillo is a 63 y.o. male being seen in my clinic for   Chief Complaint   Patient presents with    Asthma     follow up        HPI  Follow-up visit for asthma. Since his last visit 6 months ago asthma has been under only fair control. Uses his albuterol inhaler prior to exercise and sometimes during. Remains on Asmanex and montelukast.  Reports both winter and summer symptoms. States that albuterol seems to help him the most.  I suggested that he go on a LABA. Cough persists. Review of Systems   Constitutional: Negative. HENT: Negative. Eyes: Negative. Respiratory: Positive for cough, chest tightness and wheezing. Cardiovascular: Negative. All other systems reviewed and are negative. Objective:     Vitals:    06/04/21 1552   BP: 134/78   Site: Right Upper Arm   Position: Sitting   Cuff Size: Medium Adult   Pulse: 57   Temp: 98.6 °F (37 °C)   TempSrc: Temporal   SpO2: 96%  Comment: room air at rest   Weight: 177 lb (80.3 kg)   Height: 5' 11\" (1.803 m)     Current Outpatient Medications   Medication Sig Dispense Refill    albuterol sulfate HFA (PROAIR HFA) 108 (90 Base) MCG/ACT inhaler Inhale 2 puffs into the lungs every 6 hours as needed for Wheezing 3 Inhaler 3    mometasone-formoterol (DULERA) 200-5 MCG/ACT inhaler Inhale 2 puffs into the lungs 2 times daily 3 Inhaler 3    montelukast (SINGULAIR) 10 MG tablet Take 1 tablet by mouth nightly 90 tablet 3    tadalafil (CIALIS) 5 MG tablet Take 1 tablet by mouth daily 90 tablet 1    omeprazole (PRILOSEC) 20 MG delayed release capsule TAKE 1 CAPSULE BY MOUTH 2 TIMES A  capsule 1    SUMAtriptan (IMITREX) 100 MG tablet TAKE 1 TABLET BY MOUTH ONCE AS NEEDED FOR MIGRAINE 9 tablet 0    cromolyn (NASALCROM) 5.2 MG/ACT nasal spray 1 spray by Nasal route 3 times daily 1 Bottle 3     No current facility-administered medications for this visit. Physical Exam  Vitals and nursing note reviewed. Constitutional:       Appearance: He is well-developed. Eyes:      General: No scleral icterus. Conjunctiva/sclera: Conjunctivae normal.   Neck:      Thyroid: No thyromegaly. Vascular: No JVD. Trachea: No tracheal deviation. Cardiovascular:      Rate and Rhythm: Normal rate and regular rhythm. Heart sounds: Normal heart sounds. No murmur heard. No gallop. Pulmonary:      Effort: Pulmonary effort is normal. No respiratory distress. Breath sounds: No wheezing or rales. Chest:      Chest wall: No tenderness. Abdominal:      Palpations: Abdomen is soft. Tenderness: There is no abdominal tenderness. Musculoskeletal:      Cervical back: Neck supple. Lymphadenopathy:      Cervical: No cervical adenopathy. Skin:     General: Skin is warm and dry. Neurological:      Mental Status: He is alert and oriented to person, place, and time. Wt Readings from Last 3 Encounters:   06/04/21 177 lb (80.3 kg)   03/29/21 172 lb (78 kg)   02/09/21 183 lb (83 kg)     Results for orders placed or performed during the hospital encounter of 03/22/21   PSA Screening   Result Value Ref Range    PSA 1.37 <4.1 ug/L       :      1. Asthma, severe persistent, poorly-controlled, with acute exacerbation    2. Chronic cough    3. Gastroesophageal reflux disease, unspecified whether esophagitis present      Patient Active Problem List   Diagnosis    Right hip pain    Arthritis of right hip    Chronic midline low back pain without sciatica    Tendonitis of knee, right    Acute pain of right knee    DDD (degenerative disc disease), lumbar         Plan:      1. Discontinue Asmanex and switch to Dulera 200 mcg. 2. Refilled montelukast 10 mg and Ventolin HFA. 3. Discussed strategies for management of asthma including escalation and de-escalation of therapy. 4. Patient received both of his Covid vaccinations. 5. Return in 1 year. Sooner if new or advancing symptoms.     Orders Placed This

## 2021-07-01 ENCOUNTER — TELEMEDICINE (OUTPATIENT)
Dept: NEUROSURGERY | Age: 61
End: 2021-07-01
Payer: COMMERCIAL

## 2021-07-01 DIAGNOSIS — M47.816 LUMBAR FACET ARTHROPATHY: Primary | ICD-10-CM

## 2021-07-01 PROCEDURE — 99212 OFFICE O/P EST SF 10 MIN: CPT | Performed by: NURSE PRACTITIONER

## 2021-07-01 RX ORDER — PREDNISONE 20 MG/1
TABLET ORAL
Qty: 30 TABLET | Refills: 0 | Status: SHIPPED | OUTPATIENT
Start: 2021-07-01 | End: 2021-07-16

## 2021-07-01 NOTE — PROGRESS NOTES
omeprazole (PRILOSEC) 20 MG delayed release capsule TAKE 1 CAPSULE BY MOUTH 2 TIMES A DAY  Rodo Mccrary MD   SUMAtriptan (IMITREX) 100 MG tablet TAKE 1 TABLET BY MOUTH ONCE AS NEEDED FOR MIGRAINE  Rodo Mccrary MD   cromolyn (NASALCROM) 5.2 MG/ACT nasal spray 1 spray by Nasal route 3 times daily  Malcolm Odell DO       Social History     Tobacco Use    Smoking status: Former Smoker     Packs/day: 0.10     Years: 10.00     Pack years: 1.00     Types: Cigars     Quit date: 2011     Years since quitting: 10.5    Smokeless tobacco: Never Used    Tobacco comment: patient states 4 cigars a year    Vaping Use    Vaping Use: Never used   Substance Use Topics    Alcohol use: Yes     Comment: socially    Drug use: No        Allergies   Allergen Reactions    Ciprofloxacin Dermatitis   ,   Past Medical History:   Diagnosis Date    Arthritis     in hip    Asthma     GERD (gastroesophageal reflux disease)    ,   Past Surgical History:   Procedure Laterality Date    COLONOSCOPY  04/06/2018    polyp removed    COLONOSCOPY N/A 4/6/2018    COLONOSCOPY POLYPECTOMY SNARE/COLD BIOPSY performed by Javon Frey MD at [facility] Right 11/16/2020    RIGHT L4 AND L5 EPIDURAL STEROID INJECTION [facility]  Constitutional: Negative for activity change and appetite change. HENT: Negative for ear pain and facial swelling. Eyes: Negative for discharge and itching. Respiratory: Negative for choking and chest tightness. Cardiovascular: Negative for chest pain and leg swelling. Gastrointestinal: Negative for nausea and abdominal pain. Endocrine: Negative for cold intolerance and heat intolerance. Genitourinary: Negative for frequency and flank pain. Musculoskeletal: Negative for myalgias and joint swelling. Skin: Negative for rash and wound.    Allergic/Immunologic: Negative for environmental allergies and food allergies. Hematological: Negative for adenopathy. Does not bruise/bleed easily. Psychiatric/Behavioral: Negative for self-injury. The patient is not nervous/anxious. PHYSICAL EXAMINATION:  [INSTRUCTIONS:  \"[x]\" Indicates a positive item  \"[]\" Indicates a negative item  -- DELETE ALL ITEMS NOT EXAMINED]  Vital Signs: (As obtained by patient/caregiver at home)    Constitutional: [x] Appears well-developed and well-nourished [x] No apparent distress      [] Abnormal   Mental status  [x] Alert and awake  [x] Oriented to person/place/time [x]Able to follow commands        Eyes:  EOM    [x]  Normal  [] Abnormal-  Sclera  [x]  Normal  [] Abnormal -         Discharge [x]  None visible  [] Abnormal -    HENT:   [x] Normocephalic, atraumatic. [] Abnormal   [x] Mouth/Throat: Mucous membranes are moist.     External Ears [x] Normal  [] Abnormal-    Neck: [x] No visualized mass     Pulmonary/Chest: [x] Respiratory effort normal.  [x] No visualized signs of difficulty breathing or respiratory distress        [] Abnormal      Musculoskeletal:   [x] Normal gait with no signs of ataxia         [x] Normal range of motion of neck        [] Abnormal       Neurological:        [x] No Facial Asymmetry (Cranial nerve 7 motor function) (limited exam to video visit)          [x] No gaze palsy        [] Abnormal         Skin:        [x] No significant exanthematous lesions or discoloration noted on facial skin         [] Abnormal            Psychiatric:       [x] Normal Affect [] Abnormal        [x] No Hallucinations      Due to this being a TeleHealth encounter, evaluation of the following organ systems is limited: Vitals/Constitutional/EENT/Resp/CV/GI//MS/Neuro/Skin/Heme-Lymph-Imm. ASSESSMENT/PLAN:   Diagnosis Orders   1.  Lumbar facet arthropathy  predniSONE (DELTASONE) 20 MG tablet     Patient with stable axial symptoms, no red flag findings such as saddle anesthesia, weakness or radiation into lower extremities. Will provide prednisone taper as this has provided significant improvement in the past, to bridge patient until intervention can be scheduled. We will follow up with the patient after pain management interventions. Contact office sooner with any new or worsening symptoms. Return in about 2 months (around 9/1/2021), or if symptoms worsen or fail to improve. An  electronic signature was used to authenticate this note. --SLIME Combs - CNP on 7/5/2021 at 10:01 PM    Time Spent in Counseling: 15 minutes  Patient given educational materials - see patient instructions. Discussed use, benefit, and side effects of prescribed medications. Personally reviewed imaging with patients and all questions answered. Pt voiced understanding. Patient agreed with treatment plan. Follow up as directed above. Pursuant to the emergency declaration under the [facility], [facility] waiver authority and the [employer] Act, this Virtual  Visit was conducted, with patient's consent, to reduce the patient's risk of exposure to COVID-19 and provide continuity of care for an established patient. Services were provided through a video synchronous discussion virtually to substitute for in-person clinic visit. This is a telehealth visit that was performed with the originating site at Patient Location of home and Provider Location of [facility]. Verbal consent to participate in video visit was obtained. Pursuant to the emergency declaration under the [address] [facility], [address] waiver authority and the [employer] Act, this Virtual Visit was conducted, with patient's consent, to reduce the patient's risk of exposure to COVID-19 and provide continuity of care for an established patient.  Services were provided through a video synchronous discussion virtually to substitute for in-person clinic visit. I discussed with the patient the nature of our telehealth visits via interactive/real-time audio/video that:  - I would evaluate the patient and recommend diagnostics and treatments based on my assessment  - Our sessions are not being recorded and that personal health information is protected  - Our team would provide follow up care in person if/when the patient needs it.

## 2021-08-25 ENCOUNTER — OFFICE VISIT (OUTPATIENT)
Dept: PAIN MANAGEMENT | Age: 61
End: 2021-08-25
Payer: COMMERCIAL

## 2021-08-25 VITALS
HEART RATE: 51 BPM | HEIGHT: 71 IN | DIASTOLIC BLOOD PRESSURE: 73 MMHG | BODY MASS INDEX: 24.69 KG/M2 | OXYGEN SATURATION: 96 % | SYSTOLIC BLOOD PRESSURE: 124 MMHG

## 2021-08-25 DIAGNOSIS — M54.50 CHRONIC MIDLINE LOW BACK PAIN WITHOUT SCIATICA: Primary | ICD-10-CM

## 2021-08-25 DIAGNOSIS — M51.36 DDD (DEGENERATIVE DISC DISEASE), LUMBAR: Chronic | ICD-10-CM

## 2021-08-25 DIAGNOSIS — G89.29 CHRONIC MIDLINE LOW BACK PAIN WITHOUT SCIATICA: Primary | ICD-10-CM

## 2021-08-25 PROCEDURE — 99213 OFFICE O/P EST LOW 20 MIN: CPT | Performed by: ANESTHESIOLOGY

## 2021-08-25 ASSESSMENT — ENCOUNTER SYMPTOMS
RESPIRATORY NEGATIVE: 1
BACK PAIN: 1

## 2021-08-25 NOTE — PROGRESS NOTES
The patient is a 63 y. o. Non- / non  male. Chief Complaint   Patient presents with    Back Pain        HPI  Back pain  Axial lower lumbar spinal  No radiation in leg  No associated numbness and paresthesia  Onset more than 1 year ago  Describes it as constant aching pain that fluctuates with intensity depending upon the activity level affecting quality of life  Pain aggravated with forward bending and axial loading  No changes in bladder or bowel control    Patient completed therapy without any improvement  He tried NSAIDs  He tried different spine interventional procedures with little relief    Lumbar spine MRI showed no surgical pathology  Showed L4-L5 and S1 level endplate degenerative changes with Modic changes in the vertebrae's  We have recommended basivertebral nerve ablation as we suspect vertebral agenic pain is the main etiology  Pending prior authorization    Patient have done multiple courses of steroids in the past  Patient presents to the office for chronic low back pain, left side. Patient rates pain today 2/10, the pain is constant and described as sharp and dull pain. Pain is aggravated with any movement and alleviated by nothing.      Past Medical History:   Diagnosis Date    Arthritis     in hip    Asthma     GERD (gastroesophageal reflux disease)       Past Surgical History:   Procedure Laterality Date    COLONOSCOPY  04/06/2018    polyp removed    COLONOSCOPY N/A 4/6/2018    COLONOSCOPY POLYPECTOMY SNARE/COLD BIOPSY performed by Royal Regi MD at [facility] Right 11/16/2020    RIGHT L4 AND L5 EPIDURAL STEROID INJECTION performed by Trino Stewart MD at [address] [place] [address]       Social History     Socioeconomic History    Marital status:      Spouse name: None    Number of children: None    Years of education: None    Highest education level: None   Occupational History    None   Tobacco Use    Smoking status: Former Smoker     Packs/day: 0.10     Years: 10.00     Pack years: 1.00     Types: Cigars     Quit date: 2011     Years since quitting: 10.6    Smokeless tobacco: Never Used    Tobacco comment: patient states 4 cigars a year    Vaping Use    Vaping Use: Never used   Substance and Sexual Activity    Alcohol use: Yes     Comment: socially    Drug use: No    Sexual activity: Yes   Other Topics Concern    None   Social History Narrative    None     Social Determinants of Health     Financial Resource Strain:     Difficulty of Paying Living Expenses:    Food Insecurity:     Worried About Running Out of Food in the Last Year:     Ran Out of Food in the Last Year:    Transportation Needs:     Lack of Transportation (Medical):      Lack of Transportation (Non-Medical):    Physical Activity:     Days of Exercise per Week:     Minutes of Exercise per Session:    Stress:     Feeling of Stress :    Social Connections:     Frequency of Communication with Friends and Family:     Frequency of Social Gatherings with Friends and Family:     Attends Restorationism Services:     Active Member of Clubs or Organizations:     Attends Club or Organization Meetings:     Marital Status:    Intimate Partner Violence:     Fear of Current or Ex-Partner:     Emotionally Abused:     Physically Abused:     Sexually Abused:      Family History   Problem Relation Age of Onset    No Known Problems Mother     Diabetes Father      Allergies   Allergen Reactions    Ciprofloxacin Dermatitis     Ciprofloxacin   Vitals:    08/25/21 1604   BP: 124/73   Pulse: 51   SpO2: 96%     Current Outpatient Medications   Medication Sig Dispense Refill    albuterol sulfate HFA (PROAIR HFA) 108 (90 Base) MCG/ACT inhaler Inhale 2 puffs into the lungs every 6 hours as needed for Wheezing 3 Inhaler 3    mometasone-formoterol (DULERA) 200-5 MCG/ACT inhaler Inhale 2 puffs into the lungs 2 times daily 3 Inhaler 3    montelukast (SINGULAIR) 10 MG tablet Take 1 tablet by mouth nightly 90 tablet 3    tadalafil (CIALIS) 5 MG tablet Take 1 tablet by mouth daily 90 tablet 1    omeprazole (PRILOSEC) 20 MG delayed release capsule TAKE 1 CAPSULE BY MOUTH 2 TIMES A  capsule 1    SUMAtriptan (IMITREX) 100 MG tablet TAKE 1 TABLET BY MOUTH ONCE AS NEEDED FOR MIGRAINE 9 tablet 0    cromolyn (NASALCROM) 5.2 MG/ACT nasal spray 1 spray by Nasal route 3 times daily (Patient not taking: Reported on 8/25/2021) 1 Bottle 3     No current facility-administered medications for this visit. Review of Systems   Constitutional: Negative. Negative for fever. Respiratory: Negative. Musculoskeletal: Positive for arthralgias and back pain. Neurological: Negative. Objective:  General Appearance:  Well-appearing and in no acute distress. Vital signs: (most recent): Blood pressure 124/73, pulse 51, height 5' 11\" (1.803 m), SpO2 96 %. Vital signs are normal.  No fever. Output: Producing urine and producing stool. HEENT: Normal HEENT exam.    Lungs:  Normal effort and normal respiratory rate. Breath sounds clear to auscultation. He is not in respiratory distress. Heart: Normal rate. Extremities: Normal range of motion. There is no deformity. Neurological: Patient is alert and oriented to person, place and time. Patient has normal coordination. Pupils:  Pupils are equal, round, and reactive to light. Pupils are equal.   Skin:  Warm and dry. No rash or cyanosis.       Assessment & Plan     Back pain  Axial lower lumbar spinal  No radiation in leg  No associated numbness and paresthesia  Onset more than 1 year ago  Describes it as constant aching pain that fluctuates with intensity depending upon the activity level affecting quality of life  Pain aggravated with forward bending and axial loading  No changes in bladder or bowel control    Patient completed therapy without any improvement  He tried NSAIDs  He tried different spine interventional procedures with little relief    Lumbar spine MRI showed no surgical pathology  Showed L4-L5 and S1 level endplate degenerative changes with Modic changes in the vertebrae's  We have recommended basivertebral nerve ablation as we suspect vertebral agenic pain is the main etiology  Pending prior authorization    Patient have done multiple courses of steroids in the past    1. Chronic midline low back pain without sciatica    2.  DDD (degenerative disc disease), lumbar        Will continue to pursue prior authorization process  Level 2 appeal denied  We will file Level 3 appeal    Electronically signed by Mark Ngo MD on 8/25/2021 at 4:32 PM

## 2021-08-30 ENCOUNTER — OFFICE VISIT (OUTPATIENT)
Dept: UROLOGY | Age: 61
End: 2021-08-30
Payer: COMMERCIAL

## 2021-08-30 VITALS
DIASTOLIC BLOOD PRESSURE: 77 MMHG | HEART RATE: 54 BPM | BODY MASS INDEX: 24.78 KG/M2 | HEIGHT: 71 IN | TEMPERATURE: 97.3 F | SYSTOLIC BLOOD PRESSURE: 121 MMHG | WEIGHT: 177 LBS

## 2021-08-30 DIAGNOSIS — R35.1 NOCTURIA: ICD-10-CM

## 2021-08-30 DIAGNOSIS — N52.01 ERECTILE DYSFUNCTION DUE TO ARTERIAL INSUFFICIENCY: Primary | ICD-10-CM

## 2021-08-30 DIAGNOSIS — Z12.5 SPECIAL SCREENING FOR MALIGNANT NEOPLASM OF PROSTATE: ICD-10-CM

## 2021-08-30 PROCEDURE — 99213 OFFICE O/P EST LOW 20 MIN: CPT | Performed by: UROLOGY

## 2021-08-30 ASSESSMENT — ENCOUNTER SYMPTOMS
BACK PAIN: 0
WHEEZING: 0
DIARRHEA: 0
ABDOMINAL PAIN: 0
CONSTIPATION: 0
EYE PAIN: 0
SHORTNESS OF BREATH: 0
EYE REDNESS: 0
NAUSEA: 0
COUGH: 0
VOMITING: 0

## 2021-08-30 NOTE — PROGRESS NOTES
[address] [address]  Dept:  Nicholas Beyer [facility] Urology Office Note - Established    Patient:  Mary Acosta  YOB: 1960  Date: 8/30/2021    The patient is a 63 y.o. male who presents todayfor evaluation of the following problems:   Chief Complaint   Patient presents with    Erectile Dysfunction     med follow        HPI  This is a very pleasant 53-year-old gentleman who has erectile dysfunction. He started taking Cialis every other day 5 mg since his last visit. He has done well with this. He does not notice any lower efficacy as compared to taking it daily. He does have a little bit worsening nocturia but this is tolerable. His last PSA was earlier this year and it was normal.    Summary of old records: N/A    Additional History: N/A    Procedures Today: N/A    Urinalysis today:  No results found for this visit on 08/30/21. Last several PSA's:  Lab Results   Component Value Date    PSA 1.37 03/22/2021    PSA 1.25 11/15/2019    PSA 1.25 10/10/2018     Last total testosterone:  No results found for: TESTOSTERONE    AUA Symptom Score (8/30/2021):   INCOMPLETE EMPTYING: How often have you had the sensation of not emptying your bladder?: Not at all  FREQUENCY: How often do you have to urinate less than every two hours?: Not at all  INTERMITTENCY: How often have you found you stopped and started again several times when you urinated?: Not at all  URGENCY: How often have you found it difficult to postpone urination?: Not at all  WEAK STREAM: How often have you had a weak urinary stream?: Not at all  STRAINING: How often have you had to strain to start  urination?: Not at all  NOCTURIA: How many times did you typically get up at night to uriniate?: 1 Time  TOTAL I-PSS SCORE[de-identified] 1  How would you feel if you were to spend the rest of your life with your urinary condition?: Mixe    Last BUN and creatinine:  Lab Results   Component Value Date    BUN 17 05/24/2017     Lab Results   Component Value Date    CREATININE 0.79 05/24/2017       Additional Lab/Culture results: none    Imaging Reviewed during this Office Visit: none  (results were independently reviewed by physician and radiology report verified)    PAST MEDICAL, FAMILY AND SOCIAL HISTORY UPDATE:  Past Medical History:   Diagnosis Date    Arthritis     in hip    Asthma     GERD (gastroesophageal reflux disease)      Past Surgical History:   Procedure Laterality Date    COLONOSCOPY  04/06/2018    polyp removed    COLONOSCOPY N/A 4/6/2018    COLONOSCOPY POLYPECTOMY SNARE/COLD BIOPSY performed by Patrick Lane MD at [facility] [address] Right 11/16/2020    RIGHT L4 AND L5 EPIDURAL STEROID INJECTION performed by Wily Spicer MD at [address] [place] [address]       Family History   Problem Relation Age of Onset    No Known Problems Mother     Diabetes Father      Outpatient Medications Marked as Taking for the 8/30/21 encounter (Office Visit) with Bridget Mcdermott MD   Medication Sig Dispense Refill    albuterol sulfate HFA (PROAIR HFA) 108 (90 Base) MCG/ACT inhaler Inhale 2 puffs into the lungs every 6 hours as needed for Wheezing 3 Inhaler 3    mometasone-formoterol (DULERA) 200-5 MCG/ACT inhaler Inhale 2 puffs into the lungs 2 times daily 3 Inhaler 3    montelukast (SINGULAIR) 10 MG tablet Take 1 tablet by mouth nightly 90 tablet 3    tadalafil (CIALIS) 5 MG tablet Take 1 tablet by mouth daily 90 tablet 1    omeprazole (PRILOSEC) 20 MG delayed release capsule TAKE 1 CAPSULE BY MOUTH 2 TIMES A  capsule 1    cromolyn (NASALCROM) 5.2 MG/ACT nasal spray 1 spray by Nasal route 3 times daily 1 Bottle 3       Ciprofloxacin  Social History     Tobacco Use   Smoking Status Former Smoker    Packs/day: 0.10    Years: 10.00    Pack years: 1.00    Types: Cigars    Quit date: 2011    Years since quitting: 10.6

## 2021-09-08 LAB
CHOLESTEROL/HDL RATIO: 3.7
CHOLESTEROL: 187 MG/DL
GLUCOSE BLD-MCNC: 95 MG/DL (ref 70–99)
HDLC SERPL-MCNC: 51 MG/DL
LDL CHOLESTEROL: 120 MG/DL (ref 0–130)
PATIENT FASTING?: YES
TRIGL SERPL-MCNC: 78 MG/DL
VLDLC SERPL CALC-MCNC: NORMAL MG/DL (ref 1–30)

## 2021-10-19 ENCOUNTER — TELEPHONE (OUTPATIENT)
Dept: PAIN MANAGEMENT | Age: 61
End: 2021-10-19

## 2021-10-19 NOTE — TELEPHONE ENCOUNTER
LVM requesting pt to call call the office back if he would like to schedule Intracept procedure on 10/28/21

## 2021-10-20 ENCOUNTER — TELEPHONE (OUTPATIENT)
Dept: PAIN MANAGEMENT | Age: 61
End: 2021-10-20

## 2021-10-20 DIAGNOSIS — M54.89 VERTEBROGENIC PAIN: Primary | ICD-10-CM

## 2021-10-20 NOTE — TELEPHONE ENCOUNTER
Pt agreed to schedule Intracept procedure at [phone] on 10/28/21 at noon. Pt given prep instructions and arrival time of 10:30 am. Pt verbalized understanding.  Please place order in Epic

## 2021-10-21 PROBLEM — M54.89 VERTEBROGENIC PAIN: Status: ACTIVE | Noted: 2021-10-21

## 2021-10-24 ENCOUNTER — HOSPITAL ENCOUNTER (OUTPATIENT)
Dept: LAB | Age: 61
Setting detail: SPECIMEN
Discharge: HOME | End: 2021-10-24
Payer: COMMERCIAL

## 2021-10-24 DIAGNOSIS — Z01.818 PREOP TESTING: Primary | ICD-10-CM

## 2021-10-24 PROCEDURE — U0005 INFEC AGEN DETEC AMPLI PROBE: HCPCS

## 2021-10-24 PROCEDURE — U0003 INFECTIOUS AGENT DETECTION BY NUCLEIC ACID (DNA OR RNA); SEVERE ACUTE RESPIRATORY SYNDROME CORONAVIRUS 2 (SARS-COV-2) (CORONAVIRUS DISEASE [COVID-19]), AMPLIFIED PROBE TECHNIQUE, MAKING USE OF HIGH THROUGHPUT TECHNOLOGIES AS DESCRIBED BY CMS-2020-01-R: HCPCS

## 2021-10-26 ENCOUNTER — TELEPHONE (OUTPATIENT)
Dept: PAIN MANAGEMENT | Age: 61
End: 2021-10-26

## 2021-10-26 ENCOUNTER — ANESTHESIA EVENT (OUTPATIENT)
Dept: OPERATING ROOM | Age: 61
End: 2021-10-26
Payer: COMMERCIAL

## 2021-10-26 LAB
SARS-COV-2: NORMAL
SARS-COV-2: NOT DETECTED
SOURCE: NORMAL

## 2021-10-26 NOTE — TELEPHONE ENCOUNTER
Pt called the office to let Dr. Samara Nolan know that his insurance approval letter for the intracept has different levels listed than the levels scheduled. Pt states he will fax a copy to the office within the next hour. Pt also notified that his procedure start time moved up to 9:00 am on 10/28/21 and he will need to arrive an hour and a half earlier.  Pt verbalized understanding

## 2021-10-27 NOTE — TELEPHONE ENCOUNTER
Called patient back and informed him Dr. Brody Pro reviewed his fax and stated his approval is good for the levels that's currently scheduled and he does not need to work about the different levels on the approval letter.  Pt verbalized understanding

## 2021-10-28 ENCOUNTER — HOSPITAL ENCOUNTER (OUTPATIENT)
Age: 61
Setting detail: OUTPATIENT SURGERY
Discharge: HOME | End: 2021-10-28
Attending: ANESTHESIOLOGY | Admitting: ANESTHESIOLOGY
Payer: COMMERCIAL

## 2021-10-28 ENCOUNTER — ANESTHESIA (OUTPATIENT)
Dept: OPERATING ROOM | Age: 61
End: 2021-10-28
Payer: COMMERCIAL

## 2021-10-28 ENCOUNTER — APPOINTMENT (OUTPATIENT)
Dept: GENERAL RADIOLOGY | Age: 61
End: 2021-10-28
Attending: ANESTHESIOLOGY
Payer: COMMERCIAL

## 2021-10-28 VITALS
OXYGEN SATURATION: 96 % | WEIGHT: 175 LBS | SYSTOLIC BLOOD PRESSURE: 156 MMHG | TEMPERATURE: 97.9 F | HEART RATE: 63 BPM | BODY MASS INDEX: 24.5 KG/M2 | RESPIRATION RATE: 14 BRPM | HEIGHT: 71 IN | DIASTOLIC BLOOD PRESSURE: 94 MMHG

## 2021-10-28 VITALS
SYSTOLIC BLOOD PRESSURE: 117 MMHG | DIASTOLIC BLOOD PRESSURE: 62 MMHG | TEMPERATURE: 95.5 F | OXYGEN SATURATION: 99 % | RESPIRATION RATE: 9 BRPM

## 2021-10-28 DIAGNOSIS — M54.89 VERTEBROGENIC PAIN: ICD-10-CM

## 2021-10-28 PROCEDURE — 7100000011 HC PHASE II RECOVERY - ADDTL 15 MIN: Performed by: ANESTHESIOLOGY

## 2021-10-28 PROCEDURE — 6360000002 HC RX W HCPCS: Performed by: ANESTHESIOLOGY

## 2021-10-28 PROCEDURE — 3600000055 HC PAIN LEVEL 3 ADDL 15 MIN: Performed by: ANESTHESIOLOGY

## 2021-10-28 PROCEDURE — 6360000002 HC RX W HCPCS: Performed by: NURSE ANESTHETIST, CERTIFIED REGISTERED

## 2021-10-28 PROCEDURE — 2580000003 HC RX 258: Performed by: ANESTHESIOLOGY

## 2021-10-28 PROCEDURE — 3700000000 HC ANESTHESIA ATTENDED CARE: Performed by: ANESTHESIOLOGY

## 2021-10-28 PROCEDURE — 7100000010 HC PHASE II RECOVERY - FIRST 15 MIN: Performed by: ANESTHESIOLOGY

## 2021-10-28 PROCEDURE — C1889 IMPLANT/INSERT DEVICE, NOC: HCPCS

## 2021-10-28 PROCEDURE — 22899 UNLISTED PROCEDURE SPINE: CPT | Performed by: ANESTHESIOLOGY

## 2021-10-28 PROCEDURE — 3600000054 HC PAIN LEVEL 3 BASE: Performed by: ANESTHESIOLOGY

## 2021-10-28 PROCEDURE — 3700000001 HC ADD 15 MINUTES (ANESTHESIA): Performed by: ANESTHESIOLOGY

## 2021-10-28 PROCEDURE — 2500000003 HC RX 250 WO HCPCS: Performed by: ANESTHESIOLOGY

## 2021-10-28 PROCEDURE — 2500000003 HC RX 250 WO HCPCS: Performed by: NURSE ANESTHETIST, CERTIFIED REGISTERED

## 2021-10-28 PROCEDURE — 2709999900 HC NON-CHARGEABLE SUPPLY: Performed by: ANESTHESIOLOGY

## 2021-10-28 PROCEDURE — 7100000001 HC PACU RECOVERY - ADDTL 15 MIN: Performed by: ANESTHESIOLOGY

## 2021-10-28 PROCEDURE — 3209999900 FLUORO FOR SURGICAL PROCEDURES

## 2021-10-28 PROCEDURE — 7100000000 HC PACU RECOVERY - FIRST 15 MIN: Performed by: ANESTHESIOLOGY

## 2021-10-28 RX ORDER — FENTANYL CITRATE 50 UG/ML
INJECTION, SOLUTION INTRAMUSCULAR; INTRAVENOUS PRN
Status: DISCONTINUED | OUTPATIENT
Start: 2021-10-28 | End: 2021-10-28 | Stop reason: SDUPTHER

## 2021-10-28 RX ORDER — SODIUM CHLORIDE 0.9 % (FLUSH) 0.9 %
10 SYRINGE (ML) INJECTION PRN
Status: DISCONTINUED | OUTPATIENT
Start: 2021-10-28 | End: 2021-10-28 | Stop reason: HOSPADM

## 2021-10-28 RX ORDER — HYDRALAZINE HYDROCHLORIDE 20 MG/ML
5 INJECTION INTRAMUSCULAR; INTRAVENOUS EVERY 10 MIN PRN
Status: DISCONTINUED | OUTPATIENT
Start: 2021-10-28 | End: 2021-10-28 | Stop reason: HOSPADM

## 2021-10-28 RX ORDER — LIDOCAINE HYDROCHLORIDE 20 MG/ML
INJECTION, SOLUTION EPIDURAL; INFILTRATION; INTRACAUDAL; PERINEURAL PRN
Status: DISCONTINUED | OUTPATIENT
Start: 2021-10-28 | End: 2021-10-28 | Stop reason: SDUPTHER

## 2021-10-28 RX ORDER — HYDROMORPHONE HYDROCHLORIDE 1 MG/ML
0.5 INJECTION, SOLUTION INTRAMUSCULAR; INTRAVENOUS; SUBCUTANEOUS EVERY 5 MIN PRN
Status: DISCONTINUED | OUTPATIENT
Start: 2021-10-28 | End: 2021-10-28 | Stop reason: HOSPADM

## 2021-10-28 RX ORDER — OXYCODONE HYDROCHLORIDE AND ACETAMINOPHEN 5; 325 MG/1; MG/1
2 TABLET ORAL PRN
Status: DISCONTINUED | OUTPATIENT
Start: 2021-10-28 | End: 2021-10-28 | Stop reason: HOSPADM

## 2021-10-28 RX ORDER — ONDANSETRON 2 MG/ML
4 INJECTION INTRAMUSCULAR; INTRAVENOUS
Status: DISCONTINUED | OUTPATIENT
Start: 2021-10-28 | End: 2021-10-28 | Stop reason: HOSPADM

## 2021-10-28 RX ORDER — LIDOCAINE HYDROCHLORIDE 10 MG/ML
1 INJECTION, SOLUTION EPIDURAL; INFILTRATION; INTRACAUDAL; PERINEURAL
Status: DISCONTINUED | OUTPATIENT
Start: 2021-10-29 | End: 2021-10-28 | Stop reason: HOSPADM

## 2021-10-28 RX ORDER — SODIUM CHLORIDE 9 MG/ML
25 INJECTION, SOLUTION INTRAVENOUS PRN
Status: DISCONTINUED | OUTPATIENT
Start: 2021-10-28 | End: 2021-10-28 | Stop reason: HOSPADM

## 2021-10-28 RX ORDER — GLYCOPYRROLATE 1 MG/5 ML
SYRINGE (ML) INTRAVENOUS PRN
Status: DISCONTINUED | OUTPATIENT
Start: 2021-10-28 | End: 2021-10-28 | Stop reason: SDUPTHER

## 2021-10-28 RX ORDER — LABETALOL HYDROCHLORIDE 5 MG/ML
5 INJECTION, SOLUTION INTRAVENOUS EVERY 10 MIN PRN
Status: DISCONTINUED | OUTPATIENT
Start: 2021-10-28 | End: 2021-10-28 | Stop reason: HOSPADM

## 2021-10-28 RX ORDER — SODIUM CHLORIDE 0.9 % (FLUSH) 0.9 %
10 SYRINGE (ML) INJECTION EVERY 12 HOURS SCHEDULED
Status: DISCONTINUED | OUTPATIENT
Start: 2021-10-28 | End: 2021-10-28 | Stop reason: HOSPADM

## 2021-10-28 RX ORDER — FENTANYL CITRATE 50 UG/ML
25 INJECTION, SOLUTION INTRAMUSCULAR; INTRAVENOUS EVERY 5 MIN PRN
Status: DISCONTINUED | OUTPATIENT
Start: 2021-10-28 | End: 2021-10-28 | Stop reason: HOSPADM

## 2021-10-28 RX ORDER — SODIUM CHLORIDE 9 MG/ML
INJECTION, SOLUTION INTRAVENOUS CONTINUOUS
Status: DISCONTINUED | OUTPATIENT
Start: 2021-10-29 | End: 2021-10-28

## 2021-10-28 RX ORDER — EPHEDRINE SULFATE/0.9% NACL/PF 50 MG/5 ML
SYRINGE (ML) INTRAVENOUS PRN
Status: DISCONTINUED | OUTPATIENT
Start: 2021-10-28 | End: 2021-10-28 | Stop reason: SDUPTHER

## 2021-10-28 RX ORDER — ONDANSETRON 2 MG/ML
INJECTION INTRAMUSCULAR; INTRAVENOUS PRN
Status: DISCONTINUED | OUTPATIENT
Start: 2021-10-28 | End: 2021-10-28 | Stop reason: SDUPTHER

## 2021-10-28 RX ORDER — BUPIVACAINE HYDROCHLORIDE 5 MG/ML
INJECTION, SOLUTION EPIDURAL; INTRACAUDAL PRN
Status: DISCONTINUED | OUTPATIENT
Start: 2021-10-28 | End: 2021-10-28 | Stop reason: ALTCHOICE

## 2021-10-28 RX ORDER — ROCURONIUM BROMIDE 10 MG/ML
INJECTION, SOLUTION INTRAVENOUS PRN
Status: DISCONTINUED | OUTPATIENT
Start: 2021-10-28 | End: 2021-10-28 | Stop reason: SDUPTHER

## 2021-10-28 RX ORDER — MIDAZOLAM HYDROCHLORIDE 1 MG/ML
INJECTION INTRAMUSCULAR; INTRAVENOUS PRN
Status: DISCONTINUED | OUTPATIENT
Start: 2021-10-28 | End: 2021-10-28 | Stop reason: SDUPTHER

## 2021-10-28 RX ORDER — PROMETHAZINE HYDROCHLORIDE 25 MG/ML
6.25 INJECTION, SOLUTION INTRAMUSCULAR; INTRAVENOUS
Status: DISCONTINUED | OUTPATIENT
Start: 2021-10-28 | End: 2021-10-28 | Stop reason: HOSPADM

## 2021-10-28 RX ORDER — SODIUM CHLORIDE, SODIUM LACTATE, POTASSIUM CHLORIDE, CALCIUM CHLORIDE 600; 310; 30; 20 MG/100ML; MG/100ML; MG/100ML; MG/100ML
INJECTION, SOLUTION INTRAVENOUS CONTINUOUS
Status: DISCONTINUED | OUTPATIENT
Start: 2021-10-29 | End: 2021-10-28 | Stop reason: HOSPADM

## 2021-10-28 RX ORDER — PROPOFOL 10 MG/ML
INJECTION, EMULSION INTRAVENOUS PRN
Status: DISCONTINUED | OUTPATIENT
Start: 2021-10-28 | End: 2021-10-28 | Stop reason: SDUPTHER

## 2021-10-28 RX ORDER — OXYCODONE HYDROCHLORIDE AND ACETAMINOPHEN 5; 325 MG/1; MG/1
1 TABLET ORAL PRN
Status: DISCONTINUED | OUTPATIENT
Start: 2021-10-28 | End: 2021-10-28 | Stop reason: HOSPADM

## 2021-10-28 RX ORDER — DEXAMETHASONE SODIUM PHOSPHATE 10 MG/ML
INJECTION INTRAMUSCULAR; INTRAVENOUS PRN
Status: DISCONTINUED | OUTPATIENT
Start: 2021-10-28 | End: 2021-10-28 | Stop reason: SDUPTHER

## 2021-10-28 RX ADMIN — SODIUM CHLORIDE, POTASSIUM CHLORIDE, SODIUM LACTATE AND CALCIUM CHLORIDE: 600; 310; 30; 20 INJECTION, SOLUTION INTRAVENOUS at 07:55

## 2021-10-28 RX ADMIN — LIDOCAINE HYDROCHLORIDE 80 MG: 20 INJECTION, SOLUTION EPIDURAL; INFILTRATION; INTRACAUDAL; PERINEURAL at 10:55

## 2021-10-28 RX ADMIN — HYDRALAZINE HYDROCHLORIDE 5 MG: 20 INJECTION INTRAMUSCULAR; INTRAVENOUS at 13:11

## 2021-10-28 RX ADMIN — HYDRALAZINE HYDROCHLORIDE 5 MG: 20 INJECTION INTRAMUSCULAR; INTRAVENOUS at 13:31

## 2021-10-28 RX ADMIN — SODIUM CHLORIDE, POTASSIUM CHLORIDE, SODIUM LACTATE AND CALCIUM CHLORIDE: 600; 310; 30; 20 INJECTION, SOLUTION INTRAVENOUS at 11:54

## 2021-10-28 RX ADMIN — DEXAMETHASONE SODIUM PHOSPHATE 10 MG: 10 INJECTION INTRAMUSCULAR; INTRAVENOUS at 11:12

## 2021-10-28 RX ADMIN — Medication 100 MCG: at 10:55

## 2021-10-28 RX ADMIN — SUGAMMADEX 200 MG: 100 INJECTION, SOLUTION INTRAVENOUS at 12:00

## 2021-10-28 RX ADMIN — CEFAZOLIN 2000 MG: 10 INJECTION, POWDER, FOR SOLUTION INTRAVENOUS at 11:04

## 2021-10-28 RX ADMIN — Medication 0.2 MG: at 11:17

## 2021-10-28 RX ADMIN — ONDANSETRON 4 MG: 2 INJECTION, SOLUTION INTRAMUSCULAR; INTRAVENOUS at 11:41

## 2021-10-28 RX ADMIN — ROCURONIUM BROMIDE 50 MG: 10 INJECTION, SOLUTION INTRAVENOUS at 10:55

## 2021-10-28 RX ADMIN — MIDAZOLAM 2 MG: 1 INJECTION INTRAMUSCULAR; INTRAVENOUS at 10:48

## 2021-10-28 RX ADMIN — PROPOFOL 200 MG: 10 INJECTION, EMULSION INTRAVENOUS at 10:55

## 2021-10-28 RX ADMIN — Medication 0.3 MG: at 11:24

## 2021-10-28 RX ADMIN — Medication 10 MG: at 11:26

## 2021-10-28 ASSESSMENT — PULMONARY FUNCTION TESTS
PIF_VALUE: 13
PIF_VALUE: 3
PIF_VALUE: 14
PIF_VALUE: 14
PIF_VALUE: 11
PIF_VALUE: 14
PIF_VALUE: 11
PIF_VALUE: 14
PIF_VALUE: 15
PIF_VALUE: 14
PIF_VALUE: 14
PIF_VALUE: 15
PIF_VALUE: 15
PIF_VALUE: 7
PIF_VALUE: 7
PIF_VALUE: 14
PIF_VALUE: 1
PIF_VALUE: 15
PIF_VALUE: 3
PIF_VALUE: 14
PIF_VALUE: 1
PIF_VALUE: 13
PIF_VALUE: 14
PIF_VALUE: 1
PIF_VALUE: 15
PIF_VALUE: 11
PIF_VALUE: 14
PIF_VALUE: 14
PIF_VALUE: 16
PIF_VALUE: 15
PIF_VALUE: 15
PIF_VALUE: 12
PIF_VALUE: 15
PIF_VALUE: 14
PIF_VALUE: 13
PIF_VALUE: 15
PIF_VALUE: 15
PIF_VALUE: 14
PIF_VALUE: 12
PIF_VALUE: 14
PIF_VALUE: 33
PIF_VALUE: 15
PIF_VALUE: 14
PIF_VALUE: 13
PIF_VALUE: 1
PIF_VALUE: 15
PIF_VALUE: 1
PIF_VALUE: 12
PIF_VALUE: 7
PIF_VALUE: 14
PIF_VALUE: 14
PIF_VALUE: 7
PIF_VALUE: 14
PIF_VALUE: 14
PIF_VALUE: 22
PIF_VALUE: 14
PIF_VALUE: 15
PIF_VALUE: 15
PIF_VALUE: 14
PIF_VALUE: 14
PIF_VALUE: 13
PIF_VALUE: 14
PIF_VALUE: 14
PIF_VALUE: 13
PIF_VALUE: 1
PIF_VALUE: 14
PIF_VALUE: 14
PIF_VALUE: 11
PIF_VALUE: 14
PIF_VALUE: 16

## 2021-10-28 ASSESSMENT — PAIN SCALES - GENERAL
PAINLEVEL_OUTOF10: 0

## 2021-10-28 ASSESSMENT — PAIN DESCRIPTION - DESCRIPTORS: DESCRIPTORS: JABBING

## 2021-10-28 NOTE — ANESTHESIA PRE PROCEDURE
Department of Anesthesiology  Preprocedure Note       Name:  Mack Louise   Age:  63 y.o.  :  1960                                          MRN:  [id number]         Date:  10/28/2021      Surgeon: Alex Sandra):  Henrique Hernandez MD    Procedure: Procedure(s):  BASIVERTEBRAL NERVE NERVE RADIOFREQUENCY ABLATION INTRACEPT PROCEDURE at L4 / L5 / S1    Medications prior to admission:   Prior to Admission medications    Medication Sig Start Date End Date Taking?  Authorizing Provider   mometasone-formoterol [facility]) 200-5 MCG/ACT inhaler Inhale 2 puffs into the lungs 2 times daily 21  Yes Shady Cox,    montelukast (SINGULAIR) 10 MG tablet Take 1 tablet by mouth nightly 21  Yes Stacie Bowels, DO   tadalafil (CIALIS) 5 MG tablet Take 1 tablet by mouth daily 3/29/21  Yes Damien Wyman MD   omeprazole (PRILOSEC) 20 MG delayed release capsule TAKE 1 CAPSULE BY MOUTH 2 TIMES A DAY 21  Yes Luis Barba MD   albuterol sulfate HFA (PROAIR HFA) 108 (90 Base) MCG/ACT inhaler Inhale 2 puffs into the lungs every 6 hours as needed for Wheezing 21   Susanna Cox DO   SUMAtriptan (IMITREX) 100 MG tablet TAKE 1 TABLET BY MOUTH ONCE AS NEEDED FOR MIGRAINE 10/16/19 8/25/21  Luis Barba MD   cromolyn (NASALCROM) 5.2 MG/ACT nasal spray 1 spray by Nasal route 3 times daily 10/14/19   Stacie Bowels, DO       Current medications:    Current Facility-Administered Medications   Medication Dose Route Frequency Provider Last Rate Last Admin    [START ON 10/29/2021] lactated ringers infusion   IntraVENous Continuous Debby Ying  mL/hr at 10/28/21 0755 New Bag at 10/28/21 0755    sodium chloride flush 0.9 % injection 10 mL  10 mL IntraVENous 2 times per day Shawn Pickard DO        sodium chloride flush 0.9 % injection 10 mL  10 mL IntraVENous PRN Debby Ying DO        0.9 % sodium chloride infusion  25 mL IntraVENous PRN Debby Ying DO        [START ON 10/29/2021] lidocaine PF 1 % injection 1 mL  1 mL IntraDERmal Once PRN Mal Karlene, DO           Allergies:     Allergies   Allergen Reactions    Ciprofloxacin Dermatitis       Problem List:    Patient Active Problem List   Diagnosis Code    Right hip pain M25.551    Arthritis of right hip M16.11    Chronic midline low back pain without sciatica M54.50, G89.29    Tendonitis of knee, right M76.891    Acute pain of right knee M25.561    DDD (degenerative disc disease), lumbar M51.36    Vertebrogenic pain M54.89       Past Medical History:        Diagnosis Date    Arthritis     in hip    Asthma     GERD (gastroesophageal reflux disease)        Past Surgical History:        Procedure Laterality Date    COLONOSCOPY  04/06/2018    polyp removed    COLONOSCOPY N/A 4/6/2018    COLONOSCOPY POLYPECTOMY SNARE/COLD BIOPSY performed by Todd Jackson MD at [address] [place] 11/16/2020    RIGHT L4 AND L5 EPIDURAL STEROID INJECTION performed by Tony Juarez MD at [address] [place] [address]         Social History:    Social History     Tobacco Use    Smoking status: Former Smoker     Packs/day: 0.10     Years: 10.00     Pack years: 1.00     Types: Cigars     Quit date: 2011     Years since quitting: 10.8    Smokeless tobacco: Never Used    Tobacco comment: patient states 4 cigars a year    Substance Use Topics    Alcohol use: Yes     Comment: socially                                Counseling given: Not Answered  Comment: patient states 4 cigars a year       Vital Signs (Current):   Vitals:    10/28/21 0739 10/28/21 0739   BP:  (!) 154/84   Pulse:  56   Resp:  16   Temp:  96.9 °F (36.1 °C)   SpO2:  94%   Weight: 175 lb (79.4 kg)    Height: 5' 11\" (1.803 m)                                               BP Readings from Last 3 Encounters:   10/28/21 (!) 154/84   08/30/21 121/77   08/25/21 124/73       NPO Status: Time of last liquid consumption: 2000                        Time of last solid consumption: 1930                        Date of last liquid consumption: 10/27/21                        Date of last solid food consumption: 10/27/21    BMI:   Wt Readings from Last 3 Encounters:   10/28/21 175 lb (79.4 kg)   08/30/21 177 lb (80.3 kg)   06/04/21 177 lb (80.3 kg)     Body mass index is 24.41 kg/m². CBC:   Lab Results   Component Value Date    WBC 7.8 05/24/2017    RBC 5.02 05/24/2017    HGB 15.6 05/24/2017    HCT 46.3 05/24/2017    MCV 92.1 05/24/2017    RDW 13.9 05/24/2017     05/24/2017       CMP:   Lab Results   Component Value Date     05/24/2017    K 4.8 05/24/2017     05/24/2017    CO2 23 05/24/2017    BUN 17 05/24/2017    CREATININE 0.79 05/24/2017    GFRAA >60 05/24/2017    LABGLOM >60 05/24/2017    GLUCOSE 95 09/08/2021    PROT 7.4 05/24/2017    CALCIUM 9.2 05/24/2017    BILITOT 0.72 05/24/2017    ALKPHOS 50 05/24/2017    AST 20 05/24/2017    ALT 24 05/24/2017       POC Tests: No results for input(s): POCGLU, POCNA, POCK, POCCL, POCBUN, POCHEMO, POCHCT in the last 72 hours.     Coags: No results found for: PROTIME, INR, APTT    HCG (If Applicable): No results found for: PREGTESTUR, PREGSERUM, HCG, HCGQUANT     ABGs: No results found for: PHART, PO2ART, BIK7RWD, VPT0RTE, BEART, V2ZEPWDK     Type & Screen (If Applicable):  No results found for: LABABO, LABRH    Drug/Infectious Status (If Applicable):  No results found for: HIV, HEPCAB    COVID-19 Screening (If Applicable):   Lab Results   Component Value Date    COVID19 Not Detected 10/25/2021           Anesthesia Evaluation  Patient summary reviewed and Nursing notes reviewed no history of anesthetic complications:   Airway: Mallampati: II  TM distance: >3 FB   Neck ROM: full  Mouth opening: > = 3 FB Dental: normal exam         Pulmonary:normal exam    (+) asthma:                            Cardiovascular:  Exercise tolerance: good (>4 METS),       (-) past MI and CAD        Rate: normal Neuro/Psych:      (-) TIA and CVA           GI/Hepatic/Renal:   (+) GERD:,           Endo/Other:                     Abdominal:             Vascular: Other Findings:             Anesthesia Plan      general     ASA 2       Induction: intravenous. MIPS: prophylactic pharmacologic antiemetic agents not administered perioperatively for documented reasons. Anesthetic plan and risks discussed with patient. Plan discussed with CRNA.     Attending anesthesiologist reviewed and agrees with Preprocedure content              Sandie Gonzalez DO   10/28/2021

## 2021-10-28 NOTE — H&P
History and Physical Service   [facility] [place] - INPATIENT    HISTORY AND PHYSICAL EXAMINATION            Date of Evaluation: 10/28/2021  Patient name:  Duke Davis  MRN:   [id number]  YOB: 1960  PCP:    Lawrence Fitch MD    History Obtained From:     Patient    History of Present Illness: This is Duke Davis a 63 y.o. male who presents today for a basivertebral nerve radiofrequency ablation Intracept procedure at L4/L5/S1 by Dr. Mel Cordero due to vertebrogenic pain. The patient's chief complaint is 2-3/10 right lower back pain which has progressively worsened over the past 8 years. The pain is aggravated by activity and is minimally relieved with Aleve. Pt denies numbness, tingling, and loss of bowel or urinary function. Prior treatment includes back injections which provided pain relief for a short period of time. Pt denies fevers, chills, chest pain, dyspnea, rashes, open sores, and wounds. Pt denies history of diabetes. Aleve was last taken on 10/24/2021. Past Medical History:     Past Medical History:   Diagnosis Date    Arthritis     in hip    Asthma     GERD (gastroesophageal reflux disease)         Past Surgical History:     Past Surgical History:   Procedure Laterality Date    COLONOSCOPY  04/06/2018    polyp removed    COLONOSCOPY N/A 4/6/2018    COLONOSCOPY POLYPECTOMY SNARE/COLD BIOPSY performed by Liseth Paredes MD at [facility] [address] Right 11/16/2020    RIGHT L4 AND L5 EPIDURAL STEROID INJECTION performed by Mariam Francois MD at [address] [place] [address]          Medications Prior to Admission:     Prior to Admission medications    Medication Sig Start Date End Date Taking?  Authorizing Provider   mometasone-formoterol [facility]) 200-5 MCG/ACT inhaler Inhale 2 puffs into the lungs 2 times daily 6/4/21  Yes Kylie Cox, DO   montelukast (SINGULAIR) 10 MG tablet Take 1 tablet by mouth nightly 6/4/21  Yes Ted Pinks, DO tadalafil (CIALIS) 5 MG tablet Take 1 tablet by mouth daily 3/29/21  Yes Maame Thao MD   omeprazole (PRILOSEC) 20 MG delayed release capsule TAKE 1 CAPSULE BY MOUTH 2 TIMES A DAY 2/24/21  Yes Ezequiel Guzman MD   albuterol sulfate HFA (PROAIR HFA) 108 (90 Base) MCG/ACT inhaler Inhale 2 puffs into the lungs every 6 hours as needed for Wheezing 6/4/21   Olaf Cox DO   SUMAtriptan (IMITREX) 100 MG tablet TAKE 1 TABLET BY MOUTH ONCE AS NEEDED FOR MIGRAINE 10/16/19 8/25/21  Ezequiel Guzman MD   cromolyn (NASALCROM) 5.2 MG/ACT nasal spray 1 spray by Nasal route 3 times daily 10/14/19   Parth Espinal DO        Allergies:     Ciprofloxacin    Social History:     Tobacco:    reports that he quit smoking about 10 years ago. His smoking use included cigars. He has a 1.00 pack-year smoking history. He has never used smokeless tobacco.  Alcohol:      reports current alcohol use. Drug Use:  reports no history of drug use. Family History:     Family History   Problem Relation Age of Onset    No Known Problems Mother     Diabetes Father        Review of Systems:     Positive and Negative as described in HPI. CONSTITUTIONAL: Negative for fevers, chills, sweats, fatigue, and weight loss. HEENT: Pt wears reading glasses. Negative for hearing changes, rhinorrhea, and throat pain. RESPIRATORY: Dry cough for the past 3 years. Controlled asthma. Negative for shortness of breath, congestion, and wheezing. CARDIOVASCULAR: Negative for chest pain, blood clot, irregular heartbeat, and palpitations. GASTROINTESTINAL: GERD. Negative for nausea, vomiting, diarrhea, constipation, change in bowel habits, and abdominal pain. GENITOURINARY: Negative for difficulty of urination, burning with urination, and frequency. INTEGUMENT: Negative for rash, skin lesions, and easy bruising. HEMATOLOGIC/LYMPHATIC: Negative for swelling/edema. ALLERGIC/IMMUNOLOGIC: Negative for urticaria and itching.   ENDOCRINE: egative for increase in drinking, increase in urination, and heat or cold intolerance. MUSCULOSKELETAL: See HPI. NEUROLOGICAL: Occasional headaches. Rare migraines. Negative for dizziness, lightheadedness, numbness, and tingling extremities. BEHAVIOR/PSYCH: Negative for depression and anxiety. Physical Exam:   BP (!) 154/84   Pulse 56   Temp 96.9 °F (36.1 °C)   Resp 16   Ht 5' 11\" (1.803 m)   Wt 175 lb (79.4 kg)   SpO2 94%   BMI 24.41 kg/m²     No results for input(s): POCGLU in the last 72 hours. General Appearance:  Alert, well appearing, and in no acute distress. Mental status: Oriented to person, place, and time. Head: Normocephalic and atraumatic. Eye: No icterus, redness, pupils equal and reactive, extraocular eye movements intact, and conjunctiva clear. Ear: Hearing grossly intact. Nose: No drainage noted. Mouth: Mucous membranes moist.  Neck: Supple and no carotid bruits noted. Lungs: Bilateral equal air entry, clear to auscultation, no wheezing, rales or rhonchi, and normal effort. Cardiovascular: Asymptomatic bradycardia. HR 56 BPM. Regular rhythm. No murmur, gallop, and rub. Abdomen: Soft, nontender, nondistended, and active bowel sounds. Neurologic: Normal speech and cranial nerves II through XII grossly intact. Strength 5/5 bilaterally. Skin: No gross lesions, rashes, bruising, or bleeding on exposed skin area. Extremities: Posterior tibial pulses 2+ bilaterally. No pedal edema. No calf tenderness with palpation. Psych: Normal affect. Investigations:      Laboratory Testing:  No results found for this or any previous visit (from the past 24 hour(s)). No results for input(s): HGB, HCT, WBC, MCV, PLATELET, NA, K, CL, CO2, BUN, CREATININE, GLUCOSE, INR, PROTIME, APTT, AST, ALT, LABALBU, HCG in the last 720 hours. Recent Labs     10/25/21  1220   COVID19       Not Detected     Diagnosis:      1. Vertebrogenic pain    Plans:     1.  Basivertebral nerve radiofrequency ablation Intracept procedure at [address] [place] [address], [place] - CNP  10/28/2021  7:59 AM

## 2021-10-28 NOTE — OP NOTE
Operative Note      Patient: Natasha Shay  YOB: 1960  MRN: [id number]    Date of Procedure: 10/28/2021    Pre-Op Diagnosis: vertebrogenic pain    Post-Op Diagnosis: Same       Procedure(s):  BASIVERTEBRAL NERVE NERVE RADIOFREQUENCY ABLATION INTRACEPT PROCEDURE at L4 / L5 / S1    Surgeon(s):  Taylor Myers MD    Assistant:   * No surgical staff found *    Anesthesia: General    Estimated Blood Loss (mL): Minimal    Complications: None    Specimens:   * No specimens in log *    Implants:  * No implants in log *      Drains: * No LDAs found *    Findings: n/a    Detailed Description of Procedure:   Pre-op Diagnosis:   Vertebrogenic low back pain,   Modic changes L3, L4 AND L5    Post-op Diagnosis:   Vertebrogenic low back pain,   Modic changes L3, L4 AND L5    Procedure: Basivertebral nerve (BVN) ablation- Intracept Procedure L3, L4 AND L5 LEVELS    Physician/Surgeon: Edith Toscano MD  Anesthesia: MAC    ANTIBIOTICS: IV 2 GM ANCEF    Indications for Procedure:   Chronic axial lumbar spinal pain onset several years ago progressively worsened affecting quality of life, refractory to different modalities including therapy medication management and different spine injections  MRI imaging showed Modic changes involving L4 AND L5 lumbar vertebrae  Clinical examination and imaging concordant with vertebrogenic pain    Procedure Time Out: Patient ID confirmed, correct procedure to be performed, correct site and/or side for procedure as per marked location and correct medication(s), including antibiotic to be used for the procedure    Description of Procedure:     After receiving anesthesia in the supine position, the patient was placed prone on the operating room table and all pressure points were appropriately padded. The back was sterilely prepped and draped.      L4 LEVEL:  The C-arm was moved to visualize the target at the superolateral aspect of the L4 vertebral body using the approach similar to the L4 vertebral body. The C-arm was rotated to square off the superior endplate at L4 and rotated approximately 35 degrees to obtain an oblique view with the facet in the midpoint of the vertebral body. The superolateral L4 pedicle was identified, and the skin entry point identified and infiltrated with 0.5 % BUPIVACAINE using a 25-gauge 1-1/2 inch needle. A 22-gauge 5-inch spinal needle was used to anesthetize the track to the pedicle and periosteum and confirm the introducer cannula trajectory. A skin incision was made with 10 scalpel blade. The 8-gauge introducer cannula with daimond tip was then introduced through the skin, subcutaneous tissue and paraspinal muscle until bony contact was made. The position was checked in the AP and Lateral plane. Using a mallet, the trocar was then advanced thru the pedicle to the posterior aspect of the vertebral body using a combination of AP and lateral views to ensure appropriate traversing of the pedicle and no breaching of the pedicle medially. Once the trocar was in the posterior aspect of the L4 vertebral body, the trocar was removed from the cannula and the curved cannula assembly with the nitinol J-stylet was inserted. The wingnut was rotated counterclockwise permitting excursion of the J-stylet. The curved cannula assembly was then advanced using a mallet in 1-2 mm increments. The J-stylet was observed to traverse the vertebral body in the AP and lateral views. Target was reached when the tip of the stylet was noted to be between 30-50% forward of the posterior wall of the L4 in the lateral view (midway between the superior and inferior endplates) and it crossed the midline of the L4 spinous process in the AP view. The stylet was then removed. The bipolar radiofrequency (RF) probe was removed from the previous vertebral body, cleaned and then inserted into the introducer cannula.  The wingnut was rotated clockwise to retract the PEEK sleeve to expose the proximal electrode on the radiofrequency probe. The BVN nerve was then ablated at 85 degrees Celsius for 15 minutes using [employer]s RFG standard algorithm. L5 LEVEL  While the ablation was occurring at L5, the C-arm was moved to visualize the target at the superolateral aspect of the L5 vertebral body using theapproach similar to the L5 vertebral body. The C-arm was rotated to square off the superior endplate at L5 and rotated approximately 35 degrees to the to obtain an oblique view with the facet in the midpoint of the vertebral body. The superolateral  L5 pedicle was identified, and the skin entry point identified and infiltrated with 1% lidocaine using a 25-gauge 1-1/2 inch needle. A 22-gauge 5-inch spinal needle was used to anesthetize the track to the pedicle and periosteum and confirm the introducer cannula trajectory. A skin incision was made with 10 scalpel blade. The 8-gauge introducer cannula with matt tip was then introduced through the skin, subcutaneous tissue and paraspinal muscle until bony contact was made. The position was checked in the AP and Lateral plane. Using a mallet, the trocar was then advanced thru the pedicle to the posterior aspect of the vertebral body using a combination of AP and lateral views to ensure appropriate traversing of the pedicle and no breaching of the pedicle medially. Once the trocar was in the posterior aspect of the l5 vertebral body, the trocar was removed from the cannula and the curved cannula assembly with the nitinol J-stylet was inserted. The wingnut was rotated counterclockwise permitting excursion of the J-stylet. The curved cannula assembly was then advanced using a mallet in 1-2 mm increments. The J-stylet was observed to traverse the vertebral body in the midline in both the AP and lateral views.  Target was reached when the tip of the stylet was noted to be between 30-50% forward of the posterior wall of the L5 in the lateral view (midway between the superior and

## 2021-11-01 ENCOUNTER — OFFICE VISIT (OUTPATIENT)
Dept: FAMILY MEDICINE CLINIC | Age: 61
End: 2021-11-01
Payer: COMMERCIAL

## 2021-11-01 VITALS
WEIGHT: 182.8 LBS | TEMPERATURE: 97.4 F | BODY MASS INDEX: 25.5 KG/M2 | DIASTOLIC BLOOD PRESSURE: 89 MMHG | OXYGEN SATURATION: 97 % | HEART RATE: 64 BPM | SYSTOLIC BLOOD PRESSURE: 155 MMHG

## 2021-11-01 DIAGNOSIS — R03.0 ELEVATED BLOOD PRESSURE READING: Primary | ICD-10-CM

## 2021-11-01 PROCEDURE — 99213 OFFICE O/P EST LOW 20 MIN: CPT | Performed by: FAMILY MEDICINE

## 2021-11-01 RX ORDER — BLOOD PRESSURE TEST KIT
1 KIT MISCELLANEOUS DAILY
Qty: 1 KIT | Refills: 0 | Status: SHIPPED | OUTPATIENT
Start: 2021-11-01

## 2021-11-01 RX ORDER — RAMIPRIL 1.25 MG/1
1.25 CAPSULE ORAL DAILY
Qty: 30 CAPSULE | Refills: 3 | Status: SHIPPED | OUTPATIENT
Start: 2021-11-01 | End: 2021-11-30 | Stop reason: SDUPTHER

## 2021-11-01 SDOH — ECONOMIC STABILITY: FOOD INSECURITY: WITHIN THE PAST 12 MONTHS, THE FOOD YOU BOUGHT JUST DIDN'T LAST AND YOU DIDN'T HAVE MONEY TO GET MORE.: NEVER TRUE

## 2021-11-01 SDOH — ECONOMIC STABILITY: FOOD INSECURITY: WITHIN THE PAST 12 MONTHS, YOU WORRIED THAT YOUR FOOD WOULD RUN OUT BEFORE YOU GOT MONEY TO BUY MORE.: NEVER TRUE

## 2021-11-01 ASSESSMENT — PATIENT HEALTH QUESTIONNAIRE - PHQ9
SUM OF ALL RESPONSES TO PHQ9 QUESTIONS 1 & 2: 0
2. FEELING DOWN, DEPRESSED OR HOPELESS: 0
SUM OF ALL RESPONSES TO PHQ QUESTIONS 1-9: 0
1. LITTLE INTEREST OR PLEASURE IN DOING THINGS: 0
SUM OF ALL RESPONSES TO PHQ QUESTIONS 1-9: 0
SUM OF ALL RESPONSES TO PHQ QUESTIONS 1-9: 0

## 2021-11-01 ASSESSMENT — SOCIAL DETERMINANTS OF HEALTH (SDOH): HOW HARD IS IT FOR YOU TO PAY FOR THE VERY BASICS LIKE FOOD, HOUSING, MEDICAL CARE, AND HEATING?: NOT HARD AT ALL

## 2021-11-01 NOTE — PROGRESS NOTES
General FM note    Frank Mary is a 63 y.o. male who presents today for follow up on his  medical conditions as noted below.   Frank Mary is c/o of   Chief Complaint   Patient presents with    Blood Pressure Check     f/u to procedure 10/28/21       Patient Active Problem List:     Right hip pain     Arthritis of right hip     Chronic midline low back pain without sciatica     Tendonitis of knee, right     Acute pain of right knee     DDD (degenerative disc disease), lumbar     Vertebrogenic pain     Past Medical History:   Diagnosis Date    Arthritis     in hip    Asthma     GERD (gastroesophageal reflux disease)       Past Surgical History:   Procedure Laterality Date    COLONOSCOPY  04/06/2018    polyp removed    COLONOSCOPY N/A 4/6/2018    COLONOSCOPY POLYPECTOMY SNARE/COLD BIOPSY performed by Linda Chan MD at [address] [place] [address] Right 11/16/2020    RIGHT L4 AND L5 EPIDURAL STEROID INJECTION performed by Ana Luisa Dill MD at [address] [place] [address] N/A 10/28/2021    BASIVERTEBRAL NERVE NERVE RADIOFREQUENCY ABLATION INTRACEPT PROCEDURE at L4 / L5 / S1 performed by Ana Luisa Dill MD at [address] [place] [address]       Family History   Problem Relation Age of Onset    No Known Problems Mother     Diabetes Father      Current Outpatient Medications   Medication Sig Dispense Refill    ramipril (ALTACE) 1.25 MG capsule Take 1 capsule by mouth daily 30 capsule 3    Blood Pressure KIT 1 each by Does not apply route daily 1 kit 0    albuterol sulfate HFA (PROAIR HFA) 108 (90 Base) MCG/ACT inhaler Inhale 2 puffs into the lungs every 6 hours as needed for Wheezing 3 Inhaler 3    mometasone-formoterol (DULERA) 200-5 MCG/ACT inhaler Inhale 2 puffs into the lungs 2 times daily 3 Inhaler 3    montelukast (SINGULAIR) 10 MG tablet Take 1 tablet by mouth nightly 90 tablet 3    tadalafil (CIALIS) 5 MG tablet Take 1 tablet by mouth daily 90 tablet 1  omeprazole (PRILOSEC) 20 MG delayed release capsule TAKE 1 CAPSULE BY MOUTH 2 TIMES A  capsule 1    cromolyn (NASALCROM) 5.2 MG/ACT nasal spray 1 spray by Nasal route 3 times daily 1 Bottle 3    SUMAtriptan (IMITREX) 100 MG tablet TAKE 1 TABLET BY MOUTH ONCE AS NEEDED FOR MIGRAINE 9 tablet 0     No current facility-administered medications for this visit. ALLERGIES:    Allergies   Allergen Reactions    Ciprofloxacin Dermatitis       Social History     Tobacco Use    Smoking status: Former Smoker     Packs/day: 0.10     Years: 10.00     Pack years: 1.00     Types: Cigars     Quit date: 2011     Years since quitting: 10.8    Smokeless tobacco: Never Used    Tobacco comment: patient states 4 cigars a year    Substance Use Topics    Alcohol use: Yes     Comment: socially      Body mass index is 25.5 kg/m². BP (!) 155/89   Pulse 64   Temp 97.4 °F (36.3 °C)   Wt 182 lb 12.8 oz (82.9 kg)   SpO2 97%   BMI 25.50 kg/m²     Subjective:      HPI    63 y.o. male coming in today with concerns about elevated blood pressure. he states over the last couple days his blood pressure was quite elevated. He was seen for procedure/nerve ablation in the hospital and the blood pressure was high there. They gave him some medication she was told to follow-up. He tells me that he did not change any of his diet and exercises. He denies any chest pains palpitations. His recent blood work including cholesterol panel was normal.    Review of Systems   Constitutional: Negative for fever and unexpected weight change. Pertinent items are noted in HPI. Objective:   Physical Exam  Constitutional: VS (see above). General appearance: normal development, habitus and attention, no deformities. No distress. Eyes: normal conjunctiva and lids. CAV: RRR, no RMG. No edema lower extremities. Pulmo: CTA bilateral, no CWR. Skin: no rashes, lesions or ulcers. Musculoskeletal: normal gait.  Nails: no clubbing or cyanosis. Psychiatric: alert and oriented to place, time and person. Normal mood and affect. Assessment:       Diagnosis Orders   1. Elevated blood pressure reading  Blood Pressure KIT       Plan:   Patient's blood pressure is quite elevated even with rechecking. I will start him on a very low-dose of a ramipril. He will keep me up with blood pressure readings. If the blood pressure will come down he will take an additional pill/in total 2.5 mg of the ramipril. He will call the office. And then will bump up the milligram if needed. Return in about 6 months (around 2022), or if symptoms worsen or fail to improve, for HTN. No orders of the defined types were placed in this encounter. Orders Placed This Encounter   Medications    ramipril (ALTACE) 1.25 MG capsule     Sig: Take 1 capsule by mouth daily     Dispense:  30 capsule     Refill:  3    Blood Pressure KIT     Si each by Does not apply route daily     Dispense:  1 kit     Refill:  0       Call or return to clinic prn if these symptoms worsen or fail to improve as anticipated. I have reviewed the instructions with the patient, answering all questions to patient's satisfaction. Blake Nieves received counseling on the following healthy behaviors: nutrition, exercise, and medication adherence  Reviewed prior labs and health maintenance. Continue current medications, diet and exercise. Discussed use, benefit, and side effects of prescribed medications. Barriers to medication compliance addressed. Patient given educational materials - see patient instructions. All patient questions answered. Patient voiced understanding.       Electronically signed by Kailee Serrato MD on 2021 at 2:03 PM       (Please note that portions of this note were completed with a voice recognition program. Efforts were made to edit the dictations but occasionally words are mis-transcribed.)

## 2021-11-05 ENCOUNTER — OFFICE VISIT (OUTPATIENT)
Dept: PAIN MANAGEMENT | Age: 61
End: 2021-11-05
Payer: COMMERCIAL

## 2021-11-05 VITALS
BODY MASS INDEX: 25.5 KG/M2 | OXYGEN SATURATION: 96 % | SYSTOLIC BLOOD PRESSURE: 123 MMHG | HEART RATE: 64 BPM | HEIGHT: 71 IN | DIASTOLIC BLOOD PRESSURE: 74 MMHG

## 2021-11-05 DIAGNOSIS — G89.29 CHRONIC MIDLINE LOW BACK PAIN WITHOUT SCIATICA: Primary | ICD-10-CM

## 2021-11-05 DIAGNOSIS — M51.36 DDD (DEGENERATIVE DISC DISEASE), LUMBAR: Chronic | ICD-10-CM

## 2021-11-05 DIAGNOSIS — M54.50 CHRONIC MIDLINE LOW BACK PAIN WITHOUT SCIATICA: Primary | ICD-10-CM

## 2021-11-05 PROCEDURE — 99213 OFFICE O/P EST LOW 20 MIN: CPT | Performed by: NURSE PRACTITIONER

## 2021-11-05 RX ORDER — METHOCARBAMOL 500 MG/1
TABLET, FILM COATED ORAL
COMMUNITY
Start: 2021-10-28

## 2021-11-05 RX ORDER — HYDROCODONE BITARTRATE AND ACETAMINOPHEN 5; 325 MG/1; MG/1
TABLET ORAL
COMMUNITY
Start: 2021-10-28 | End: 2021-11-05

## 2021-11-05 ASSESSMENT — ENCOUNTER SYMPTOMS
SHORTNESS OF BREATH: 0
CONSTIPATION: 0
BACK PAIN: 1
COUGH: 1

## 2021-11-05 NOTE — PROGRESS NOTES
Patient is here today to review medication contract. Chief Complaint   Patient presents with    Back Pain    Post-Op Check         PMH     Back pain  Pt reports axial back pain with no radiation in leg  No associated numbness and paresthesia  Onset more than 1 year ago  Describes it as constant aching pain that fluctuates with intensity depending upon the activity level affecting quality of life  Pain aggravated with forward bending and axial loading  No changes in bladder or bowel control     Patient completed therapy without any improvement  He tried NSAIDs and muscle relaxers   He tried different spine interventional procedures with little relief     Lumbar spine MRI showed no surgical pathology  Showed L4-L5 and S1 level endplate degenerative changes with Modic changes in the vertebrae's    10/28/21 Pt basivertebral nerve ablation and today reports almost complete relief from pain. Able to run 2 miles without difficulty but was sore later that night     HPI:   Back Pain  This is a chronic problem. The current episode started more than 1 year ago. The problem occurs intermittently. The problem has been waxing and waning since onset. The quality of the pain is described as aching. The pain does not radiate. The pain is at a severity of 2/10. Pertinent negatives include no chest pain, fever, numbness, paresthesias or weakness. S/P: BASIVERTEBRAL NERVE NERVE RADIOFREQUENCY ABLATION INTRACEPT PROCEDURE at L4 / L5 / S1 DOS 10/28/21    Outcome   Any improvement of activity?   Yes    Any side effects (appetite,leg cramping,facial  fleshing):none   Increase of pain:  No  Pain score Today:  2   No results found for: LABA1C  No results found for: EAG               Past Medical History:   Diagnosis Date    Arthritis     in hip    Asthma     GERD (gastroesophageal reflux disease)        Past Surgical History:   Procedure Laterality Date    COLONOSCOPY  04/06/2018    polyp removed    COLONOSCOPY N/A 4/6/2018 COLONOSCOPY POLYPECTOMY SNARE/COLD BIOPSY performed by Beto Wray MD at [place] [id number] Right 11/16/2020    RIGHT L4 AND L5 EPIDURAL STEROID INJECTION performed by Sarah Simmons MD at [place] [id number] N/A 10/28/2021    BASIVERTEBRAL NERVE NERVE RADIOFREQUENCY ABLATION INTRACEPT PROCEDURE at L4 / L5 / S1 performed by Sarah Simmons MD at [address] [place] [address] VASECTOMY         Allergies   Allergen Reactions    Ciprofloxacin Dermatitis         Current Outpatient Medications:     ramipril (ALTACE) 1.25 MG capsule, Take 1 capsule by mouth daily, Disp: 30 capsule, Rfl: 3    Blood Pressure KIT, 1 each by Does not apply route daily, Disp: 1 kit, Rfl: 0    albuterol sulfate HFA (PROAIR HFA) 108 (90 Base) MCG/ACT inhaler, Inhale 2 puffs into the lungs every 6 hours as needed for Wheezing, Disp: 3 Inhaler, Rfl: 3    mometasone-formoterol (DULERA) 200-5 MCG/ACT inhaler, Inhale 2 puffs into the lungs 2 times daily, Disp: 3 Inhaler, Rfl: 3    montelukast (SINGULAIR) 10 MG tablet, Take 1 tablet by mouth nightly, Disp: 90 tablet, Rfl: 3    tadalafil (CIALIS) 5 MG tablet, Take 1 tablet by mouth daily, Disp: 90 tablet, Rfl: 1    omeprazole (PRILOSEC) 20 MG delayed release capsule, TAKE 1 CAPSULE BY MOUTH 2 TIMES A DAY, Disp: 180 capsule, Rfl: 1    SUMAtriptan (IMITREX) 100 MG tablet, TAKE 1 TABLET BY MOUTH ONCE AS NEEDED FOR MIGRAINE, Disp: 9 tablet, Rfl: 0    methocarbamol (ROBAXIN) 500 MG tablet, , Disp: , Rfl:     Family History   Problem Relation Age of Onset    No Known Problems Mother     Diabetes Father        Social History     Socioeconomic History    Marital status:      Spouse name: Not on file    Number of children: Not on file    Years of education: Not on file    Highest education level: Not on file   Occupational History    Not on file   Tobacco Use    Smoking status: Former Smoker     Packs/day: 0.10     Years: 10.00 Pack years: 1.00     Types: Cigars     Quit date: 2011     Years since quitting: 10.8    Smokeless tobacco: Never Used    Tobacco comment: patient states 4 cigars a year    Vaping Use    Vaping Use: Never used   Substance and Sexual Activity    Alcohol use: Yes     Comment: socially    Drug use: No    Sexual activity: Yes   Other Topics Concern    Not on file   Social History Narrative    Not on file     Social Determinants of Health     Financial Resource Strain: Low Risk     Difficulty of Paying Living Expenses: Not hard at all   Food Insecurity: No Food Insecurity    Worried About Running Out of Food in the Last Year: Never true    Sandy of Food in the Last Year: Never true   Transportation Needs:     Lack of Transportation (Medical):  Lack of Transportation (Non-Medical):    Physical Activity:     Days of Exercise per Week:     Minutes of Exercise per Session:    Stress:     Feeling of Stress :    Social Connections:     Frequency of Communication with Friends and Family:     Frequency of Social Gatherings with Friends and Family:     Attends [employer] Services:     Active Member of Clubs or Organizations:     Attends Club or Organization Meetings:     Marital Status:    Intimate Partner Violence:     Fear of Current or Ex-Partner:     Emotionally Abused:     Physically Abused:     Sexually Abused:        Review of Systems:  Review of Systems   Constitutional: Negative. Negative for chills and fever. Cardiovascular: Negative for chest pain. Respiratory: Positive for cough. Negative for shortness of breath. Musculoskeletal: Positive for back pain. Gastrointestinal: Negative for constipation. Neurological: Negative for numbness, paresthesias and weakness. Physical Exam:  /74   Pulse 64   Ht 5' 11\" (1.803 m)   SpO2 96%   BMI 25.50 kg/m²     Physical Exam  Cardiovascular:      Rate and Rhythm: Normal rate.    Pulmonary:      Effort: Pulmonary effort is normal. Musculoskeletal:         General: Normal range of motion. Back:    Skin:     General: Skin is warm and dry. Neurological:      Mental Status: He is alert and oriented to person, place, and time.             Assessment:  Problem List Items Addressed This Visit     DDD (degenerative disc disease), lumbar (Chronic)    Relevant Medications    methocarbamol (ROBAXIN) 500 MG tablet    Chronic midline low back pain without sciatica - Primary    Relevant Medications    methocarbamol (ROBAXIN) 500 MG tablet             Treatment Plan:  Patient relates significant relief with recent procedure  Continue to increase activity as tolerated  Will call office if pain worsens

## 2021-11-30 ENCOUNTER — NURSE ONLY (OUTPATIENT)
Dept: FAMILY MEDICINE CLINIC | Age: 61
End: 2021-11-30
Payer: COMMERCIAL

## 2021-11-30 DIAGNOSIS — Z23 NEED FOR INFLUENZA VACCINATION: Primary | ICD-10-CM

## 2021-11-30 PROCEDURE — 90471 IMMUNIZATION ADMIN: CPT | Performed by: FAMILY MEDICINE

## 2021-11-30 PROCEDURE — 90686 IIV4 VACC NO PRSV 0.5 ML IM: CPT | Performed by: FAMILY MEDICINE

## 2021-11-30 RX ORDER — RAMIPRIL 1.25 MG/1
1.25 CAPSULE ORAL DAILY
Qty: 30 CAPSULE | Refills: 3 | Status: SHIPPED | OUTPATIENT
Start: 2021-11-30 | End: 2022-04-04

## 2021-11-30 NOTE — TELEPHONE ENCOUNTER
[facility] is calling to request a refill on the following medication(s):    Last Visit Date (If Applicable):  86/0/4272    Next Visit Date:    Visit date not found    Medication Request:  Requested Prescriptions     Pending Prescriptions Disp Refills    ramipril (ALTACE) 1.25 MG capsule 30 capsule 3     Sig: Take 1 capsule by mouth daily

## 2022-01-21 ENCOUNTER — PATIENT MESSAGE (OUTPATIENT)
Dept: FAMILY MEDICINE CLINIC | Age: 62
End: 2022-01-21

## 2022-01-24 NOTE — TELEPHONE ENCOUNTER
From: Joyce Verduzco  To: Dr. Mikki Torres  Sent: 1/21/2022 2:17 PM EST  Subject: Cough    Hi Dr, I have had the cough now for over 3 years. About 5-6 weeks ago I had a cold and the cough seems to have gotten worse. There is some mucus with it. Also, there is a tickle in the back of me throat which causes the cough to be more severe. I just cant seem to get rid of the tickle. I did try cough medicine, but did not seem to work. Do you have any suggestions?     Thank you,  Pamela Hensley

## 2022-01-25 DIAGNOSIS — J45.51 ASTHMA, SEVERE PERSISTENT, POORLY-CONTROLLED, WITH ACUTE EXACERBATION: ICD-10-CM

## 2022-01-26 RX ORDER — FLUTICASONE PROPIONATE 50 MCG
1 SPRAY, SUSPENSION (ML) NASAL DAILY
Qty: 32 G | Refills: 3 | Status: SHIPPED | OUTPATIENT
Start: 2022-01-26 | End: 2022-05-05 | Stop reason: SDUPTHER

## 2022-01-26 RX ORDER — MONTELUKAST SODIUM 10 MG/1
10 TABLET ORAL NIGHTLY
Qty: 90 TABLET | Refills: 3 | Status: SHIPPED | OUTPATIENT
Start: 2022-01-26

## 2022-02-16 RX ORDER — OMEPRAZOLE 20 MG/1
CAPSULE, DELAYED RELEASE ORAL
Qty: 180 CAPSULE | Refills: 1 | Status: SHIPPED | OUTPATIENT
Start: 2022-02-16

## 2022-03-08 ENCOUNTER — PATIENT MESSAGE (OUTPATIENT)
Dept: FAMILY MEDICINE CLINIC | Age: 62
End: 2022-03-08

## 2022-03-08 DIAGNOSIS — S86.819A STRAIN OF CALF MUSCLE, UNSPECIFIED LATERALITY, INITIAL ENCOUNTER: Primary | ICD-10-CM

## 2022-03-08 NOTE — TELEPHONE ENCOUNTER
From: Jigna Anderson  To: Dr. Clair Baxter  Sent: 3/8/2022 7:06 AM EST  Subject: PT    Good morning, I strained my calf running and I think I need physical therapy again. Could you write a prescription for me? I usually see Rena Pleitez . She is my physical therapist with [facility].    Thank you,  Krish Garcia

## 2022-03-14 ENCOUNTER — HOSPITAL ENCOUNTER (OUTPATIENT)
Dept: PHYSICAL THERAPY | Facility: CLINIC | Age: 62
Setting detail: THERAPIES SERIES
Discharge: HOME | End: 2022-03-14
Payer: COMMERCIAL

## 2022-03-14 PROCEDURE — 97140 MANUAL THERAPY 1/> REGIONS: CPT

## 2022-03-14 PROCEDURE — 97110 THERAPEUTIC EXERCISES: CPT

## 2022-03-14 PROCEDURE — 97161 PT EVAL LOW COMPLEX 20 MIN: CPT

## 2022-03-14 RX ORDER — SUMATRIPTAN 100 MG/1
TABLET, FILM COATED ORAL
Qty: 9 TABLET | Refills: 0 | Status: SHIPPED | OUTPATIENT
Start: 2022-03-14

## 2022-03-14 NOTE — CONSULTS
[] [address]   Outpatient Rehabilitation &  Therapy  [address]  P: [phone]  F: [phone] [] [address]  P: [phone]  F: [phone] [x] [address]  [facility]   Bessie Daunt: [phone]  F: [phone]         Physical Therapy Running Evaluation    Date:  3/14/2022  Patient: Dash Das   : 1960  MRN: [id number]  Physician: Dr. Buenrostro: Medical [place] 30 visit hard max  Medical Diagnosis:  Calf strain  Rehab Codes: M79.661  Onset date:  22   Next 's appt.:prn    Subjective:   CC:Pt reports he was out for an easy long run on 22 and R calf started to cramp. Was able to stop, rest and stretch and resume the run and it started to feel better but then it happened again. Let it rest a few days then tried a run/walk and was able to complete 1'on1'off x12 cycles. Tried again 2 other times and just rode bike between and each time it had felt progressively worse and now haven't run since last week on Wed. No longer having pain with walking. Have not tried running again because wanted to get it checked and work on it before I have to have extended time off. Tried rest, stretch and theragun with some improvement but not all better.   PMHx: [] Unremarkable [] Diabetes [] HTN  [] Pacemaker   [] MI/Heart Problems [] Cancer [] Arthritis  [] Other:              [x] Refer to full medical chart  In EPIC     Tests: [] X-Ray: [] MRI:  [x] none:     Medications: [x] Refer to full medical record [] None [] Other:  Allergies:      [x] Refer to full medical record [] None [] Other:    Working:  [x] Normal Duty  [] Light Duty  [] Off D/T Condition  [] Retired    [] Not Employed    []  Disability  [] Other:           Return to work:     Job/ADL Description:  [address]  Next goal race:  in  weeks    Pain:  [] Yes  [x] No   Location:  R calf Pain Rating: (0-10 scale) 0/10 today but have not run on it and just walking no longer having a pain or tightness        1-2/10 with running  Pain altered Tx:  [] Yes  [x] No  Action:  Symptoms:  [x] Improving [] Worsening [] Same  Better:  [] Meds    [] Ice pack    [] Sit    [x] Not running  []Stand    [] Walk    [] Stretching   [] Other:  Worse: [x] Run    [] Easy    [] Speed work    []Stand    [] Walk    [] Stairs    [] Sit    [] Other:  Sleep: [x] OK    [] Disturbed    Objective:    ROM  ° A/P STRENGTH    Left Right Left Right   Hip Flex WNL WNL     Ext 10 10 4- 4-   ER 20 20 5 5   ABD WNL WNL 4+ 4   Knee Flex WNL WNL 5 5   Ext WNL WNL     Ankle DF 5 5     PF WNL WNL     INV WNL WNL     EVER WNL WNL     GTE WNL WNL         OBSERVATION No Deficit Deficit Not Tested Comments   Posture       Forward Head [] [x] []    Rounded Shoulders [] [x] []    Kyphosis [] [x] []    Palpation [] [x] [] R medial and lateral head of gastroc spasm and tenderness, hip flexor, glute med and piriformis   Sensation [x] [] []    Gait [] [x] [] Analysis:  Early heel off            FUNCTIONAL TESTS PAIN NO PAIN COMMENTS   1 legged squat [] [x]    Posterior tibialis dysfunction [] [x] + Post  tib dysfunction    # of 1 legged calf raises L14 R8 No pain just fatigue       Functional Test: UWRI  Score: 86 % functionally impaired     Comments:  Assessment:Patient would benefit from skilled physical therapy services in order to: Increase hip extension and ankle DF as well as increase calf and hip strength to allow for normal LE mechanics and return t0 usual training for 1/2 marathon  Problems:    [x] ? Pain:     [x] ? ROM:    [x] ? Strength:    [x] ? Function: Not able to run as he wishes   [] ?  Balance  [] Increased edema:  [] Postural Deviations  [x] Gait Deviations  [x]  UWRI   [] Other:      STG: (to be met in 5 treatments)  1. ? Pain:<3/10 pain R calf with running to allow pt to continue to train  2. ? ROM:Normal hip extension and 10 deg ankle DF with knee straight to allow for normal LE mechanics  3. ? Strength: 5/5 hip strength to aide in running mechanics and reduce load on calf during running   4. ? Function: Able to run 7 miles with <3/10 pain R calf   5. Independent with Home Exercise Programs    LTG: (to be met in 10 treatments)  1. No pain R calf to allow pt to run 9 miles without compensation  2. Able to perform single leg HR 20 reps on each side through full ROM   3. UWRI<20% limitation  4. Able to run as he wishes and be able to run 1/2 marathon                 Patient goals:Be able to run painfree and do GC 1/2 in 6 weeks    Rehab Potential:  [x] Good  [] Fair  [] Poor   Suggested Professional Referral:  [x] No  [] Yes:  Barriers to Goal Achievement[de-identified]  [x] No  [] Yes:  Domestic Concerns:  [x] No  [] Yes:    Pt. Education:  [x] Plans/Goals, Risks/Benefits discussed  [x] Home exercise program    Method of Education: [x] Verbal  [x] Demo  [x] Written  Comprehension of Education:  [x] Verbalizes understanding. [] Demonstrates understanding. [] Needs Review. [] Demonstrates/verbalizes understanding of HEP/Ed previously given.     Treatment Plan:  [x] Therapeutic Exercise    [x] Therapeutic Activity  [x] Manual Therapy   [x] Alter G treadmill  [x] Phys perf test     [x] Vasocompression/Game Ready   [x] Neuromuscular Re-education [x] Instruction in HEP                                Frequency:  1-2x/week for 10 visits    Todays Treatment:  Manual: DI hip flexor proximal and distal, piriformis, glute med, Hypervolt medial and lateral head R gastroc, TFL and quad  Precautions: standard  Exercises:  Exercise Reps/ Time Weight/ Level Issued for HEP  Comments   Prone        Flying squirrels 2x10  x x    Hip ext (glut max)        Supine        Hip flexor stretch on foam roller 2'ea  x x    Eccentric 1 legged bridges 2x10  x x    Bridge marches        Sidelying        Clams 90/30 deg        [facility] hip abd        Gym        Fig 8 banded HR 2x10 lime x x    Eccentric HR 2x10  x x    Toe yoga reviewed  x     Soleus HR from split squat position 2x10  x x    Tippy bird        Monster walks        Hip thrusts        Step downs     Posterior and lateral   Posterior sling ex     On cable column   Ski jumper lunges        Functional reach        Reverse twisting lunge        RDLs     Retract scaps, one hand over, one hand under   Resisted C skip      Neutral L spine   Front squats     No L ext   Resisted Push Press        Ninja jumps     Soft landings on box; jump up/step down   Depth drops        Depth jumps     Jump down w/ low jose   Med ball cleans     Start on chest, elbows wide   Other:    Specific Instructions for next treatment:  1. Standing glute strengthening  2. Recheck hip and calves  3.run if calf sx's allow    Treatment Charges: Mins Units   [x] Evaluation       [x]  Low       []  Moderate       []  High 15 1   [] Phys perf test     [x]  Ther Exercise 30 2   [x]  Manual Therapy 15 1   []  Ther Activities     []  Aquatics     []  Vasocompression     []  NMR       TOTAL TREATMENT TIME:     Time in: 1963  Time Out:1407    Electronically signed by: Juan Dash PT        Physician Signature:________________________________Date:__________________  By signing above or cosigning this note, I have reviewed this plan of care and certify a need for medically necessary rehabilitation services.      *PLEASE SIGN ABOVE AND FAX BACK ALL PAGES*

## 2022-03-16 ENCOUNTER — HOSPITAL ENCOUNTER (OUTPATIENT)
Dept: PHYSICAL THERAPY | Facility: CLINIC | Age: 62
Setting detail: THERAPIES SERIES
Discharge: HOME | End: 2022-03-16
Payer: COMMERCIAL

## 2022-03-16 PROCEDURE — 97140 MANUAL THERAPY 1/> REGIONS: CPT

## 2022-03-16 PROCEDURE — 97110 THERAPEUTIC EXERCISES: CPT

## 2022-03-16 NOTE — FLOWSHEET NOTE
[x] [facility]  Outpatient Rehabilitation &  Therapy  [facility]   [place]: [phone]  F: [phone]      Physical Therapy Daily Treatment Note    Date:  3/16/2022  Patient Name:  Asmita Damian    :  1960  MRN: [id number]  Physician: Dr. Flores Gibson: Medical [place] 30 visit hard max  Medical Diagnosis:   Calf strain                    Rehab Codes: M79.661  Onset date:    22                                    Next Dr's appt.:prn  Visit# / total visits:2/10     Cancels/No Shows:0    Subjective:    Pain:  [x] Yes  [] No Location: R calf Pain Rating: (0-10 scale) 8/10 trying to run  Pain altered Tx:  [x] No  [] Yes  Action:  Comments: Only made it 2 cycles of run/walk with 1' interval on the run before calf pain started and had to walk the rest of the way home.  Calf feels fine today but pain was severe on the run  Objective:  Manual: DI hip flexor proximal and distal, piriformis, glute med, Hypervolt medial and lateral head R gastroc, TFL and quad  Precautions: standard  Exercises:  Exercise Reps/ Time Weight/ Level Issued for HEP   Comments   Prone             Flying squirrels 2x10   x      Hip ext (glut max)             Supine             Hip flexor stretch on foam roller 2'ea   x x     Eccentric 1 legged bridges 2x10   x      Bridge marches             Sidelying             Clams 90/30 deg             Angelica hip abd             Gym             Fig 8 banded HR 2x10 lime x      Eccentric HR 2x10   x x     Toe yoga reviewed   x       Soleus HR from split squat position 2x10   x x     Reverse Lunge  2x10    x  x     Monster walks   2 laps  Blue  x  x     Hip thrusts             Step downs  2x10  4\"  x  x Fwd and lateral   Posterior sling ex         On cable column   Ski jumper lunges             Functional reach             Reverse twisting lunge             RDLs         Retract scaps, one hand over, one hand under   Resisted C skip          Neutral L spine   Front squats         No L ext   Resisted Push Press             Ninja jumps         Soft landings on box; jump up/step down   Depth drops             Depth jumps         Jump down w/ low jose   Med ball cleans         Start on chest, elbows wide   Other:     Specific Instructions for next treatment:  1. Standing glute strengthening  2. Recheck hip and calves  3.run if calf sx's allow        Treatment Charges: Mins Units   []  Modalities     [x]  Ther Exercise 35 2   [x]  Manual Therapy 15 1   []  Ther Activities     []  Aquatics     []  Vasocompression     []  Other     Total Treatment time 50 3       Assessment: [x] Progressing toward goals. Posterior tib is very tender and in spasm. Pt is loading lateral column of foot and increasing demand on Posterior tib. He is able to correct with cues. Need to continue to work on hip and ankle flexibility and strength to allow pt to gain stability and not demonstrate increased hip adduction in single leg standing. [] No change. [] Other:  [x] Patient would continue to benefit from skilled physical therapy services in order to:Patient would benefit from skilled physical therapy services in order to: Increase hip extension and ankle DF as well as increase calf and hip strength to allow for normal LE mechanics and return t0 usual training for 1/2 marathon    STG: (to be met in 5 treatments)  1. ? Pain:<3/10 pain R calf with running to allow pt to continue to train  2. ? ROM:Normal hip extension and 10 deg ankle DF with knee straight to allow for normal LE mechanics  3. ? Strength: 5/5 hip strength to aide in running mechanics and reduce load on calf during running   4. ? Function: Able to run 7 miles with <3/10 pain R calf   5. Independent with Home Exercise Programs     LTG: (to be met in 10 treatments)  1. No pain R calf to allow pt to run 9 miles without compensation  2.  Able to perform single leg HR 20 reps on each side through full ROM   3. UWRI<20% limitation  4. Able to run as he wishes and be able to run 1/2 marathon                 Patient goals:Be able to run painfree and do GC 1/2 in 6 weeks    Pt. Education:  [] Yes  [] No  [] Reviewed Prior HEP/Ed  Method of Education: [] Verbal  [] Demo  [] Written  Comprehension of Education:  [] Verbalizes understanding. [] Demonstrates understanding. [] Needs review. [] Demonstrates/verbalizes HEP/Ed previously given. Plan: [x] Continue current frequency toward long and short term goals.     [] Specific Instructions for subsequent treatments:       Time In:0705          Time Out: 0800    Electronically signed by:  Johnathan Hanna PT

## 2022-03-23 ENCOUNTER — HOSPITAL ENCOUNTER (OUTPATIENT)
Dept: PHYSICAL THERAPY | Facility: CLINIC | Age: 62
Setting detail: THERAPIES SERIES
Discharge: HOME | End: 2022-03-23
Payer: COMMERCIAL

## 2022-03-23 PROCEDURE — 97110 THERAPEUTIC EXERCISES: CPT

## 2022-03-23 PROCEDURE — 97140 MANUAL THERAPY 1/> REGIONS: CPT

## 2022-03-23 NOTE — FLOWSHEET NOTE
[x] [facility]  Outpatient Rehabilitation &  Therapy  [facility]   [place]: [phone]  F: [phone]      Physical Therapy Daily Treatment Note    Date:  3/23/2022  Patient Name:  Osmel Banks    :  1960  MRN: [id number]  Physician: Dr. Bonilla Fallon: Medical [place] 30 visit hard max  Medical Diagnosis:   Calf strain                    Rehab Codes: M79.661  Onset date:    22                                    Next Dr's appt.:prn  Visit# / total visits:3/10     Cancels/No Shows:0    Subjective:    Pain:  [x] Yes  [] No Location: R calf Pain Rating: (0-10 scale) 8/10 trying to run  Pain altered Tx:  [x] No  [] Yes  Action:  Comments:Same thing happened. Ran 1'on/1'off on Saturday and tried again on Monday.  Saturday was fine and Monday the calf pain was too severe after 1 cycle to continue  Objective:  Manual: DI hip flexor proximal and distal, piriformis, glute med, Hypervolt medial and lateral head R gastroc, TFL and quad  Precautions: standard  Exercises:  Exercise Reps/ Time Weight/ Level Issued for HEP   Comments   Prone             Flying squirrels 2x10   x      Hip ext (glut max)             Supine             Hip flexor stretch on foam roller 2'ea   x x     Eccentric 1 legged bridges 2x10   x      Bridge marches             Sidelying             Clams 90/30 deg             Angelica hip abd             Gym             Lax ball foot roll x15  x x    Medial longitudinal arch ball roll with GTE stretch 2x90\"  x x    Fig 8 banded HR 2x10 lime x      Eccentric HR 2x10   x x     Toe yoga reviewed   x       Soleus HR from split squat position 2x10   x x     Reverse Lunge  2x10    x  x     Monster walks   2 laps  Blue  x  x     Hip circles  2x10  blue    x     Step downs  2x10  4\"  x  x Lateral/posterior   Foam roller hip hike  2sets    x  x    Toe off drill  2x20      x     Functional reach             Reverse twisting lunge             RDLs         Retract scaps, one hand over, one hand under   Resisted C skip          Neutral L spine   Front squats         No L ext   Resisted Push Press             Ninja jumps         Soft landings on box; jump up/step down   Depth drops             Depth jumps         Jump down w/ low jose   Med ball cleans         Start on chest, elbows wide   Other:     Specific Instructions for next treatment:  1. Standing glute strengthening  2. Recheck hip and calves  3.run if calf sx's allow        Treatment Charges: Mins Units   []  Modalities     [x]  Ther Exercise 40 3   [x]  Manual Therapy 15 1   []  Ther Activities     []  Aquatics     []  Vasocompression     []  Other     Total Treatment time 55 4       Assessment: [x] Progressing toward goals.  at proximal medial head of calf. Notable weakness R LE vs L. Still demonstrating limited hip extension as well. Need to continue to strengtheing hips and foot as well as improve flexibility. Recommended no running until weekend. [] No change. [] Other:  [x] Patient would continue to benefit from skilled physical therapy services in order to:Patient would benefit from skilled physical therapy services in order to: Increase hip extension and ankle DF as well as increase calf and hip strength to allow for normal LE mechanics and return t0 usual training for 1/2 marathon    STG: (to be met in 5 treatments)  1. ? Pain:<3/10 pain R calf with running to allow pt to continue to train  2. ? ROM:Normal hip extension and 10 deg ankle DF with knee straight to allow for normal LE mechanics  3. ? Strength: 5/5 hip strength to aide in running mechanics and reduce load on calf during running   4. ? Function: Able to run 7 miles with <3/10 pain R calf   5. Independent with Home Exercise Programs     LTG: (to be met in 10 treatments)  1. No pain R calf to allow pt to run 9 miles without compensation  2.  Able to perform single leg HR 20 reps on each side through full ROM   3. UWRI<20% limitation  4. Able to run as he wishes and be able to run 1/2 marathon                 Patient goals:Be able to run painfree and do GC 1/2 in 6 weeks    Pt. Education:  [] Yes  [] No  [] Reviewed Prior HEP/Ed  Method of Education: [] Verbal  [] Demo  [] Written  Comprehension of Education:  [] Verbalizes understanding. [] Demonstrates understanding. [] Needs review. [] Demonstrates/verbalizes HEP/Ed previously given. Plan: [x] Continue current frequency toward long and short term goals.     [] Specific Instructions for subsequent treatments:       Time In:0700          Time Out: 0800    Electronically signed by:  Julianna Katster, PT

## 2022-03-30 ENCOUNTER — HOSPITAL ENCOUNTER (OUTPATIENT)
Dept: PHYSICAL THERAPY | Facility: CLINIC | Age: 62
Setting detail: THERAPIES SERIES
Discharge: HOME | End: 2022-03-30
Payer: COMMERCIAL

## 2022-03-30 PROCEDURE — 97110 THERAPEUTIC EXERCISES: CPT

## 2022-03-30 PROCEDURE — 97140 MANUAL THERAPY 1/> REGIONS: CPT

## 2022-03-30 NOTE — FLOWSHEET NOTE
[x] [facility]  Outpatient Rehabilitation &  Therapy  [facility]   [place]: [phone]  F: [phone]      Physical Therapy Daily Treatment Note    Date:  3/30/2022  Patient Name:  Terell Sommers    :  1960  MRN: [id number]  Physician: Dr. Amita Hinds: Medical [place] 30 visit hard max  Medical Diagnosis:   Calf strain                    Rehab Codes: M79.661  Onset date:    22                                    Next Dr's appt.:prn  Visit# / total visits:4/10     Cancels/No Shows:0    Subjective:    Pain:  [x] Yes  [] No Location: R calf Pain Rating: (0-10 scale) 0/10 trying to run  Pain altered Tx:  [x] No  [] Yes  Action:  Comments: Able to run 1'on/1. 5'off without pain. Calf . New knee pain post doing flying squirrel exercise. Hurts medially at knee but not during running.    Objective:  Manual: DI hip flexor proximal and distal, piriformis, glute med  Precautions: standard  Exercises:  Exercise Reps/ Time Weight/ Level Issued for HEP   Comments   Prone             Flying squirrels DC        Hip ext (glut max) 2x15      x  on Stability ball   Supine             Hip flexor stretch on foam roller 2'ea   x x     Eccentric 1 legged bridges 2x10   x      Bridge marches             Sidelying             Clams 90/30 deg             Angelica hip abd             Gym             Lax ball foot roll x15  x     Medial longitudinal arch ball roll with GTE stretch 3x90\"  x x    Hip thrust x30   x    Ball  HR 2x10  x x     Eccentric HR 2x10   x x     Toe yoga x20   x  x     Soleus HR from split squat position 2x10   x x     Reverse Lunge  2x10    x  x     Monster walks   2 laps  Blue  x  x     Hip circles  2x10  blue    x     Step downs  2x15  4\"  x  x Lateral   Foam roller hip hike  2sets    x  x    Toe off drill  x30      x     Clocks  x5      x     Reverse twisting lunge             RDLs         Retract scaps, one hand over, one hand under   Resisted C skip          Neutral L spine   Front squats         No L ext   Resisted Push Press             Ninja jumps         Soft landings on box; jump up/step down   Depth drops             Depth jumps         Jump down w/ low jose   Med ball cleans         Start on chest, elbows wide   Other:     Specific Instructions for next treatment:  1. Run next visit for form modifications prn        Treatment Charges: Mins Units   []  Modalities     [x]  Ther Exercise 50 3   [x]  Manual Therapy 10 1   []  Ther Activities     []  Aquatics     []  Vasocompression     []  Other     Total Treatment time 60 4       Assessment: [x] Progressing toward goals. Pt has medial gastroc tenderness but no spasm. Hip extension and IR is limited but improving. Normal post manual techniques. Foot control improving. Negative meniscal tests for new knee pain. No pain throughout exercises at calf or knee. Pt instructed to continue to try to go longer on run intervals. Increase to 2min on 2 mon off next time out. [] No change. [] Other:  [x] Patient would continue to benefit from skilled physical therapy services in order to:Patient would benefit from skilled physical therapy services in order to: Increase hip extension and ankle DF as well as increase calf and hip strength to allow for normal LE mechanics and return t0 usual training for 1/2 marathon    STG: (to be met in 5 treatments)  1. ? Pain:<3/10 pain R calf with running to allow pt to continue to train  2. ? ROM:Normal hip extension and 10 deg ankle DF with knee straight to allow for normal LE mechanics  3. ? Strength: 5/5 hip strength to aide in running mechanics and reduce load on calf during running   4. ? Function: Able to run 7 miles with <3/10 pain R calf   5. Independent with Home Exercise Programs     LTG: (to be met in 10 treatments)  1. No pain R calf to allow pt to run 9 miles without compensation  2.  Able to perform single leg HR 20 reps on each side through full ROM   3. UWRI<20% limitation  4. Able to run as he wishes and be able to run 1/2 marathon                 Patient goals:Be able to run painfree and do GC 1/2 in 6 weeks    Pt. Education:  [] Yes  [] No  [] Reviewed Prior HEP/Ed  Method of Education: [] Verbal  [] Demo  [] Written  Comprehension of Education:  [] Verbalizes understanding. [] Demonstrates understanding. [] Needs review. [] Demonstrates/verbalizes HEP/Ed previously given. Plan: [x] Continue current frequency toward long and short term goals.     [] Specific Instructions for subsequent treatments:       Time In:0700          Time Out: 0800    Electronically signed by:  Wicho Bangura, PT

## 2022-04-04 RX ORDER — RAMIPRIL 1.25 MG/1
CAPSULE ORAL
Qty: 30 CAPSULE | Refills: 3 | Status: SHIPPED | OUTPATIENT
Start: 2022-04-04 | End: 2022-06-29 | Stop reason: SDUPTHER

## 2022-04-06 ENCOUNTER — HOSPITAL ENCOUNTER (OUTPATIENT)
Dept: PHYSICAL THERAPY | Facility: CLINIC | Age: 62
Setting detail: THERAPIES SERIES
Discharge: HOME | End: 2022-04-06
Payer: COMMERCIAL

## 2022-04-06 DIAGNOSIS — J45.51 ASTHMA, SEVERE PERSISTENT, POORLY-CONTROLLED, WITH ACUTE EXACERBATION: ICD-10-CM

## 2022-04-06 PROCEDURE — 97110 THERAPEUTIC EXERCISES: CPT

## 2022-04-06 PROCEDURE — 97140 MANUAL THERAPY 1/> REGIONS: CPT

## 2022-04-06 NOTE — FLOWSHEET NOTE
[x] [facility]  Outpatient Rehabilitation &  Therapy  [facility]   [place]: [phone]  F: [phone]      Physical Therapy Daily Treatment Note    Date:  2022  Patient Name:  Mary Zhou    :  1960  MRN: [id number]  Physician: Dr. Mikki Parker: Medical [place] 30 visit hard max  Medical Diagnosis:   Calf strain                    Rehab Codes: M79.661  Onset date:    22                                    Next Dr's appt.:prn  Visit# / total visits:5/10     Cancels/No Shows:0    Subjective:    Pain:  [x] Yes  [] No Location: R calf Pain Rating: (0-10 scale) 0/10 trying to run  Pain altered Tx:  [x] No  [] Yes  Action:  Comments: Able to run 4.5 miles continuous pain free  Objective:  Manual: DI  piriformis, glute med, Hypervolt L TFL and quad  Precautions: standard  Exercises:  Exercise Reps/ Time Weight/ Level Issued for HEP   Comments   Prone             Flying squirrels DC        Hip ext (glut max) 2x15        on Stability ball   Supine             Hip flexor stretch on foam roller 2'ea   x      Eccentric 1 legged bridges 2x10   x      Bridge marches             Sidelying             Clams 90/30 deg             Angelica hip abd             Gym             Lax ball foot roll x15  x     Medial longitudinal arch ball roll with GTE stretch 3x90\"  x x    Hip thrust x30   x    Ball  HR 3x10  x x     Eccentric HR 2x10   x x     Toe yoga x20   x  x     Soleus HR from split squat position 2x10   x x     Reverse Lunge  2x10    x  x     Monster walks   2 laps  Blue  x  x     Hip circles  2x10  blue    x     Step downs  2x15  4\"  x  x Lateral & posterior   Foam roller hip hike  2sets    x  x    Toe off drill  x20      x     Clocks  x5           Deadbug  2 sets to fatigue      x     RDLs         Retract scaps, one hand over, one hand under   Resisted C skip          Neutral L spine   Front squats         No L ext   Resisted Push Press             Ninja jumps         Soft landings on box; jump up/step down   Depth drops             Depth jumps         Jump down w/ low jose   Med ball cleans         Start on chest, elbows wide   Other:     Specific Instructions for next treatment:  1. Run next visit for form modifications prn        Treatment Charges: Mins Units   []  Modalities     [x]  Ther Exercise 48 3   [x]  Manual Therapy 10 1   []  Ther Activities     []  Aquatics     []  Vasocompression     []  Other     Total Treatment time 58 4       Assessment: [x] Progressing toward goals. Calves feel totally normal this date. GTE is normal but pt still reports a stretch at end range. Better control of foot and ankle with no spinning around to get over the top of the foot. Did not run today as pt has a planned rest day this date. Vacation next week then will return for recheck. [] No change. [] Other:  [x] Patient would continue to benefit from skilled physical therapy services in order to:Patient would benefit from skilled physical therapy services in order to: Increase hip extension and ankle DF as well as increase calf and hip strength to allow for normal LE mechanics and return t0 usual training for 1/2 marathon    STG: (to be met in 5 treatments)  1. ? Pain:<3/10 pain R calf with running to allow pt to continue to train  2. ? ROM:Normal hip extension and 10 deg ankle DF with knee straight to allow for normal LE mechanics  3. ? Strength: 5/5 hip strength to aide in running mechanics and reduce load on calf during running   4. ? Function: Able to run 7 miles with <3/10 pain R calf   5. Independent with Home Exercise Programs     LTG: (to be met in 10 treatments)  1. No pain R calf to allow pt to run 9 miles without compensation  2. Able to perform single leg HR 20 reps on each side through full ROM   3. UWRI<20% limitation  4.  Able to run as he wishes and be able to run 1/2 marathon                 Patient goals:Be able to run painfree and do GC 1/2 in 6 weeks    Pt. Education:  [] Yes  [] No  [] Reviewed Prior HEP/Ed  Method of Education: [] Verbal  [] Demo  [] Written  Comprehension of Education:  [] Verbalizes understanding. [] Demonstrates understanding. [] Needs review. [] Demonstrates/verbalizes HEP/Ed previously given. Plan: [x] Continue current frequency toward long and short term goals.     [] Specific Instructions for subsequent treatments:       Time In:1325Time Out: 1423    Electronically signed by:  Saranya Us PT

## 2022-04-07 RX ORDER — ALBUTEROL SULFATE 90 UG/1
2 AEROSOL, METERED RESPIRATORY (INHALATION) EVERY 6 HOURS PRN
Qty: 3 EACH | Refills: 0 | Status: SHIPPED | OUTPATIENT
Start: 2022-04-07

## 2022-04-07 NOTE — TELEPHONE ENCOUNTER
LAST VISIT: 6/4/21  NEXT VISIT: 6/24/22    Per last dictation patient is on this medication. Please sign for refill if ok. Thank you.

## 2022-04-11 ENCOUNTER — APPOINTMENT (OUTPATIENT)
Dept: PHYSICAL THERAPY | Facility: CLINIC | Age: 62
End: 2022-04-11
Payer: COMMERCIAL

## 2022-04-13 ENCOUNTER — APPOINTMENT (OUTPATIENT)
Dept: PHYSICAL THERAPY | Facility: CLINIC | Age: 62
End: 2022-04-13
Payer: COMMERCIAL

## 2022-04-20 ENCOUNTER — APPOINTMENT (OUTPATIENT)
Dept: PHYSICAL THERAPY | Facility: CLINIC | Age: 62
End: 2022-04-20
Payer: COMMERCIAL

## 2022-05-02 ENCOUNTER — HOSPITAL ENCOUNTER (OUTPATIENT)
Dept: PHYSICAL THERAPY | Facility: CLINIC | Age: 62
Setting detail: THERAPIES SERIES
Discharge: HOME | End: 2022-05-02
Payer: COMMERCIAL

## 2022-05-02 PROCEDURE — 97140 MANUAL THERAPY 1/> REGIONS: CPT

## 2022-05-02 PROCEDURE — 97110 THERAPEUTIC EXERCISES: CPT

## 2022-05-02 NOTE — FLOWSHEET NOTE
[x] [facility]  Outpatient Rehabilitation &  Therapy  [facility]   [place]: [phone]  F: [phone]      Physical Therapy Daily Treatment Note    Date:  2022  Patient Name:  Faizan Gonzalez    :  1960  MRN: [id number]  Physician: Dr. Jay Eldridge: Medical [place] 30 visit hard max  Medical Diagnosis:   Calf strain                    Rehab Codes: M79.661  Onset date:    22                                    Next Dr's appt.:prn  Visit# / total visits:6/10     Cancels/No Shows:0    Subjective:    Pain:  [x] Yes  [] No Location: R calf Pain Rating: (0-10 scale) 2-310   Pain altered Tx:  [x] No  [] Yes  Action:  Comments: Pt reports he was able to start running continuously and then was able to complete the GC  without a problem. Just a mild lateral calf cramp because of the heat not the injury on the inside of the calf. Walked 10 steps and the cramp resolved. Felt back to normal 4 days post  marathon and then turned and began walking while at work and the inside of the calf had a sharp pain again and I was limping all weekend and can still feel it today.    Objective:  Manual: Hypervolt Medial gastroc, MFR post tib  Precautions: standard  Exercises:  Exercise Reps/ Time Weight/ Level Issued for HEP   Comments   Prone             Flying squirrels DC        Hip ext (glut max) 2x15        on Stability ball   Supine             Hip flexor stretch on foam roller 2'ea   x      Eccentric 1 legged bridges 2x10   x      Bridge marches             Sidelying             Clams 90/30 deg             Angelica hip abd             Gym             Lax ball foot roll x15  x x    Medial longitudinal arch ball roll with GTE stretch 3x90\"  x x    Hip thrust x30       Ball  HR 3x10  x      Eccentric HR x10   x x  pain so stopped   Toe yoga x20   x  x     Arch lift 10x10\"   x    Resisted lesser toe flex x20 lime x x    Soleus HR from split squat position 2x10   x      Reverse Lunge  2x10    x       Monster walks   2 laps  Blue  x       Hip circles  2x10  blue         Step downs  2x15  4\"  x   Lateral & posterior   Foam roller hip hike  2sets    x      Toe off drill  x20           Clocks  x5           Deadbug  2 sets to fatigue           RDLs         Retract scaps, one hand over, one hand under   Resisted C skip          Neutral L spine   Front squats         No L ext   Resisted Push Press             Ninja jumps         Soft landings on box; jump up/step down   Depth drops             Depth jumps         Jump down w/ low jose   Med ball cleans         Start on chest, elbows wide   Other:     Specific Instructions for next treatment:  1. Run next visit for form modifications prn        Treatment Charges: Mins Units   []  Modalities     [x]  Ther Exercise 25 2   [x]  Manual Therapy 20 1   []  Ther Activities     []  Aquatics     []  Vasocompression     []  Other     Total Treatment time 45 3       Assessment: [x] Progressing toward goals. Pt has medial gastroc trigger point and tenderness. He was unable to perform eccentric calf without a mild increase in pain. Stopped the exercise. He is week in short flexors of lesser toes  Looked at work shoes. Work shoes are very stiff at forefoot. Recommended work shoe that bends more at MTP to reduce load at ankle. [] No change. [] Other:  [x] Patient would continue to benefit from skilled physical therapy services in order to:Patient would benefit from skilled physical therapy services in order to:  Increase hip extension and ankle DF as well as increase calf and hip strength to allow for normal LE mechanics and return t0 usual training for 1/2 marathon    STG: (to be met in 5 treatments)  1. ? Pain:<3/10 pain R calf with running to allow pt to continue to train  2. ? ROM:Normal hip extension and 10 deg ankle DF with knee straight to allow for normal LE mechanics  3. ? Strength: 5/5 hip strength to aide in running mechanics and reduce load on calf during running   4. ? Function: Able to run 7 miles with <3/10 pain R calf   5. Independent with Home Exercise Programs     LTG: (to be met in 10 treatments)  1. No pain R calf to allow pt to run 9 miles without compensation  2. Able to perform single leg HR 20 reps on each side through full ROM   3. UWRI<20% limitation  4. Able to run as he wishes and be able to run 1/2 marathon                 Patient goals:Be able to run painfree and do GC 1/2 in 6 weeks    Pt. Education:  [] Yes  [] No  [] Reviewed Prior HEP/Ed  Method of Education: [] Verbal  [] Demo  [] Written  Comprehension of Education:  [] Verbalizes understanding. [] Demonstrates understanding. [] Needs review. [] Demonstrates/verbalizes HEP/Ed previously given. Plan: [x] Continue current frequency toward long and short term goals.     [] Specific Instructions for subsequent treatments:       Time In:1500 Time Out: 6893    Electronically signed by:  Marisabel Booth PT

## 2022-05-05 DIAGNOSIS — J45.51 ASTHMA, SEVERE PERSISTENT, POORLY-CONTROLLED, WITH ACUTE EXACERBATION: ICD-10-CM

## 2022-05-05 RX ORDER — MONTELUKAST SODIUM 10 MG/1
10 TABLET ORAL NIGHTLY
Qty: 90 TABLET | Refills: 3 | OUTPATIENT
Start: 2022-05-05

## 2022-05-06 RX ORDER — FLUTICASONE PROPIONATE 50 MCG
1 SPRAY, SUSPENSION (ML) NASAL DAILY
Qty: 32 G | Refills: 3 | Status: SHIPPED | OUTPATIENT
Start: 2022-05-06 | End: 2022-08-02 | Stop reason: SDUPTHER

## 2022-05-10 ENCOUNTER — HOSPITAL ENCOUNTER (OUTPATIENT)
Dept: PHYSICAL THERAPY | Facility: CLINIC | Age: 62
Setting detail: THERAPIES SERIES
Discharge: HOME | End: 2022-05-10
Payer: COMMERCIAL

## 2022-05-10 NOTE — FLOWSHEET NOTE
[] [facility]) - [facility] &  Therapy  [address]    [phone]  F: [phone]   [] [address] [facility] [address]   [address]  P: [phone]  F: [phone]  [] [address] &  Therapy  [address] [facility]  P: [phone]  F: [phone] [] [address]  P: [phone]  F: [phone]  [] [address]  [facility]   [address]   [place]: [phone]  F: [phone]   [] [place] [address] [facility] [address]  [place]: [phone]   F: [phone]     Physical Therapy Cancel/No Show note    Date: 5/10/2022  Patient: Sophia Snow  : 1960  MRN: [id number]    Cancels/No Shows to date:     For today's appointment patient:    [x]  Cancelled    [] Rescheduled appointment    [] No-show     Reason given by patient:    []  Patient ill    []  Conflicting appointment    [] No transportation      [] Conflict with work    [x] No reason given    [] Weather related    [] [id number]    [] Other:      Comments:        [] Next appointment was confirmed    Electronically signed by: Madeleine Sarkar

## 2022-05-12 ENCOUNTER — APPOINTMENT (OUTPATIENT)
Dept: PHYSICAL THERAPY | Facility: CLINIC | Age: 62
End: 2022-05-12
Payer: COMMERCIAL

## 2022-05-16 RX ORDER — TADALAFIL 5 MG/1
5 TABLET ORAL DAILY
Qty: 90 TABLET | Refills: 1 | Status: SHIPPED | OUTPATIENT
Start: 2022-05-16

## 2022-05-17 ENCOUNTER — HOSPITAL ENCOUNTER (OUTPATIENT)
Dept: PHYSICAL THERAPY | Facility: CLINIC | Age: 62
Setting detail: THERAPIES SERIES
Discharge: HOME | End: 2022-05-17
Payer: COMMERCIAL

## 2022-05-17 PROCEDURE — 97110 THERAPEUTIC EXERCISES: CPT

## 2022-05-17 PROCEDURE — 97140 MANUAL THERAPY 1/> REGIONS: CPT

## 2022-05-17 NOTE — FLOWSHEET NOTE
[x] [facility]  Outpatient Rehabilitation &  Therapy  [facility]   [place]: [phone]  F: [phone]      Physical Therapy Daily Treatment Note    Date:  2022  Patient Name:  Lizzie Borrero    :  1960  MRN: [id number]  Physician: Dr. Rolo Hewitt: Medical [place] 30 visit hard max  Medical Diagnosis:   Calf strain                    Rehab Codes: M79.661  Onset date:    22                                    Next Dr's appt.:prn  Visit# / total visits:7/10     Cancels/No Shows:0    Subjective:    Pain:  [x] Yes  [] No Location: R calf Pain Rating: (0-10 scale) 0/10   Pain altered Tx:  [x] No  [] Yes  Action:  Comments: Pt reports he was able to   Objective:  Manual: Di hip flexor, piriformis  Precautions: standard  Exercises:  Exercise Reps/ Time Weight/ Level Issued for HEP   Comments   Prone             Hip ext (glut max) 2x15      x  over table   Supine             Hip flexor stretch on foam roller 1'ea   x x     Eccentric 1 legged bridges 2x10   x      Bridge marches             Sidelying             Clams 90/30 deg             Angelica hip abd             Gym             Lax ball foot roll x15  x     Medial longitudinal arch ball roll with GTE stretch 3x90\"  x     Hip thrust x30       Ball  HR 3x10  x      Eccentric HR 2x10   x x     Toe yoga x20   x  x     Arch lift 10x10\"       Resisted lesser toe flex x20 lime x     Soleus HR from seated 2x10  44# x x     Reverse Lunge  2x10    x       Monster walks   2 laps  Blue  x       Hip circles  2x10  purple    x     Step downs  2x15  4\"  x   Lateral & posterior   Foam roller hip hike  2sets    x      Toe off drill  x20           Clocks  x5           Deadbug  2 sets to fatigue           Other:     Specific Instructions for next treatment:Recheck for DC    Run  3 laps in clinic total 5'  Cue: More upright posture and 172spm on metronome      Treatment Charges: Mins Units   [] Modalities     [x]  Ther Exercise 30 2   [x]  Manual Therapy 15 1   []  Ther Activities     []  Aquatics     []  Vasocompression     [x]  NMR 5 1   Total Treatment time 50 3       Assessment: [x] Progressing toward goals. Pt has no pain in R calf but upon testing he is lacking hip extensionROM and strength. He is also lacking strength at R gastroc and soleus. Modified performance of calf strengthening as pt is cutting ROM short and using too much UE. Also unable to complete soleus calf raise in standing more than a 1 1/2 \" HR. Changed to seated and loaded it. Will see him back in 3 weeks. He is to focus strengthening on gastroc, soleus and glute max. [] No change. [] Other:  [x] Patient would continue to benefit from skilled physical therapy services in order to:Patient would benefit from skilled physical therapy services in order to: Increase hip extension and ankle DF as well as increase calf and hip strength to allow for normal LE mechanics and return t0 usual training for 1/2 marathon    STG: (to be met in 5 treatments)  1. ? Pain:<3/10 pain R calf with running to allow pt to continue to train  2. ? ROM:Normal hip extension and 10 deg ankle DF with knee straight to allow for normal LE mechanics  3. ? Strength: 5/5 hip strength to aide in running mechanics and reduce load on calf during running   4. ? Function: Able to run 7 miles with <3/10 pain R calf   5. Independent with Home Exercise Programs     LTG: (to be met in 10 treatments)  1. No pain R calf to allow pt to run 9 miles without compensation  2. Able to perform single leg HR 20 reps on each side through full ROM   3. UWRI<20% limitation  4. Able to run as he wishes and be able to run 1/2 marathon                 Patient goals:Be able to run painfree and do GC 1/2 in 6 weeks    Pt. Education:  [] Yes  [] No  [] Reviewed Prior HEP/Ed  Method of Education: [] Verbal  [] Demo  [] Written  Comprehension of Education:  [] Verbalizes understanding.   [] Demonstrates understanding. [] Needs review. [] Demonstrates/verbalizes HEP/Ed previously given. Plan: [x] Continue current frequency toward long and short term goals.     [] Specific Instructions for subsequent treatments:       Time In:1900 Time Out: 1950    Electronically signed by:  Deepa Houston, PT

## 2022-06-15 LAB
CHOLESTEROL/HDL RATIO: 4.1
CHOLESTEROL/HDL RATIO: NORMAL
CHOLESTEROL: 183 MG/DL
CHOLESTEROL: NORMAL MG/DL
GLUCOSE BLD-MCNC: 96 MG/DL (ref 70–99)
GLUCOSE BLD-MCNC: NORMAL MG/DL
HDLC SERPL-MCNC: 45 MG/DL
HDLC SERPL-MCNC: NORMAL MG/DL
LDL CHOLESTEROL: 124 MG/DL (ref 0–130)
LDL CHOLESTEROL: NORMAL MG/DL
PATIENT FASTING?: YES
PATIENT FASTING?: YES
TRIGL SERPL-MCNC: 71 MG/DL
TRIGL SERPL-MCNC: NORMAL MG/DL
VLDLC SERPL CALC-MCNC: NORMAL MG/DL

## 2022-06-24 ENCOUNTER — OFFICE VISIT (OUTPATIENT)
Dept: PULMONOLOGY | Age: 62
End: 2022-06-24

## 2022-06-24 VITALS
BODY MASS INDEX: 24.53 KG/M2 | DIASTOLIC BLOOD PRESSURE: 81 MMHG | OXYGEN SATURATION: 96 % | TEMPERATURE: 98.2 F | HEIGHT: 71 IN | WEIGHT: 175.2 LBS | SYSTOLIC BLOOD PRESSURE: 122 MMHG | HEART RATE: 60 BPM

## 2022-06-24 DIAGNOSIS — J45.50 ASTHMA, SEVERE PERSISTENT, WELL-CONTROLLED: Primary | ICD-10-CM

## 2022-06-24 DIAGNOSIS — R05.3 CHRONIC COUGH: ICD-10-CM

## 2022-06-24 PROCEDURE — 99213 OFFICE O/P EST LOW 20 MIN: CPT | Performed by: INTERNAL MEDICINE

## 2022-06-24 ASSESSMENT — ENCOUNTER SYMPTOMS
RESPIRATORY NEGATIVE: 1
EYES NEGATIVE: 1

## 2022-06-24 NOTE — PROGRESS NOTES
Subjective:      Patient ID: Traci Amaya is a 63 y.o. male being seen in my clinic for   Chief Complaint   Patient presents with    Asthma     Asthma       HPI  Follow-up for asthma. Since his last visit a year ago his asthma has been under excellent control. Remains on Dulera 200 mcg. He uses albuterol rarely. Cough persists although much better than previous 3 years ago. Likely manifestation of asthma. Denies nocturnal or exercise-induced symptoms. No new health problems. COVID vaccinations up-to-date. Review of Systems   Constitutional: Negative. HENT: Negative. Eyes: Negative. Respiratory: Negative. Cardiovascular: Negative. All other systems reviewed and are negative.       Objective:     Vitals:    06/24/22 1412   BP: 122/81   Pulse: 60   Temp: 98.2 °F (36.8 °C)   SpO2: 96%  Comment: room air at rest   Weight: 175 lb 3.2 oz (79.5 kg)   Height: 5' 11\" (1.803 m)     Current Outpatient Medications   Medication Sig Dispense Refill    tadalafil (CIALIS) 5 MG tablet Take 1 tablet by mouth daily 90 tablet 1    fluticasone (FLONASE) 50 MCG/ACT nasal spray 1 spray by Each Nostril route daily 32 g 3    albuterol sulfate HFA (PROAIR HFA) 108 (90 Base) MCG/ACT inhaler Inhale 2 puffs into the lungs every 6 hours as needed for Wheezing or Shortness of Breath 3 each 0    ramipril (ALTACE) 1.25 MG capsule TAKE 1 CAPSULE BY MOUTH ONE TIME A DAY 30 capsule 3    SUMAtriptan (IMITREX) 100 MG tablet TAKE ONE TABLET BY MOUTH ONCE AS NEEDED FOR MIGRAINE 9 tablet 0    omeprazole (PRILOSEC) 20 MG delayed release capsule TAKE 1 CAPSULE BY MOUTH 2 TIMES A  capsule 1    montelukast (SINGULAIR) 10 MG tablet Take 1 tablet by mouth nightly 90 tablet 3    methocarbamol (ROBAXIN) 500 MG tablet  (Patient not taking: Reported on 11/5/2021)      Blood Pressure KIT 1 each by Does not apply route daily 1 kit 0    mometasone-formoterol (DULERA) 200-5 MCG/ACT inhaler Inhale 2 puffs into the lungs 2 times daily 3 Inhaler 3     No current facility-administered medications for this visit. Physical Exam  Vitals and nursing note reviewed. Constitutional:       Appearance: He is well-developed. HENT:      Nose: Nose normal. No congestion. Mouth/Throat:      Mouth: Mucous membranes are moist.      Pharynx: Oropharynx is clear. No oropharyngeal exudate. Eyes:      General: No scleral icterus. Conjunctiva/sclera: Conjunctivae normal.   Neck:      Thyroid: No thyromegaly. Vascular: No JVD. Trachea: No tracheal deviation. Cardiovascular:      Rate and Rhythm: Normal rate and regular rhythm. Heart sounds: Normal heart sounds. No murmur heard. No gallop. Pulmonary:      Effort: Pulmonary effort is normal. No respiratory distress. Breath sounds: No wheezing or rales. Chest:      Chest wall: No tenderness. Abdominal:      Palpations: Abdomen is soft. Tenderness: There is no abdominal tenderness. Musculoskeletal:      Cervical back: Neck supple. Lymphadenopathy:      Cervical: No cervical adenopathy. Skin:     General: Skin is warm and dry. Neurological:      Mental Status: He is alert and oriented to person, place, and time. Wt Readings from Last 3 Encounters:   06/24/22 175 lb 3.2 oz (79.5 kg)   11/01/21 182 lb 12.8 oz (82.9 kg)   10/28/21 175 lb (79.4 kg)     Results for orders placed or performed in visit on 06/15/22   Be Well Health Screen   Result Value Ref Range    Glucose 96 70 - 99 mg/dL    Cholesterol 183 <200 mg/dL    HDL 45 >40 mg/dL    LDL Cholesterol 124 0 - 130 mg/dL    Chol/HDL Ratio 4.1 <5    Triglycerides 71 <150 mg/dL    Patient Fasting? YES        :      1. Asthma, severe persistent, well-controlled    2.  Chronic cough      Patient Active Problem List   Diagnosis    Right hip pain    Arthritis of right hip    Chronic midline low back pain without sciatica    Tendonitis of knee, right    Acute pain of right knee    DDD (degenerative disc disease), lumbar    Vertebrogenic pain         Plan:      1. Continue bronchodilators  2. Discussed strategies for management of asthma, including evaluation and descalation of therapy  3. Avoid environmental triggers  4. Return to clinic in 12 months    No orders of the defined types were placed in this encounter. No orders of the defined types were placed in this encounter. Return in about 1 year (around 6/24/2023).        Electronically signed by Donn Bullock DO on 6/24/2022at 2:38 PM

## 2022-06-29 RX ORDER — RAMIPRIL 1.25 MG/1
CAPSULE ORAL
Qty: 90 CAPSULE | Refills: 1 | Status: SHIPPED | OUTPATIENT
Start: 2022-06-29

## 2022-06-29 NOTE — TELEPHONE ENCOUNTER
Sophia Snow is calling to request a refill on the following medication(s):    Last Visit Date (If Applicable):  54/1/9361    Next Visit Date:    Visit date not found    Medication Request:  Requested Prescriptions     Pending Prescriptions Disp Refills    ramipril (ALTACE) 1.25 MG capsule 30 capsule 3

## 2022-07-05 ENCOUNTER — TELEPHONE (OUTPATIENT)
Dept: PULMONOLOGY | Age: 62
End: 2022-07-05

## 2022-07-05 DIAGNOSIS — J45.51 ASTHMA, SEVERE PERSISTENT, POORLY-CONTROLLED, WITH ACUTE EXACERBATION: ICD-10-CM

## 2022-07-05 NOTE — TELEPHONE ENCOUNTER
Pharmacy called and stated that they were having a problem with system and need this order resent over to them for patient please sign pended med over       Thanks

## 2022-07-12 ENCOUNTER — OFFICE VISIT (OUTPATIENT)
Dept: PAIN MANAGEMENT | Age: 62
End: 2022-07-12
Payer: COMMERCIAL

## 2022-07-12 VITALS
WEIGHT: 176.4 LBS | BODY MASS INDEX: 24.69 KG/M2 | SYSTOLIC BLOOD PRESSURE: 128 MMHG | HEIGHT: 71 IN | HEART RATE: 70 BPM | OXYGEN SATURATION: 95 % | DIASTOLIC BLOOD PRESSURE: 74 MMHG

## 2022-07-12 DIAGNOSIS — M53.3 CHRONIC RIGHT SI JOINT PAIN: Primary | ICD-10-CM

## 2022-07-12 DIAGNOSIS — M47.817 LUMBOSACRAL SPONDYLOSIS WITHOUT MYELOPATHY: ICD-10-CM

## 2022-07-12 DIAGNOSIS — M54.50 CHRONIC MIDLINE LOW BACK PAIN WITHOUT SCIATICA: ICD-10-CM

## 2022-07-12 DIAGNOSIS — M54.89 VERTEBROGENIC PAIN: Chronic | ICD-10-CM

## 2022-07-12 DIAGNOSIS — G89.29 CHRONIC MIDLINE LOW BACK PAIN WITHOUT SCIATICA: ICD-10-CM

## 2022-07-12 DIAGNOSIS — G89.29 CHRONIC RIGHT SI JOINT PAIN: Primary | ICD-10-CM

## 2022-07-12 PROCEDURE — 99214 OFFICE O/P EST MOD 30 MIN: CPT | Performed by: ANESTHESIOLOGY

## 2022-07-12 ASSESSMENT — ENCOUNTER SYMPTOMS
SHORTNESS OF BREATH: 0
WHEEZING: 0
BACK PAIN: 1

## 2022-07-12 NOTE — PROGRESS NOTES
The patient is a 63 y. o. Non- / non  male.     Chief Complaint   Patient presents with    Back Pain        HPI  70-year-old man with history of right-sided lower back pain  Patient is physically active and is an avid runner  Had recent flareup of right-sided lumbosacral area pain was treated with the oral steroid with some relief    Pain is constant sharp aggravated with standing and running  No dermatomal radiation in leg  No associated dermatomal numbness or paresthesia    In past he was diagnosed with lumbar degenerative disc disease and vertebral agenic low back pain for which he had basivertebral nerve ablation 8 months back that provided almost 100% improvement in axial lower back pain    Tried therapy in past  Continue to do extensive home exercises  Have tried NSAIDs  Pain is affecting quality of life and activity level    Patient is here today for low back pain, pain level is a two, back pain has been present for six years, lifting, after running will make the back pain worse, steroids relieves patient from pain    Past Medical History:   Diagnosis Date    Arthritis     in hip    Asthma     GERD (gastroesophageal reflux disease)         Past Surgical History:   Procedure Laterality Date    COLONOSCOPY  04/06/2018    polyp removed    COLONOSCOPY N/A 4/6/2018    COLONOSCOPY POLYPECTOMY SNARE/COLD BIOPSY performed by Edda Wong MD at [facility] Right 11/16/2020    RIGHT L4 AND L5 EPIDURAL STEROID INJECTION performed by Samy Olson MD at [facility] N/A 10/28/2021    BASIVERTEBRAL NERVE NERVE RADIOFREQUENCY ABLATION INTRACEPT PROCEDURE at L4 / L5 / S1 performed by Samy Olson MD at [address] [place] [address] History     Socioeconomic History    Marital status:      Spouse name: None    Number of children: None    Years of education: None    Highest education level: None Occupational History    None   Tobacco Use    Smoking status: Former Smoker     Packs/day: 0.10     Years: 10.00     Pack years: 1.00     Types: Cigars     Quit date:      Years since quittin.5    Smokeless tobacco: Never Used    Tobacco comment: patient states 4 cigars a year    Vaping Use    Vaping Use: Never used   Substance and Sexual Activity    Alcohol use: Yes     Comment: socially    Drug use: No    Sexual activity: Yes   Other Topics Concern    None   Social History Narrative    None     Social Determinants of Health     Financial Resource Strain: Low Risk     Difficulty of Paying Living Expenses: Not hard at all   Food Insecurity: No Food Insecurity    Worried About Running Out of Food in the Last Year: Never true    Sandy of Food in the Last Year: Never true   Transportation Needs:     Lack of Transportation (Medical): Not on file    Lack of Transportation (Non-Medical):  Not on file   Physical Activity:     Days of Exercise per Week: Not on file    Minutes of Exercise per Session: Not on file   Stress:     Feeling of Stress : Not on file   Social Connections:     Frequency of Communication with Friends and Family: Not on file    Frequency of Social Gatherings with Friends and Family: Not on file    Attends Latter day Services: Not on file    Active Member of [address] [place] [address] [employer] or Organizations: Not on file    Attends Club or Organization Meetings: Not on file    Marital Status: Not on file   Intimate Partner Violence:     Fear of Current or Ex-Partner: Not on file    Emotionally Abused: Not on file    Physically Abused: Not on file    Sexually Abused: Not on file   Housing Stability:     Unable to Pay for Housing in the Last Year: Not on file    Number of [place] in the Last Year: Not on file    Unstable Housing in the Last Year: Not on file       Family History   Problem Relation Age of Onset    No Known Problems Mother     Diabetes Father        Allergies   Allergen Reactions    Ciprofloxacin Dermatitis       Vitals:    07/12/22 1601   BP: 128/74   Pulse: 70   SpO2: 95%       Current Outpatient Medications   Medication Sig Dispense Refill    mometasone-formoterol (DULERA) 200-5 MCG/ACT inhaler Inhale 2 puffs into the lungs 2 times daily 90 each 3    ramipril (ALTACE) 1.25 MG capsule TAKE 1 CAPSULE BY MOUTH ONE TIME A DAY 90 capsule 1    tadalafil (CIALIS) 5 MG tablet Take 1 tablet by mouth daily 90 tablet 1    fluticasone (FLONASE) 50 MCG/ACT nasal spray 1 spray by Each Nostril route daily 32 g 3    albuterol sulfate HFA (PROAIR HFA) 108 (90 Base) MCG/ACT inhaler Inhale 2 puffs into the lungs every 6 hours as needed for Wheezing or Shortness of Breath 3 each 0    SUMAtriptan (IMITREX) 100 MG tablet TAKE ONE TABLET BY MOUTH ONCE AS NEEDED FOR MIGRAINE 9 tablet 0    omeprazole (PRILOSEC) 20 MG delayed release capsule TAKE 1 CAPSULE BY MOUTH 2 TIMES A  capsule 1    montelukast (SINGULAIR) 10 MG tablet Take 1 tablet by mouth nightly 90 tablet 3    Blood Pressure KIT 1 each by Does not apply route daily 1 kit 0    methocarbamol (ROBAXIN) 500 MG tablet  (Patient not taking: Reported on 11/5/2021)       No current facility-administered medications for this visit. Review of Systems   Constitutional: Negative for chills and fever. Respiratory: Negative for shortness of breath and wheezing. Cardiovascular: Negative for chest pain. Musculoskeletal: Positive for back pain. Negative for neck pain. Neurological: Negative for dizziness, numbness and headaches. Objective:  General Appearance:  Comfortable, in no acute distress and in pain. Vital signs: (most recent): Blood pressure 128/74, pulse 70, height 5' 11\" (1.803 m), weight 176 lb 6.4 oz (80 kg), SpO2 95 %. Vital signs are normal.  No fever. Output: Producing urine and producing stool. Lungs:  Normal effort. He is not in respiratory distress. Heart: Normal rate.     Neurological: Patient is alert and oriented to person, place and time. Lumbar spine examination  No apparent deformity and inspection  Range of motion preserved  Positive tenderness to palpation over right SI joint area  Manish's test positive  Gaenslen's test positive  Straight leg raise negative  Gait is stable  Pelvic compression reproduce chest pain    Assessment & Plan  1. Chronic right SI joint pain    2. Chronic midline low back pain without sciatica    3. Vertebrogenic pain    4. Lumbosacral spondylosis without myelopathy        Orders Placed This Encounter   Procedures    MT INJECT SI JOINT ARTHRGRPHY&/ANES/STEROID W/IMAGE      No orders of the defined types were placed in this encounter.      If SI joint intervention do not provide relief then consider for right lumbar facet diagnostic block at L3-S1  If that also fails then consider for lumbar epidural steroid injection          Electronically signed by Jose Tejada MD on 7/12/2022 at 4:23 PM

## 2022-08-03 RX ORDER — FLUTICASONE PROPIONATE 50 MCG
1 SPRAY, SUSPENSION (ML) NASAL DAILY
Qty: 32 G | Refills: 3 | Status: SHIPPED | OUTPATIENT
Start: 2022-08-03 | End: 2022-10-21 | Stop reason: SDUPTHER

## 2022-08-10 ENCOUNTER — HOSPITAL ENCOUNTER (OUTPATIENT)
Dept: PAIN MANAGEMENT | Facility: CLINIC | Age: 62
Discharge: HOME | End: 2022-08-10
Payer: COMMERCIAL

## 2022-08-10 VITALS
TEMPERATURE: 97.4 F | SYSTOLIC BLOOD PRESSURE: 171 MMHG | HEART RATE: 56 BPM | OXYGEN SATURATION: 95 % | HEIGHT: 71 IN | DIASTOLIC BLOOD PRESSURE: 109 MMHG | BODY MASS INDEX: 24.64 KG/M2 | WEIGHT: 176 LBS | RESPIRATION RATE: 13 BRPM

## 2022-08-10 DIAGNOSIS — R52 PAIN MANAGEMENT: ICD-10-CM

## 2022-08-10 DIAGNOSIS — M53.3 CHRONIC SI JOINT PAIN: Chronic | ICD-10-CM

## 2022-08-10 DIAGNOSIS — G89.29 CHRONIC SI JOINT PAIN: Chronic | ICD-10-CM

## 2022-08-10 PROCEDURE — G0260 INJ FOR SACROILIAC JT ANESTH: HCPCS

## 2022-08-10 PROCEDURE — 6360000004 HC RX CONTRAST MEDICATION: Performed by: ANESTHESIOLOGY

## 2022-08-10 PROCEDURE — 2500000003 HC RX 250 WO HCPCS: Performed by: ANESTHESIOLOGY

## 2022-08-10 PROCEDURE — 6360000002 HC RX W HCPCS: Performed by: ANESTHESIOLOGY

## 2022-08-10 PROCEDURE — 27096 INJECT SACROILIAC JOINT: CPT | Performed by: ANESTHESIOLOGY

## 2022-08-10 RX ORDER — BUPIVACAINE HYDROCHLORIDE 5 MG/ML
INJECTION, SOLUTION EPIDURAL; INTRACAUDAL
Status: COMPLETED | OUTPATIENT
Start: 2022-08-10 | End: 2022-08-10

## 2022-08-10 RX ORDER — LIDOCAINE HYDROCHLORIDE 10 MG/ML
INJECTION, SOLUTION EPIDURAL; INFILTRATION; INTRACAUDAL; PERINEURAL
Status: COMPLETED | OUTPATIENT
Start: 2022-08-10 | End: 2022-08-10

## 2022-08-10 RX ORDER — DEXAMETHASONE SODIUM PHOSPHATE 10 MG/ML
INJECTION, SOLUTION INTRAMUSCULAR; INTRAVENOUS
Status: COMPLETED | OUTPATIENT
Start: 2022-08-10 | End: 2022-08-10

## 2022-08-10 RX ADMIN — DEXAMETHASONE SODIUM PHOSPHATE 10 MG: 10 INJECTION, SOLUTION INTRAMUSCULAR; INTRAVENOUS at 08:54

## 2022-08-10 RX ADMIN — BUPIVACAINE HYDROCHLORIDE 2 ML: 5 INJECTION, SOLUTION EPIDURAL; INTRACAUDAL; PERINEURAL at 08:54

## 2022-08-10 RX ADMIN — IOPAMIDOL 1 ML: 408 INJECTION, SOLUTION INTRATHECAL at 08:54

## 2022-08-10 RX ADMIN — LIDOCAINE HYDROCHLORIDE 5 ML: 10 INJECTION, SOLUTION EPIDURAL; INFILTRATION; INTRACAUDAL at 08:52

## 2022-08-10 ASSESSMENT — PAIN DESCRIPTION - DESCRIPTORS: DESCRIPTORS: SHOOTING;BURNING;SHARP

## 2022-08-10 ASSESSMENT — PAIN - FUNCTIONAL ASSESSMENT
PAIN_FUNCTIONAL_ASSESSMENT: PREVENTS OR INTERFERES SOME ACTIVE ACTIVITIES AND ADLS
PAIN_FUNCTIONAL_ASSESSMENT: 0-10
PAIN_FUNCTIONAL_ASSESSMENT: 0-10

## 2022-08-10 NOTE — OP NOTE
Pre Op Diagnoses: RightSacroiliac joint pain  Post Op Diagnoses:Right Sacroiliac joint pain     Procedure: RightSI joint steroid injection with flouro guidance     Blood Loss: None  Procedure: The Patient was seen in the preop area, chart was reviewed, informed consent was obtained. Patient was taken to procedure room and was placed in prone position. Vital signs were monitored through out the Procedure. A time out was completed. The skin over the back was prepped and draped in sterile manner. The target point was marked at the left SI joint. Skin and deep tissues were anesthetized with 1 % lidocaine. A 22 G spinal needlele was advanced under fluoroscopy guidance in AP view. Positon was confirmed by injecting small amount of contrast dye  Finally 3 ml of treatment solution containing 2 ml of 0.5 % Bupivacaine and 1 ml of Dexamethasone 10 mg was injected  The needle was removed and a Band-Aid was placed over the needle insertion site. The same procedure was then repeated on the other side with same technique, the remaining 1.5 ml of treatment solution was injected on that side. The patient's vital signs remained stable and the patient tolerated the procedure well.

## 2022-08-10 NOTE — DISCHARGE INSTRUCTIONS
Discharge Instructions following Sedation or Anesthesia:  You have  received  a sedative/anesthetic therefore, you should not consume any alcoholic beverages for minimum of 12 hours. Do not drive or operate machinery for 24 hours. Do not sign legal documents for 24 hours. Dizziness, drowsiness, and unsteadiness may occur. Rest when need to. Increase diet as tolerated. Keep up on fluids if diet allows. General Instructions:  Do not take a tub bath for 72 hours after procedure (this includes hot tubs and swimming pools). You may shower, but avoid hot water to injection site. Avoid strenuous activity TODAY especially if you experience dizziness. Remove band-aid the next day. Wash off any residual iodine   Do not use heat, heating pad, or any other heating device over the injection site for 3 days after the procedure. If you experience pain after your procedure, you may continue with your current pain medication as prescribed. (DO NOT INCREASE YOUR PAIN MEDICATION WITHOUT TALKING TO DOCTOR)  Soreness and pain at injection site is common, may use ice to reduce soreness. What To Expect After A Steroid Injection  You should start to feel the effects of the steroid injection in about 48-72 hours  You may experience facial flushing, night sweats and irritability. Other common steroid related side effects are increased appetite, mood elevation, insomnia and fluid retention. These effects usually subside in a few days. Steroids used in epidural injections may cause muscle spasms for a few days. If you are diabetic, your blood sugar may be elevated after your procedure due to the steroids. You will need to monitor your blood sugar more closely while going through a series of injections (Check blood sugar at meals and bedtime for 5 days). You may require adjustment in your diabetic medications, contact your PCP office to discuss.     Call Kenan Gaary at [phone] if you experience:   Fever, chills or temperature over 100    Vomiting, Headache, persistent stiff neck, nausea, blurred vision   Difficulty in urinating or unable to urinate with 8 hours   Increase in weakness, numbness or loss of function   Increased redness, swelling or drainage at the injection site

## 2022-08-26 ENCOUNTER — OFFICE VISIT (OUTPATIENT)
Dept: PAIN MANAGEMENT | Age: 62
End: 2022-08-26
Payer: COMMERCIAL

## 2022-08-26 VITALS
DIASTOLIC BLOOD PRESSURE: 83 MMHG | OXYGEN SATURATION: 97 % | BODY MASS INDEX: 24.64 KG/M2 | HEIGHT: 71 IN | HEART RATE: 52 BPM | WEIGHT: 176 LBS | SYSTOLIC BLOOD PRESSURE: 133 MMHG

## 2022-08-26 DIAGNOSIS — M51.36 DDD (DEGENERATIVE DISC DISEASE), LUMBAR: Chronic | ICD-10-CM

## 2022-08-26 DIAGNOSIS — M53.3 CHRONIC SI JOINT PAIN: Primary | Chronic | ICD-10-CM

## 2022-08-26 DIAGNOSIS — G89.29 CHRONIC SI JOINT PAIN: Primary | Chronic | ICD-10-CM

## 2022-08-26 PROCEDURE — 99213 OFFICE O/P EST LOW 20 MIN: CPT | Performed by: NURSE PRACTITIONER

## 2022-08-26 ASSESSMENT — ENCOUNTER SYMPTOMS
DIARRHEA: 0
CONSTIPATION: 0
WHEEZING: 0
VOMITING: 0
COUGH: 1
SHORTNESS OF BREATH: 0
NAUSEA: 0
BACK PAIN: 1

## 2022-08-26 NOTE — PROGRESS NOTES
Chief Complaint   Patient presents with    Back Pain    Follow Up After Procedure      RightSI joint steroid injection          PMH     73-year-old man with history of right-sided lower back pain  Patient is physically active and is an avid runner  Had recent flareup of right-sided lumbosacral area pain was treated with the oral steroid with some relief     Pain is constant sharp aggravated with standing and running  No dermatomal radiation in leg  No associated dermatomal numbness or paresthesia     In past he was diagnosed with lumbar degenerative disc disease and vertebral agenic low back pain for which he had basivertebral nerve ablation 8 months back that provided almost 100% improvement in axial lower back pain       Patient is here today for for f/u after right SI joint injectin and reports minimal relief. Still having pain   right lumbar facet diagnostic block at L3-S1  If that also fails then consider for lumbar epidural steroid injection    HPI:   Back Pain  This is a chronic problem. The current episode started more than 1 year ago. The problem occurs constantly. The problem has been gradually worsening since onset. The pain is present in the lumbar spine, sacro-iliac and gluteal (right). The pain is at a severity of 1/10. The pain is mild. The pain is Worse during the day. The symptoms are aggravated by lying down. Pertinent negatives include no chest pain, fever, headaches or numbness. He has tried home exercises for the symptoms. The treatment provided mild relief. Dx:  S/P: RightSI joint steroid injection       Outcome   Any improvement of activity?   No   Any side effects (appetite,leg cramping,facial fleshing):no   Increase of pain:  No  Pain score Today:  1  % of pain relief:10%  Pain diary (medial branch block): No    No results found for: LABA1C  No results found for: EAG                Past Medical History:   Diagnosis Date    Arthritis     in hip    Asthma     GERD (gastroesophageal reflux disease)        Past Surgical History:   Procedure Laterality Date    COLONOSCOPY  04/06/2018    polyp removed    COLONOSCOPY N/A 4/6/2018    COLONOSCOPY POLYPECTOMY SNARE/COLD BIOPSY performed by Khoi Turner MD at [address] [place] 11/16/2020    RIGHT L4 AND L5 EPIDURAL STEROID INJECTION performed by Yu Marley MD at [address]  N/A 10/28/2021    BASIVERTEBRAL NERVE NERVE RADIOFREQUENCY ABLATION INTRACEPT PROCEDURE at L4 / L5 / S1 performed by Yu Marley MD at [address]         Allergies   Allergen Reactions    Ciprofloxacin Dermatitis         Current Outpatient Medications:     fluticasone (FLONASE) 50 MCG/ACT nasal spray, 1 spray by Each Nostril route in the morning., Disp: 32 g, Rfl: 3    mometasone-formoterol (DULERA) 200-5 MCG/ACT inhaler, Inhale 2 puffs into the lungs 2 times daily, Disp: 90 each, Rfl: 3    ramipril (ALTACE) 1.25 MG capsule, TAKE 1 CAPSULE BY MOUTH ONE TIME A DAY, Disp: 90 capsule, Rfl: 1    tadalafil (CIALIS) 5 MG tablet, Take 1 tablet by mouth daily, Disp: 90 tablet, Rfl: 1    albuterol sulfate HFA (PROAIR HFA) 108 (90 Base) MCG/ACT inhaler, Inhale 2 puffs into the lungs every 6 hours as needed for Wheezing or Shortness of Breath, Disp: 3 each, Rfl: 0    SUMAtriptan (IMITREX) 100 MG tablet, TAKE ONE TABLET BY MOUTH ONCE AS NEEDED FOR MIGRAINE, Disp: 9 tablet, Rfl: 0    omeprazole (PRILOSEC) 20 MG delayed release capsule, TAKE 1 CAPSULE BY MOUTH 2 TIMES A DAY, Disp: 180 capsule, Rfl: 1    montelukast (SINGULAIR) 10 MG tablet, Take 1 tablet by mouth nightly, Disp: 90 tablet, Rfl: 3    methocarbamol (ROBAXIN) 500 MG tablet, , Disp: , Rfl:     Blood Pressure KIT, 1 each by Does not apply route daily, Disp: 1 kit, Rfl: 0    Family History   Problem Relation Age of Onset    No Known Problems Mother     Diabetes Father        Social History     Socioeconomic History Marital status:      Spouse name: Not on file    Number of children: Not on file    Years of education: Not on file    Highest education level: Not on file   Occupational History    Not on file   Tobacco Use    Smoking status: Former     Packs/day: 0.10     Years: 10.00     Pack years: 1.00     Types: Cigars, Cigarettes     Quit date:      Years since quittin.6    Smokeless tobacco: Never    Tobacco comments:     patient states 4 cigars a year    Vaping Use    Vaping Use: Never used   Substance and Sexual Activity    Alcohol use: Yes     Comment: socially    Drug use: No    Sexual activity: Yes   Other Topics Concern    Not on file   Social History Narrative    Not on file     Social Determinants of Health     Financial Resource Strain: Low Risk     Difficulty of Paying Living Expenses: Not hard at all   Food Insecurity: No Food Insecurity    Worried About Running Out of Food in the Last Year: Never true    Ran Out of Food in the Last Year: Never true   Transportation Needs: Not on file   Physical Activity: Not on file   Stress: Not on file   Social Connections: Not on file   Intimate Partner Violence: Not on file   Housing Stability: Not on file       Review of Systems:  Review of Systems   Constitutional: Negative for chills, fever and malaise/fatigue. Cardiovascular:  Negative for chest pain. Respiratory:  Positive for cough. Negative for shortness of breath and wheezing. 3.5 years now    Musculoskeletal:  Positive for back pain. Negative for falls, muscle cramps, muscle weakness, neck pain and stiffness. Gastrointestinal:  Negative for constipation, diarrhea, nausea and vomiting. Neurological:  Negative for dizziness, headaches, numbness and tremors. Physical Exam:  /83   Pulse 52   Ht 5' 11\" (1.803 m)   Wt 176 lb (79.8 kg)   SpO2 97%   BMI 24.55 kg/m²     Physical Exam  Cardiovascular:      Rate and Rhythm: Normal rate.    Pulmonary:      Effort: Pulmonary effort is normal.   Musculoskeletal:         General: Normal range of motion. Skin:     General: Skin is warm and dry. Neurological:      Mental Status: He is alert and oriented to person, place, and time. Assessment:  Problem List Items Addressed This Visit       Chronic SI joint pain - Primary (Chronic)    Relevant Orders    DC INJ DX/THER AGNT PARAVERT FACET JOINT, LUMBAR/SAC, 1ST LEVEL (Completed)    DDD (degenerative disc disease), lumbar (Chronic)    Relevant Orders    DC INJ DX/THER AGNT PARAVERT FACET JOINT, LUMBAR/SAC, 1ST LEVEL (Completed)          Treatment Plan:   Patient relates minimal relief from recent intervention   right lumbar facet diagnostic block at L3-S1 ordered      I have reviewed the chief complaint and history of present illness (including ROS and PFSH) and vital documentation by my staff and I agree with their documentation and have added where applicable.

## 2022-09-14 ENCOUNTER — HOSPITAL ENCOUNTER (OUTPATIENT)
Dept: PAIN MANAGEMENT | Facility: CLINIC | Age: 62
Discharge: HOME | End: 2022-09-14
Payer: COMMERCIAL

## 2022-09-14 VITALS
BODY MASS INDEX: 24.64 KG/M2 | DIASTOLIC BLOOD PRESSURE: 98 MMHG | WEIGHT: 176 LBS | HEIGHT: 71 IN | RESPIRATION RATE: 17 BRPM | SYSTOLIC BLOOD PRESSURE: 184 MMHG | OXYGEN SATURATION: 98 % | TEMPERATURE: 97.5 F | HEART RATE: 51 BPM

## 2022-09-14 DIAGNOSIS — R52 PAIN MANAGEMENT: ICD-10-CM

## 2022-09-14 DIAGNOSIS — M47.817 LUMBOSACRAL SPONDYLOSIS WITHOUT MYELOPATHY: Chronic | ICD-10-CM

## 2022-09-14 PROCEDURE — 64495 INJ PARAVERT F JNT L/S 3 LEV: CPT

## 2022-09-14 PROCEDURE — 64494 INJ PARAVERT F JNT L/S 2 LEV: CPT

## 2022-09-14 PROCEDURE — 2500000003 HC RX 250 WO HCPCS: Performed by: ANESTHESIOLOGY

## 2022-09-14 PROCEDURE — 64495 INJ PARAVERT F JNT L/S 3 LEV: CPT | Performed by: ANESTHESIOLOGY

## 2022-09-14 PROCEDURE — 64493 INJ PARAVERT F JNT L/S 1 LEV: CPT

## 2022-09-14 PROCEDURE — 64494 INJ PARAVERT F JNT L/S 2 LEV: CPT | Performed by: ANESTHESIOLOGY

## 2022-09-14 PROCEDURE — 64493 INJ PARAVERT F JNT L/S 1 LEV: CPT | Performed by: ANESTHESIOLOGY

## 2022-09-14 RX ORDER — BUPIVACAINE HYDROCHLORIDE 5 MG/ML
INJECTION, SOLUTION EPIDURAL; INTRACAUDAL
Status: COMPLETED | OUTPATIENT
Start: 2022-09-14 | End: 2022-09-14

## 2022-09-14 RX ORDER — LIDOCAINE HYDROCHLORIDE 10 MG/ML
INJECTION, SOLUTION EPIDURAL; INFILTRATION; INTRACAUDAL; PERINEURAL
Status: COMPLETED | OUTPATIENT
Start: 2022-09-14 | End: 2022-09-14

## 2022-09-14 RX ADMIN — LIDOCAINE HYDROCHLORIDE 5 ML: 10 INJECTION, SOLUTION EPIDURAL; INFILTRATION; INTRACAUDAL at 10:34

## 2022-09-14 RX ADMIN — BUPIVACAINE HYDROCHLORIDE 4 ML: 5 INJECTION, SOLUTION EPIDURAL; INTRACAUDAL; PERINEURAL at 10:34

## 2022-09-14 ASSESSMENT — PAIN - FUNCTIONAL ASSESSMENT
PAIN_FUNCTIONAL_ASSESSMENT: 0-10
PAIN_FUNCTIONAL_ASSESSMENT: NONE - DENIES PAIN
PAIN_FUNCTIONAL_ASSESSMENT: PREVENTS OR INTERFERES SOME ACTIVE ACTIVITIES AND ADLS

## 2022-09-14 ASSESSMENT — PAIN DESCRIPTION - DESCRIPTORS: DESCRIPTORS: STABBING

## 2022-09-14 NOTE — OP NOTE
Patient Name: Gretel Zafar   YOB: 1960  Room/Bed: Room/bed info not found  Medical Record Number: [id number]  Date: 9/14/2022       Sedation/ Anesthesia Plan:   intravenous sedation   as needed. Medications Planned:   midazolam (Versed) / Fentanyl  Intravenously  as needed. Preoperative Diagnosis: Lumbar spondylosis w/o myelopathy or radiculopathy  Postoperative Diagnosis: Lumbar spondylosis w/o myelopathy or radiculopathy  Blood Loss: none    Procedure Performed:  Right Lumbar Medial Branch nerve Blocks at the transverse processes of L3, L4, L5 and sacral  ala under fluoroscopy guidance    Procedure: The Patient was seen in the preop area, chart was reviewed, informed consent was obtained. Patient was taken to procedure room and was placed in prone position. Vital signs were monitored through out the  Procedure. A time out was completed. The skin over the back was prepped and draped in sterile manner. The target point was marked at the junction of Transverse process and superior articular process at the target levels. Skin and deep tissues were anesthetized with 1 % lidocaine. A 25-gauge needlele was advanced to the target spots under fluoroscopy guidance in AP / Lateral and Oblique views. Then after negative aspiration contrast dye was injected with live fluoroscopy in AP views that showed  spread of the contrast with no epidural space and no vascular runoff or intrathecal spread. Finally 0.5 ml of treatment solution 0.5 % bupivacaine  was injected at each level. The needle was removed and a Band-Aid was placed over the needle  insertion site. The patient's vital signs remained stable and the patient tolerated the procedure well.         Electronically signed by Cricket Cifuentes MD on 9/14/2022 at 10:55 AM

## 2022-09-14 NOTE — DISCHARGE INSTRUCTIONS
Discharge Instructions following Sedation or Anesthesia:  You have  received  a sedative/anesthetic therefore, you should not consume any alcoholic beverages for minimum of 12 hours. Do not drive or operate machinery for 24 hours. Do not sign legal documents for 24 hours. Dizziness, drowsiness, and unsteadiness may occur. Rest when need to. Increase diet as tolerated. Keep up on fluids if diet allows. General Instructions:  Do not take a tub bath for 72 hours after procedure (this includes hot tubs and swimming pools). You may shower, but avoid hot water to injection site. Avoid strenuous activity TODAY especially if you experience dizziness. Remove band-aid the next day. Wash off any residual iodine   Do not use heat, heating pad, or any other heating device over the injection site for 3 days after the procedure. If you experience pain after your procedure, you may continue with your current pain medication as prescribed. (DO NOT INCREASE YOUR PAIN MEDICATION WITHOUT TALKING TO DOCTOR)  Soreness and pain at injection site is common, may use ice to reduce soreness. Please complete pain diary as instructed.      Call Kenan Garay at [phone] if you experience:   Fever, chills or temperature over 100    Vomiting, Headache, persistent stiff neck, nausea, blurred vision   Difficulty in urinating or unable to urinate with 8 hours   Increase in weakness, numbness or loss of function   Increased redness, swelling or drainage at the injection site

## 2022-09-14 NOTE — H&P
UPDATE:  Office visit pain clinic in [facility] with all required elements of H&P dated 08/26/2022  Patient seen preop, chart reviewed,   No changes in medical history and health assessment since last evaluation. PE:  AAO x 3, in NAD, VSS,. Resp: breathing on RA, no respiratory distress  Cardiac: rate normal    Risk / Benefits explained to patient, patient agree to proceed with plan.   ASA 2  MP 2

## 2022-09-16 ENCOUNTER — TELEPHONE (OUTPATIENT)
Dept: PAIN MANAGEMENT | Age: 62
End: 2022-09-16

## 2022-09-16 NOTE — TELEPHONE ENCOUNTER
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

## 2022-09-30 ENCOUNTER — HOSPITAL ENCOUNTER (OUTPATIENT)
Dept: MRI IMAGING | Age: 62
Discharge: HOME | End: 2022-10-02
Payer: COMMERCIAL

## 2022-09-30 DIAGNOSIS — M54.50 CHRONIC BILATERAL LOW BACK PAIN WITHOUT SCIATICA: ICD-10-CM

## 2022-09-30 DIAGNOSIS — G89.29 CHRONIC BILATERAL LOW BACK PAIN WITHOUT SCIATICA: ICD-10-CM

## 2022-09-30 PROCEDURE — 72148 MRI LUMBAR SPINE W/O DYE: CPT

## 2022-10-05 DIAGNOSIS — Z12.5 SPECIAL SCREENING FOR MALIGNANT NEOPLASM OF PROSTATE: Primary | ICD-10-CM

## 2022-10-06 ENCOUNTER — HOSPITAL ENCOUNTER (OUTPATIENT)
Age: 62
Setting detail: SPECIMEN
Discharge: HOME | End: 2022-10-06

## 2022-10-06 DIAGNOSIS — Z12.5 SPECIAL SCREENING FOR MALIGNANT NEOPLASM OF PROSTATE: ICD-10-CM

## 2022-10-06 LAB — PROSTATE SPECIFIC ANTIGEN: 1.27 NG/ML

## 2022-10-14 ENCOUNTER — OFFICE VISIT (OUTPATIENT)
Dept: PAIN MANAGEMENT | Age: 62
End: 2022-10-14
Payer: COMMERCIAL

## 2022-10-14 VITALS
DIASTOLIC BLOOD PRESSURE: 87 MMHG | WEIGHT: 176 LBS | BODY MASS INDEX: 24.64 KG/M2 | OXYGEN SATURATION: 96 % | HEART RATE: 60 BPM | HEIGHT: 71 IN | SYSTOLIC BLOOD PRESSURE: 135 MMHG

## 2022-10-14 DIAGNOSIS — M54.89 VERTEBROGENIC PAIN: Chronic | ICD-10-CM

## 2022-10-14 DIAGNOSIS — M54.6 ACUTE BILATERAL THORACIC BACK PAIN: Primary | ICD-10-CM

## 2022-10-14 DIAGNOSIS — M47.817 LUMBOSACRAL SPONDYLOSIS WITHOUT MYELOPATHY: Chronic | ICD-10-CM

## 2022-10-14 PROCEDURE — 99213 OFFICE O/P EST LOW 20 MIN: CPT | Performed by: NURSE PRACTITIONER

## 2022-10-14 RX ORDER — METHYLPREDNISOLONE 4 MG/1
TABLET ORAL
Qty: 1 KIT | Refills: 0 | Status: SHIPPED | OUTPATIENT
Start: 2022-10-14 | End: 2022-10-20

## 2022-10-14 ASSESSMENT — ENCOUNTER SYMPTOMS
CONSTIPATION: 0
WHEEZING: 1
NAUSEA: 0
DIARRHEA: 0
BACK PAIN: 1
SORE THROAT: 0
SHORTNESS OF BREATH: 1
COUGH: 0
VOMITING: 0

## 2022-10-14 NOTE — PROGRESS NOTES
Chief Complaint   Patient presents with    Follow-up     MRI    Back Pain       PMH       65-year-old man with history of right-sided lower back pain  Patient is physically active and is an avid runner  In past he was diagnosed with lumbar degenerative disc disease and vertebrogenic low back pain for which he had basivertebral nerve ablation 12 months back that provided almost 100% improvement in axial lower back pain. This past summer he had flare up of lumbar pain wih failed SI joint injection and failed MBB. MRI was updated and showed degenerative changes at S1 level with MD  POC for INTRACEPT at S1. Pt would like to schedule    Main c.o today with worsening Right  thoracic pain over the last 3 weeks. worse with activity with no known injury       HPI:   Back Pain  This is a chronic problem. Pertinent negatives include no chest pain or fever.     Patient is here today for: MRI results of back pain  Pain level: 4  Character: sharp stabbing  Radiating: no  Weakness or numbness: no  Aggravating Factors: sitting  Alleviating Factors: walking  Constant or intermitting: constant  Bladder/bowel loss: no          Past Medical History:   Diagnosis Date    Arthritis     in hip    Asthma     GERD (gastroesophageal reflux disease)        Past Surgical History:   Procedure Laterality Date    COLONOSCOPY  04/06/2018    polyp removed    COLONOSCOPY N/A 4/6/2018    COLONOSCOPY POLYPECTOMY SNARE/COLD BIOPSY performed by Reyes Field, MD at [address] Right 11/16/2020    RIGHT L4 AND L5 EPIDURAL STEROID INJECTION performed by Whit Powell MD at [address] N/A 10/28/2021    BASIVERTEBRAL NERVE NERVE RADIOFREQUENCY ABLATION INTRACEPT PROCEDURE at L4 / L5 / S1 performed by Whit Powell MD at [address]         Allergies   Allergen Reactions    Ciprofloxacin Dermatitis         Current Outpatient Medications:     methylPREDNISolone (MEDROL DOSEPACK) 4 MG tablet, Take by mouth., Disp: 1 kit, Rfl: 0    fluticasone (FLONASE) 50 MCG/ACT nasal spray, 1 spray by Each Nostril route in the morning., Disp: 32 g, Rfl: 3    mometasone-formoterol (DULERA) 200-5 MCG/ACT inhaler, Inhale 2 puffs into the lungs 2 times daily, Disp: 90 each, Rfl: 3    ramipril (ALTACE) 1.25 MG capsule, TAKE 1 CAPSULE BY MOUTH ONE TIME A DAY, Disp: 90 capsule, Rfl: 1    tadalafil (CIALIS) 5 MG tablet, Take 1 tablet by mouth daily, Disp: 90 tablet, Rfl: 1    albuterol sulfate HFA (PROAIR HFA) 108 (90 Base) MCG/ACT inhaler, Inhale 2 puffs into the lungs every 6 hours as needed for Wheezing or Shortness of Breath, Disp: 3 each, Rfl: 0    SUMAtriptan (IMITREX) 100 MG tablet, TAKE ONE TABLET BY MOUTH ONCE AS NEEDED FOR MIGRAINE, Disp: 9 tablet, Rfl: 0    omeprazole (PRILOSEC) 20 MG delayed release capsule, TAKE 1 CAPSULE BY MOUTH 2 TIMES A DAY, Disp: 180 capsule, Rfl: 1    montelukast (SINGULAIR) 10 MG tablet, Take 1 tablet by mouth nightly, Disp: 90 tablet, Rfl: 3    methocarbamol (ROBAXIN) 500 MG tablet, , Disp: , Rfl:     Blood Pressure KIT, 1 each by Does not apply route daily, Disp: 1 kit, Rfl: 0    Family History   Problem Relation Age of Onset    No Known Problems Mother     Diabetes Father        Social History     Socioeconomic History    Marital status:      Spouse name: Not on file    Number of children: Not on file    Years of education: Not on file    Highest education level: Not on file   Occupational History    Not on file   Tobacco Use    Smoking status: Former     Packs/day: 0.10     Years: 10.00     Pack years: 1.00     Types: Cigars, Cigarettes     Quit date:      Years since quittin.7    Smokeless tobacco: Never    Tobacco comments:     patient states 4 cigars a year    Vaping Use    Vaping Use: Never used   Substance and Sexual Activity    Alcohol use: Yes     Comment: socially    Drug use: No    Sexual activity: Yes   Other Topics Concern    Not on file   Social History Narrative    Not on file     Social Determinants of Health     Financial Resource Strain: Low Risk     Difficulty of Paying Living Expenses: Not hard at all   Food Insecurity: No Food Insecurity    Worried About Running Out of Food in the Last Year: Never true    Ran Out of Food in the Last Year: Never true   Transportation Needs: Not on file   Physical Activity: Not on file   Stress: Not on file   Social Connections: Not on file   Intimate Partner Violence: Not on file   Housing Stability: Not on file       Review of Systems:  Review of Systems   Constitutional: Negative for chills and fever. HENT:  Negative for congestion and sore throat. Cardiovascular:  Negative for chest pain. Respiratory:  Positive for shortness of breath and wheezing. Negative for cough. Musculoskeletal:  Positive for back pain. Gastrointestinal:  Negative for constipation, diarrhea, nausea and vomiting. Physical Exam:  /87   Pulse 60   Ht 5' 11\" (1.803 m)   Wt 176 lb (79.8 kg)   SpO2 96%   BMI 24.55 kg/m²     Physical Exam  Cardiovascular:      Rate and Rhythm: Normal rate. Pulmonary:      Effort: Pulmonary effort is normal.   Musculoskeletal:         General: Normal range of motion. Back:    Skin:     General: Skin is warm and dry. Neurological:      Mental Status: He is alert and oriented to person, place, and time. Assessment:  Problem List Items Addressed This Visit       Lumbosacral spondylosis without myelopathy (Chronic)    Relevant Medications    methylPREDNISolone (MEDROL DOSEPACK) 4 MG tablet    Vertebrogenic pain (Chronic)     Other Visit Diagnoses       Acute bilateral thoracic back pain    -  Primary    Relevant Orders    MRI THORACIC SPINE WO CONTRAST               Treatment Plan:    Thoracic  MRI ordered today to further evaluate pathology and guide treatment plan.  Consider intervention and/or surgical consult based on findings  Msg to MD re  scheduling intracept procedure   F/u in 4 weeks with MD for options    I have reviewed the chief complaint and history of present illness (including ROS and PFSH) and vital documentation by my staff and I agree with their documentation and have added where applicable.

## 2022-10-22 RX ORDER — FLUTICASONE PROPIONATE 50 MCG
1 SPRAY, SUSPENSION (ML) NASAL DAILY
Qty: 32 G | Refills: 3 | Status: SHIPPED | OUTPATIENT
Start: 2022-10-22

## 2022-10-26 ENCOUNTER — HOSPITAL ENCOUNTER (OUTPATIENT)
Dept: MRI IMAGING | Age: 62
Discharge: HOME | End: 2022-10-28
Payer: COMMERCIAL

## 2022-10-26 DIAGNOSIS — M54.6 ACUTE BILATERAL THORACIC BACK PAIN: ICD-10-CM

## 2022-10-26 PROCEDURE — 72146 MRI CHEST SPINE W/O DYE: CPT

## 2022-10-31 ENCOUNTER — OFFICE VISIT (OUTPATIENT)
Dept: UROLOGY | Age: 62
End: 2022-10-31
Payer: COMMERCIAL

## 2022-10-31 VITALS
HEART RATE: 60 BPM | BODY MASS INDEX: 24.64 KG/M2 | DIASTOLIC BLOOD PRESSURE: 70 MMHG | WEIGHT: 176 LBS | HEIGHT: 71 IN | OXYGEN SATURATION: 96 % | SYSTOLIC BLOOD PRESSURE: 120 MMHG

## 2022-10-31 DIAGNOSIS — R35.1 NOCTURIA: ICD-10-CM

## 2022-10-31 DIAGNOSIS — N52.01 ERECTILE DYSFUNCTION DUE TO ARTERIAL INSUFFICIENCY: Primary | ICD-10-CM

## 2022-10-31 DIAGNOSIS — Z12.5 SCREENING FOR PROSTATE CANCER: ICD-10-CM

## 2022-10-31 PROCEDURE — 99213 OFFICE O/P EST LOW 20 MIN: CPT | Performed by: UROLOGY

## 2022-10-31 ASSESSMENT — ENCOUNTER SYMPTOMS
SHORTNESS OF BREATH: 0
COUGH: 0
WHEEZING: 0

## 2022-10-31 NOTE — PROGRESS NOTES
[address] [address]  Dept: [address] [facility] Urology Office Note - Established    Patient:  Chava Zimmer  YOB: 1960  Date: 10/31/2022    The patient is a 63 y.o. male who presents todayfor evaluation of the following problems:   Chief Complaint   Patient presents with    1 Year Follow Up     Prostate check       HPI  This is a very pleasant 82-year-old gentleman has a history of erectile dysfunction. He has been on tadalafil 5 mg daily and continues to do well with. He has no bothersome urinary symptoms. His recent PSA is 1.27, which is stable over the last few years. Summary of old records: N/A    Additional History: N/A    Procedures Today: N/A    Urinalysis today:  No results found for this visit on 10/31/22. Last several PSA's:  Lab Results   Component Value Date    PSA 1.27 10/06/2022    PSA 1.37 2021    PSA 1.25 11/15/2019     Last total testosterone:  No results found for: TESTOSTERONE    AUA Symptom Score (10/31/2022):                                Last BUN and creatinine:  Lab Results   Component Value Date    BUN 17 2017     Lab Results   Component Value Date    CREATININE 0.79 2017       Additional Lab/Culture results: none    Imaging Reviewed during this Office Visit: none    (results were independently reviewed by physician and radiology report verified)    PAST MEDICAL, FAMILY AND SOCIAL HISTORY UPDATE:  Past Medical History:   Diagnosis Date    Arthritis     in hip    Asthma     GERD (gastroesophageal reflux disease)      Past Surgical History:   Procedure Laterality Date    COLONOSCOPY  2018    polyp removed    COLONOSCOPY N/A 2018    COLONOSCOPY POLYPECTOMY SNARE/COLD BIOPSY performed by Foreign Ríos MD at [facility] Right 2020    RIGHT L4 AND L5 EPIDURAL STEROID INJECTION performed by Roderick Mosqueda MD at [facility] N/A 10/28/2021    BASIVERTEBRAL NERVE NERVE RADIOFREQUENCY ABLATION INTRACEPT PROCEDURE at L4 / L5 / S1 performed by Roderick Mosqueda MD at [address]       Family History   Problem Relation Age of Onset    No Known Problems Mother     Diabetes Father      Outpatient Medications Marked as Taking for the 10/31/22 encounter (Office Visit) with Laura Singh MD   Medication Sig Dispense Refill    fluticasone (FLONASE) 50 MCG/ACT nasal spray 1 spray by Each Nostril route daily 32 g 3    mometasone-formoterol (DULERA) 200-5 MCG/ACT inhaler Inhale 2 puffs into the lungs 2 times daily 90 each 3    ramipril (ALTACE) 1.25 MG capsule TAKE 1 CAPSULE BY MOUTH ONE TIME A DAY 90 capsule 1    tadalafil (CIALIS) 5 MG tablet Take 1 tablet by mouth daily 90 tablet 1    albuterol sulfate HFA (PROAIR HFA) 108 (90 Base) MCG/ACT inhaler Inhale 2 puffs into the lungs every 6 hours as needed for Wheezing or Shortness of Breath 3 each 0    SUMAtriptan (IMITREX) 100 MG tablet TAKE ONE TABLET BY MOUTH ONCE AS NEEDED FOR MIGRAINE 9 tablet 0    omeprazole (PRILOSEC) 20 MG delayed release capsule TAKE 1 CAPSULE BY MOUTH 2 TIMES A  capsule 1    montelukast (SINGULAIR) 10 MG tablet Take 1 tablet by mouth nightly 90 tablet 3    methocarbamol (ROBAXIN) 500 MG tablet       Blood Pressure KIT 1 each by Does not apply route daily 1 kit 0       Ciprofloxacin  Social History     Tobacco Use   Smoking Status Former    Packs/day: 0.10    Years: 10.00    Pack years: 1.00    Types: Cigars, Cigarettes    Quit date:     Years since quittin.8   Smokeless Tobacco Never   Tobacco Comments    patient states 4 cigars a year      (Ifpatient a smoker, smoking cessation counseling offered)    Social History     Substance and Sexual Activity   Alcohol Use Yes    Comment: socially       REVIEW OF SYSTEMS:  Review of Systems    Physical Exam:      Vitals: 10/31/22 1452   BP: 120/70   Pulse: 60   SpO2: 96%     Body mass index is 24.55 kg/m². Patient is a 63 y.o. male in no acute distress and alert and oriented to person, place and time. Physical Exam  Constitutional: Patient in no acute distress. Neuro: Alert and oriented to person, place and time. Psych: Mood normal, affect normal  Skin: No rash noted  HEENT: Head: Normocephalic andatraumatic  Conjunctivae and EOM are normal. Pupils are equal, round  Nose:Normal  Right External Ear: Normal; Left External Ear: Normal  Mouth: Mucosa Moist  Neck: Supple      Assessment and Plan      1. Erectile dysfunction due to arterial insufficiency    2. Nocturia    3. Screening for prostate cancer           Plan:   F/u 1 year psa  Cont tadalafil      Return in about 1 year (around 10/31/2023) for psa. Prescriptions Ordered:  No orders of the defined types were placed in this encounter. Orders Placed:  Orders Placed This Encounter   Procedures    PSA, Diagnostic     Standing Status:   Future     Standing Expiration Date:   10/31/2023             Camilla Bundy MD    Agree with the ROS entered by the MA.

## 2022-11-08 ENCOUNTER — OFFICE VISIT (OUTPATIENT)
Dept: PAIN MANAGEMENT | Age: 62
End: 2022-11-08
Payer: COMMERCIAL

## 2022-11-08 VITALS
OXYGEN SATURATION: 95 % | WEIGHT: 176 LBS | SYSTOLIC BLOOD PRESSURE: 138 MMHG | DIASTOLIC BLOOD PRESSURE: 80 MMHG | HEART RATE: 60 BPM | BODY MASS INDEX: 24.64 KG/M2 | HEIGHT: 71 IN

## 2022-11-08 DIAGNOSIS — M54.9 MID BACK PAIN ON RIGHT SIDE: Primary | ICD-10-CM

## 2022-11-08 PROCEDURE — 99214 OFFICE O/P EST MOD 30 MIN: CPT | Performed by: ANESTHESIOLOGY

## 2022-11-08 RX ORDER — BACLOFEN 10 MG/1
10 TABLET ORAL NIGHTLY
Qty: 30 TABLET | Refills: 0 | Status: SHIPPED | OUTPATIENT
Start: 2022-11-08 | End: 2022-12-08

## 2022-11-08 ASSESSMENT — ENCOUNTER SYMPTOMS
BACK PAIN: 1
NAUSEA: 0
VOMITING: 0
CONSTIPATION: 0
DIARRHEA: 0
SORE THROAT: 0

## 2022-11-08 NOTE — PROGRESS NOTES
The patient is a 63 y. o. Non- / non  male. Chief Complaint   Patient presents with    Back Pain    Follow-up     Next step        Back Pain  Pertinent negatives include no fever.      Patient is here today for: back pain  Pain level: 2/10  Character: stabbing  Radiating: no  Weakness or numbness: no  Aggravating Factors: sitting, positions  Alleviating Factors: medications  Constant or intermitting: constant   Bladder/bowel loss: no     Past Medical History:   Diagnosis Date    Arthritis     in hip    Asthma     GERD (gastroesophageal reflux disease)         Past Surgical History:   Procedure Laterality Date    COLONOSCOPY  2018    polyp removed    COLONOSCOPY N/A 2018    COLONOSCOPY POLYPECTOMY SNARE/COLD BIOPSY performed by Gallito Callejas MD at [facility] Right 2020    RIGHT L4 AND L5 EPIDURAL STEROID INJECTION performed by Neeraj Patterson MD at [facility] N/A 10/28/2021    BASIVERTEBRAL NERVE NERVE RADIOFREQUENCY ABLATION INTRACEPT PROCEDURE at L4 / L5 / S1 performed by Neeraj Patterson MD at [place] [address] History     Socioeconomic History    Marital status:    Tobacco Use    Smoking status: Former     Packs/day: 0.10     Years: 10.00     Pack years: 1.00     Types: Cigars, Cigarettes     Quit date:      Years since quittin.8    Smokeless tobacco: Never    Tobacco comments:     patient states 4 cigars a year    Vaping Use    Vaping Use: Never used   Substance and Sexual Activity    Alcohol use: Yes     Comment: socially    Drug use: No    Sexual activity: Yes     Social Determinants of Health     Financial Resource Strain: Low Risk     Difficulty of Paying Living Expenses: Not hard at all   Food Insecurity: No Food Insecurity    Worried About Running Out of Food in the Last Year: Never true    Ran Out of Food in the Last Year: Never true       Family History   Problem Relation Age of Onset    No Known Problems Mother     Diabetes Father        Allergies   Allergen Reactions    Ciprofloxacin Dermatitis       There were no vitals filed for this visit. Current Outpatient Medications   Medication Sig Dispense Refill    fluticasone (FLONASE) 50 MCG/ACT nasal spray 1 spray by Each Nostril route daily 32 g 3    mometasone-formoterol (DULERA) 200-5 MCG/ACT inhaler Inhale 2 puffs into the lungs 2 times daily 90 each 3    ramipril (ALTACE) 1.25 MG capsule TAKE 1 CAPSULE BY MOUTH ONE TIME A DAY 90 capsule 1    tadalafil (CIALIS) 5 MG tablet Take 1 tablet by mouth daily 90 tablet 1    albuterol sulfate HFA (PROAIR HFA) 108 (90 Base) MCG/ACT inhaler Inhale 2 puffs into the lungs every 6 hours as needed for Wheezing or Shortness of Breath 3 each 0    SUMAtriptan (IMITREX) 100 MG tablet TAKE ONE TABLET BY MOUTH ONCE AS NEEDED FOR MIGRAINE 9 tablet 0    omeprazole (PRILOSEC) 20 MG delayed release capsule TAKE 1 CAPSULE BY MOUTH 2 TIMES A  capsule 1    montelukast (SINGULAIR) 10 MG tablet Take 1 tablet by mouth nightly 90 tablet 3    methocarbamol (ROBAXIN) 500 MG tablet       Blood Pressure KIT 1 each by Does not apply route daily 1 kit 0     No current facility-administered medications for this visit. Review of Systems   Constitutional:  Negative for chills and fever. HENT:  Negative for congestion and sore throat. Gastrointestinal:  Negative for constipation, diarrhea, nausea and vomiting. Musculoskeletal:  Positive for back pain and gait problem. Objective:  Vital signs: (most recent): Height 5' 11\" (1.803 m), weight 176 lb (79.8 kg). No fever. Assessment & Plan  No diagnosis found. No orders of the defined types were placed in this encounter. No orders of the defined types were placed in this encounter.            Electronically signed by Stiven Grant MA on 11/8/2022 at 4:02 PM

## 2022-11-08 NOTE — PROGRESS NOTES
The patient is a 63 y. o. Non- / non  male. Chief Complaint   Patient presents with    Back Pain    Follow-up     Next step        Back Pain  Pertinent negatives include no fever.    60-year-old man physically active runs regularly and swims regularly  Had recent flareup of pain  Pain located on the right side in the mid back region extends under the shoulder blade  No dermatomal radiation in leg  No associated numbness or paresthesia  No extension of pain in the lumbar or sacral area  Pain aggravated with excessive activity  Had recent MRI thoracic and lumbar spine here for review of the imaging and discuss treatment option  Patient is here today for: back pain  Pain level: 2/10  Character: stabbing  Radiating: no  Weakness or numbness: no  Aggravating Factors: sitting, positions  Alleviating Factors: medications  Constant or intermitting: constant   Bladder/bowel loss: no     Past Medical History:   Diagnosis Date    Arthritis     in hip    Asthma     GERD (gastroesophageal reflux disease)         Past Surgical History:   Procedure Laterality Date    COLONOSCOPY  04/06/2018    polyp removed    COLONOSCOPY N/A 4/6/2018    COLONOSCOPY POLYPECTOMY SNARE/COLD BIOPSY performed by Reyes Field, MD at [address] Right 11/16/2020    RIGHT L4 AND L5 EPIDURAL STEROID INJECTION performed by Whit Powell MD at [address] N/A 10/28/2021    BASIVERTEBRAL NERVE NERVE RADIOFREQUENCY ABLATION INTRACEPT PROCEDURE at L4 / L5 / S1 performed by Whit Powell MD at [place] [address] History     Socioeconomic History    Marital status:      Spouse name: None    Number of children: None    Years of education: None    Highest education level: None   Tobacco Use    Smoking status: Former     Packs/day: 0.10     Years: 10.00     Pack years: 1.00     Types: Cigars, Cigarettes     Quit date: 2011     Years since quittin.8    Smokeless tobacco: Never    Tobacco comments:     patient states 4 cigars a year    Vaping Use    Vaping Use: Never used   Substance and Sexual Activity    Alcohol use: Yes     Comment: socially    Drug use: No    Sexual activity: Yes     Social Determinants of Health     Financial Resource Strain: Low Risk     Difficulty of Paying Living Expenses: Not hard at all   Food Insecurity: No Food Insecurity    Worried About Running Out of Food in the Last Year: Never true    Ran Out of Food in the Last Year: Never true       Family History   Problem Relation Age of Onset    No Known Problems Mother     Diabetes Father        Allergies   Allergen Reactions    Ciprofloxacin Dermatitis       Vitals:    22 1602   BP: 138/80   Pulse: 60   SpO2: 95%       Current Outpatient Medications   Medication Sig Dispense Refill    baclofen (LIORESAL) 10 MG tablet Take 1 tablet by mouth nightly 30 tablet 0    fluticasone (FLONASE) 50 MCG/ACT nasal spray 1 spray by Each Nostril route daily 32 g 3    mometasone-formoterol (DULERA) 200-5 MCG/ACT inhaler Inhale 2 puffs into the lungs 2 times daily 90 each 3    ramipril (ALTACE) 1.25 MG capsule TAKE 1 CAPSULE BY MOUTH ONE TIME A DAY 90 capsule 1    tadalafil (CIALIS) 5 MG tablet Take 1 tablet by mouth daily 90 tablet 1    albuterol sulfate HFA (PROAIR HFA) 108 (90 Base) MCG/ACT inhaler Inhale 2 puffs into the lungs every 6 hours as needed for Wheezing or Shortness of Breath 3 each 0    SUMAtriptan (IMITREX) 100 MG tablet TAKE ONE TABLET BY MOUTH ONCE AS NEEDED FOR MIGRAINE 9 tablet 0    omeprazole (PRILOSEC) 20 MG delayed release capsule TAKE 1 CAPSULE BY MOUTH 2 TIMES A  capsule 1    montelukast (SINGULAIR) 10 MG tablet Take 1 tablet by mouth nightly 90 tablet 3    methocarbamol (ROBAXIN) 500 MG tablet       Blood Pressure KIT 1 each by Does not apply route daily 1 kit 0     No current facility-administered medications for this visit.        Review of Systems Constitutional:  Negative for chills and fever. HENT:  Negative for congestion and sore throat. Gastrointestinal:  Negative for constipation, diarrhea, nausea and vomiting. Musculoskeletal:  Positive for back pain and gait problem. Objective:  General Appearance:  Well-appearing, in no acute distress, uncomfortable and in pain. Vital signs: (most recent): Blood pressure 138/80, pulse 60, height 5' 11\" (1.803 m), weight 176 lb (79.8 kg), SpO2 95 %. Vital signs are normal.  No fever. Output: Producing urine and producing stool. HEENT: Normal HEENT exam.    Lungs:  Normal effort and normal respiratory rate. Breath sounds clear to auscultation. He is not in respiratory distress. Heart: Normal rate. Extremities: Normal range of motion. There is no deformity. Neurological: Patient is alert and oriented to person, place and time. Patient has normal coordination. Pupils:  Pupils are equal, round, and reactive to light. Pupils are equal.   Skin:  Warm and dry. No rash or cyanosis. Assessment & Plan    Mri Thoracic spine  MRI lumbar spine  Reports reviewed  Images reviewed independently  Finding discussed in detail with patient  Mild degenerative changes at L5-S1  Successful lesioning of L4 and L5 basivertebral nerve intercept procedure for Modic changes done in past  No significant stenosis    Explained to patient that I believe the pain is muscular  I think he can benefit from dry needling  Will try bedtime muscle relaxant    1.  Mid back pain on right side        Orders Placed This Encounter   Procedures    Ambulatory referral to Physical Therapy        Orders Placed This Encounter   Medications    baclofen (LIORESAL) 10 MG tablet     Sig: Take 1 tablet by mouth nightly     Dispense:  30 tablet     Refill:  0            Electronically signed by Raymond Terry MD on 11/8/2022 at 4:17 PM

## 2022-11-17 ENCOUNTER — HOSPITAL ENCOUNTER (OUTPATIENT)
Dept: PHYSICAL THERAPY | Facility: CLINIC | Age: 62
Setting detail: THERAPIES SERIES
Discharge: HOME | End: 2022-11-17
Payer: COMMERCIAL

## 2022-11-17 PROCEDURE — 97110 THERAPEUTIC EXERCISES: CPT

## 2022-11-17 PROCEDURE — 97140 MANUAL THERAPY 1/> REGIONS: CPT

## 2022-11-17 PROCEDURE — 97161 PT EVAL LOW COMPLEX 20 MIN: CPT

## 2022-11-17 NOTE — CONSULTS
[] [facility]) [facility] &  Therapy  [address]  [phone]  F: [phone] [] [address]  [facility]   [address]  P: [phone]  F: [phone] [] [address] &  Therapy  [address]  P: [phone]  F: [phone] [x] [address]  P: [phone]  F: [phone] [] [address]  [facility]   [address]   [place]: [phone]  F: [phone]      Physical Therapy Spine Evaluation    Date:  2022  Patient: Rosanna Closs  : 1960  MRN: [id number]  Physician: Roderick Mosqueda MD     Insurance: [facility] ($0 co-pay, $1000 Deductible)  Medical Diagnosis:      M54.9 (ICD-10-CM) - Mid back pain on right side   Rehab Codes: Mid back pain M54.9  Onset Date: 2022  Next 's appt.: 22    Subjective:   CC: Right sided mid back pain  HPI: The patient is a triathlete. He was swimming and experienced a sharp pain on the right side of his mid back while punching off of the wall. The pain has subsided slightly but has not resolved. He has been unable to swim and has increased pain with prolonged sitting. PMHx: [] Unremarkable [] Diabetes [] HTN  [] Pacemaker   [] MI/Heart Problems [] Cancer [] Arthritis [x] Other: Chronic LBP with prior RFA              [x] Refer to full medical chart  In EPIC       Comorbidities:   [] Obesity [] Dialysis  [] N/A   [x] Asthma/COPD [] Dementia [] Other:   [] Stroke [] Sleep apnea [] Other:   [] Vascular disease [] Rheumatic disease [] Other:     Tests: [] X-Ray: [x] MRI:  Impression   1. No acute fracture of the thoracic spine evident. 2. Exaggerated midthoracic kyphosis. There is no significant spinal canal   stenosis or neural foraminal narrowing evident. Impression   Multilevel degenerative disc disease without canal stenosis. Foraminal narrowing in the lower lumbar spine as described above. [] Other:    Medications: [x] Refer to full medical record [] None [] Other:  Allergies:      [x] Refer to full medical record [] None [] Other:    Function:  Hand Dominance  [x] Right  [] Left  Patient lives with: wife   In what type of home []  One story   [x] Two story   [] Split level   Number of stairs to enter    With handrail on the []  Right to enter   [] Left to enter   Bathroom has a []  Tub only  [x] Tub/shower combo   [] Walk in shower    []  Grab bars   Washing machine is on []  Main level   [] Second level   [x] Basement   Employer [employer]   Job Status [x]  Normal duty   [] Light duty   [] Off due to condition    []  Retired   [] Not employed   [] Disability  [] Other:  []  Return to work:    Work activities/duties Sales       ADL/IADL Previous level of function Current level of function Who currently assists the patient with task   Bathing  [x] Independent  [] Assist [x] Independent  [] Assist    Dress/grooming [x] Independent  [] Assist [x] Independent  [] Assist    Transfer/mobility [x] Independent  [] Assist [x] Independent  [] Assist    Feeding [x] Independent  [] Assist [x] Independent  [] Assist    Toileting [x] Independent  [] Assist [x] Independent  [] Assist    Driving [x] Independent  [] Assist [x] Independent  [] Assist    Housekeeping [x] Independent  [] Assist [x] Independent  [] Assist    Grocery shop/meal prep [x] Independent  [] Assist [x] Independent  [] Assist      Gait Prior level of function Current level of function    [x] Independent  [] Assist [x] Independent  [] Assist   Device: [x] Independent [x] Independent    [] Straight Cane [] Quad cane [] Straight Cane [] Quad cane    [] Standard walker [] Rolling walker   [] 4 wheeled walker [] Standard walker [] Rolling walker   [] 4 wheeled walker    [] Wheelchair [] Wheelchair Pain:  [x] Yes  [] No Location: right mid back Pain Rating: (0-10 scale) 3/10  Pain altered Tx:  [] Yes  [x] No  Action:    Symptoms:  [] Improving [] Worsening [x] Same  Better:  [x] AM    [] PM    [] Sit    [] Rise/Sit    []Stand    [] Walk    [] Lying    [] Other:  Worse: [] AM    [x] PM    [x] Sit    [] Rise/Sit    []Stand    [] Walk    [] Lying    [] Bend                      [] Valsalva    [x] Other: swimming  Sleep: [x] OK    [] Disturbed    Objective:      STRENGTH  STRENGTH  ROM    Left Right  Left Right Cervical    C5 Shld Abd 5 5 L1-2 Hip Flex   Flexion    Shld Flexion   Hip Abd   Extension    Shld IR   L3-4 Knee Ext   Rotation L R   Shld ER   L4 Ankle DF   Sidebend L R   C6 Elb Flex 5 5 L5 EHL   Retraction    C7 Elb Ext 5 5 S1 Plant. Flex   Lumbar    C8 EPL 5 5 Abdominals   Flexion 100%   T1 Fing Abd 5 5 Erector Spinae   Extension 100%         Rotation L 100% R 100%         Sidebend L 100% R 100%         UE/LE                                                              TESTS (+/-) LEFT RIGHT Not Tested   SLR [] sit [] supine   [x]   Hamstring (SLR)   [x]   SKTC   [x]   DKTC   [x]   Slump/Dural - - []   SI JT   [x]   FAIZA   [x]   Joint Mobility +stiff T6-8  []   Cerv. Comp   [x]   Cerv. Distraction   [x]   Cerv. Alar/Transverse   [x]   Vertebral Artery   [x]   Adsons   [x]   Fareed Grave   [x]   Perico Tests ? Pain ?  Pain No Change Not Tested   RFIS [] [] [x] []   CHINTAN [] [] [x] []   RFIL [] [] [x] []   REIL [] [] [x] []   Rep Prot [] [] [x] []   Rep Retract [] [] [x] []       OBSERVATION No Deficit Deficit Not Tested Comments   Posture    Fwd shoulders with excessive kyphosis   Forward Head [] [x] []    Rounded Shoulders [] [x] []    Kyphosis [] [x] []    Lordosis [] [x] []    Lateral Shift [x] [] []    Scoliosis [x] [] []    Iliac Crest [] [] [x]    PSIS [] [] [x]    ASIS [] [] [x]    Genu Valgus [] [] [x]    Genu Varus [] [] [x]    Genu Recurvatum [] [] [x]    Pronation [] [] [x]    Supination [] [] [x]    Leg Length Discrp [] [] [x]    Slumped Sitting [] [] [x]    Palpation [] [x] [] Hypertonicity and TTP to right thoracic erector spinae   Sensation [x] [] []    Edema [x] [] []    Neurological [x] [] []        Functional Test: Oswestry Score: 18% functionally impaired       Assessment:  The patient is a 63year old with a chief complaint of right sided thoracic pain and signs and symptoms consistent with a diagnosis of right thoracic pain. He presents with pain, no weakness, normal range of motion, postural deficits, hypertonicity and functional limitations. He will benefit from skilled PT to address above deficits. Problem list, as detailed above:   [x] ? Back Pain:    [] ? Cervical Pain:    [] ? ROM:     [] ? Strength:   [x] ? Function:  [x] Postural Deviations  [] Gait Deviations  [] Other:     STG: (to be met in 5 treatments)  ? Pain: 1/10  Patient to be independent with home exercise program as demonstrated by performance with correct form without cues. LTG: (to be met in 10 treatments)  Patient will resume swimming pain free  Patient will sit greater than one hour without increase in back pain  Patient will improve Oswestry by >10%      Patient goals: To be pain free     Rehab Potential:  [x] Good  [] Fair  [] Poor   Suggested Professional Referral:  [x] No  [] Yes:  Barriers to Goal Achievement:  [x] No  [] Yes:  Domestic Concerns:  [x] No  [] Yes:    Pt. Education:  [x] Plans/Goals, Risks/Benefits discussed  [x] Home exercise program  Method of Education: [x] Verbal  [x] Demo  [] Written  Comprehension of Education:  [] Verbalizes understanding. [x] Demonstrates understanding. [] Needs Review. [] Demonstrates/verbalizes understanding of HEP/Ed previously given.     Treatment Plan:  [x] Therapeutic Exercise   56759  [] Iontophoresis: 4 mg/mL Dexamethasone Sodium Phosphate  mAmin  82095   [] Therapeutic Activity  35975 [] Vasopneumatic cold with compression  17057    [] Gait Training   52430 [] Ultrasound   [id number]   [x] Neuromuscular Re-education  [id number] [] Electrical Stimulation Unattended  88034   [x] Manual Therapy  38499 [] Electrical Stimulation Attended  [id number]   [x] Instruction in HEP  [] Lumbar/Cervical Traction  [id number]   [] Aquatic Therapy   38103 [x] Cold/hotpack    [] Massage   23488      [x] Dry Needling, 1 or 2 muscles  55346   [] Biofeedback, first 15 minutes   05698  [] Biofeedback, additional 15 minutes   40935 [x] Dry Needling, 3 or more muscles  95012       Frequency:  2 x/week for 10 visits    Todays Treatment:  Modalities:   Precautions: none  Exercises:  Exercise Reps/ Time Weight/ Level Comments   LTR 2x10     Cat cow  10     Open book 10     Roller self mob 1'           Other:  Manual:   Prone MFR to right erector spinae  Grade V thoracic flexion manipulation T6-10    Patient was informed of the potential benefits of Dry Needling. Patient was informed of risks including but not limited to drowsiness, dizziness, minor bruising or bleeding, temporary pain, tingling, numbness and a low risk of pneumothorax. Patient gave verbal consent to proceed and signed a Dry Needling Acknowledgement form. Dry Needling performed in conjunction with Manual therapy to promote tissue extensibility, improve ROM, and reduce pain. No charge submitted for the time the needle was inserted. 5 right thoracic erector spinae trigger points treated with a 25 mm needle using a bony backdrop for safety. Multiple twitch responses produced. Specific Instructions for next treatment: MT and Dry Needling PRN, thoracic ROM, core and scapula stabilization.        Evaluation Complexity:  History (Personal factors, comorbidities) [] 0 [x] 1-2 [] 3+   Exam (limitations, restrictions) [x] 1-2 [] 3 [] 4+   Clinical presentation (progression) [x] Stable [] Evolving  [] Unstable   Decision Making [x] Low [] Moderate [] High    [x] Low Complexity [] Moderate Complexity [] High Complexity Treatment Charges: Mins Units   [x] Evaluation       [x]  Low       []  Moderate       []  High 20 1   []  Modalities     [x]  Ther Exercise 15    [x]  Manual Therapy 10    []  Ther Activities     []  Dry Needling 5    []  Vasocompression     []  Other       TOTAL TREATMENT TIME: 50    Time in: 0710      Time out: 0800    Electronically signed by: Fabiola Amin PT        Physician Signature:________________________________Date:__________________  By signing above or cosigning this note, I have reviewed this plan of care and certify a need for medically necessary rehabilitation services.      *PLEASE SIGN ABOVE AND FAX BACK ALL PAGES*

## 2022-11-22 ENCOUNTER — HOSPITAL ENCOUNTER (OUTPATIENT)
Dept: PHYSICAL THERAPY | Facility: CLINIC | Age: 62
Setting detail: THERAPIES SERIES
Discharge: HOME | End: 2022-11-22
Payer: COMMERCIAL

## 2022-11-22 PROCEDURE — 97140 MANUAL THERAPY 1/> REGIONS: CPT

## 2022-11-22 PROCEDURE — 97110 THERAPEUTIC EXERCISES: CPT

## 2022-11-22 NOTE — FLOWSHEET NOTE
[] [facility])  CENTER &  Therapy  [address]  [phone]  F: [phone] [] [address]  [facility] [address] [address]   [address]  P: [phone]  F: [phone] [] [place]  Therapy  [address]  P: [phone]  F: [phone] [x] [address]  P: [phone]  F: [phone] [] [address]  [facility]   [address]   [place]: [phone]  F: [phone]      Physical Therapy Daily Treatment Note    Date:  2022  Patient Name:  Larry Chaparro    :  1960  MRN: [id number]  Physician: Whit Powell MD                                    Insurance: [facility] ($0 co-pay, $1000 Deductible)  Medical Diagnosis:       M54.9 (ICD-10-CM) - Mid back pain on right side   Rehab Codes: Mid back pain M54.9  Onset Date: 2022                      Next 's appt.: 22       Visit# / total visits: 2/10  Cancels/No Shows: 0    Subjective:    Pain:  [x] Yes  [] No Location: low back Pain Rating: (0-10 scale) 1-2/10  Pain altered Tx:  [] No  [] Yes  Action:  Comments: The patient reports that he felt better after last session. He tried swimming and had no mid-back pain but did have low back pain.     Objective:  Modalities:   Precautions: none  Exercises:  Exercise Reps/ Time Weight/ Level Comments   LTR 2x10       Cat cow  10       Open book     pt gets nausea   Roller self mob         PPT 3x10       PPT with march 3x10     PPT with S/L bicycle 2x10     PPT with SLR 8  Unable to maintain position   Bridge 2x10     S/L Bridge 10     Supine hip flexor stretch 2x30\"     Bird dog 10     Ball swim 2x10     Ball W 2x10     Ball T 2x10           Other:  Manual:   Prone MFR to right erector spinae  Grade V thoracic flexion manipulation T6-10       Access Code: [email or web address]  URL: [email or web address]  Date: 11/22/2022  Prepared by: Felicia Menendez    Exercises  Supine Posterior Pelvic Tilt - 1 x daily - 7 x weekly - 2 sets - 10 reps  Supine March - 1 x daily - 7 x weekly - 2 sets - 10 reps  Supine Transversus Abdominis Bracing with Leg Extension - 1 x daily - 7 x weekly - 2 sets - 10 reps  Ball A - 1 x daily - 7 x weekly - 2 sets - 10 reps  Ball W - 1 x daily - 7 x weekly - 2 sets - 10 reps  Supine 90/90 Alternating Heel Touches with Posterior Pelvic Tilt - 1 x daily - 7 x weekly - 2 sets - 10 reps  Bird Dog on The Yoshi-Yasir - 1 x daily - 7 x weekly - 2 sets - 10 reps        Treatment Charges: Mins Units   []  Modalities     [x]  Ther Exercise 35 3   [x]  Manual Therapy 20 1   []  Ther Activities     []  Aquatics     []  Vasocompression     []  Other     Total Treatment time 55 4       Assessment: [x] Progressing toward goals. Patient had relief following the first visit. He has core weakness most evident with left leg open chain exercise. Gluteal weakness noted with bridge having reduced ROM and HS cramping. He was given core stability progression    [] No change. [] Other:  [x] Patient would continue to benefit from skilled physical therapy services in order to: The patient is a 63year old with a chief complaint of right sided thoracic pain and signs and symptoms consistent with a diagnosis of right thoracic pain. He presents with pain, no weakness, normal range of motion, postural deficits, hypertonicity and functional limitations. He will benefit from skilled PT to address above deficits. STG/LTG  STG: (to be met in 5 treatments)  ? Pain: 1/10  Patient to be independent with home exercise program as demonstrated by performance with correct form without cues.      LTG: (to be met in 10 treatments)  Patient will resume swimming pain free  Patient will sit greater than one hour without increase in back pain  Patient will improve Oswestry by >10%    Pt. Education:  [x] Yes  [] No  [x] HEP/Ed  Method of Education: [x] Verbal  [x] Demo  [x] Written  Comprehension of Education:  [x] Verbalizes understanding. [x] Demonstrates understanding. [] Needs review. [] Demonstrates/verbalizes HEP/Ed previously given. Plan: [x] Continue current frequency toward long and short term goals.     [x] Specific Instructions for subsequent treatments: MT PRN, Progress core stability      Time In:0700            Time Out: 6386    Electronically signed by:  Felicia Menendez, PT

## 2022-11-23 ENCOUNTER — TELEPHONE (OUTPATIENT)
Dept: FAMILY MEDICINE CLINIC | Age: 62
End: 2022-11-23

## 2022-11-27 DIAGNOSIS — J45.51 ASTHMA, SEVERE PERSISTENT, POORLY-CONTROLLED, WITH ACUTE EXACERBATION: ICD-10-CM

## 2022-11-29 ENCOUNTER — NURSE ONLY (OUTPATIENT)
Dept: FAMILY MEDICINE CLINIC | Age: 62
End: 2022-11-29
Payer: COMMERCIAL

## 2022-11-29 ENCOUNTER — HOSPITAL ENCOUNTER (OUTPATIENT)
Dept: PHYSICAL THERAPY | Facility: CLINIC | Age: 62
Setting detail: THERAPIES SERIES
Discharge: HOME | End: 2022-11-29
Payer: COMMERCIAL

## 2022-11-29 VITALS
DIASTOLIC BLOOD PRESSURE: 82 MMHG | SYSTOLIC BLOOD PRESSURE: 124 MMHG | OXYGEN SATURATION: 97 % | BODY MASS INDEX: 24.85 KG/M2 | HEART RATE: 53 BPM | WEIGHT: 178.2 LBS

## 2022-11-29 DIAGNOSIS — Z23 FLU VACCINE NEED: Primary | ICD-10-CM

## 2022-11-29 PROCEDURE — 90471 IMMUNIZATION ADMIN: CPT | Performed by: FAMILY MEDICINE

## 2022-11-29 PROCEDURE — 97110 THERAPEUTIC EXERCISES: CPT

## 2022-11-29 PROCEDURE — 99211 OFF/OP EST MAY X REQ PHY/QHP: CPT | Performed by: FAMILY MEDICINE

## 2022-11-29 PROCEDURE — 90674 CCIIV4 VAC NO PRSV 0.5 ML IM: CPT | Performed by: FAMILY MEDICINE

## 2022-11-29 PROCEDURE — 97140 MANUAL THERAPY 1/> REGIONS: CPT

## 2022-11-29 NOTE — FLOWSHEET NOTE
Other: Manual:   Prone MFR to right erector spinae  Grade V thoracic flexion manipulation T6-10       Access Code: [email or web address]  URL: [place]/  Date: 11/22/2022  Prepared by: Homar Eis    Exercises  Supine Posterior Pelvic Tilt - 1 x daily - 7 x weekly - 2 sets - 10 reps  Supine March - 1 x daily - 7 x weekly - 2 sets - 10 reps  Supine Transversus Abdominis Bracing with Leg Extension - 1 x daily - 7 x weekly - 2 sets - 10 reps  Ball A - 1 x daily - 7 x weekly - 2 sets - 10 reps  Ball W - 1 x daily - 7 x weekly - 2 sets - 10 reps  Supine 90/90 Alternating Heel Touches with Posterior Pelvic Tilt - 1 x daily - 7 x weekly - 2 sets - 10 reps  Bird Dog on The Yoshi-Yasir - 1 x daily - 7 x weekly - 2 sets - 10 reps  Access Code: [facility]  URL: [employer]/  Date: 11/29/2022  Prepared by: Homar Eis    Exercises  Supine Transversus Abdominis Bracing with Leg Extension - 1 x daily - 3 x weekly - 3 sets - 10 reps  Supine Pelvic Tilt with Straight Leg Raise - 1 x daily - 3 x weekly - 3 sets - 10 reps  Single Leg Bridge - 1 x daily - 3 x weekly - 3 sets - 10 reps  Lateral Step Down - 1 x daily - 3 x weekly - 3 sets - 10 reps  Single leg RDL - 1 x daily - 3 x weekly - 3 sets - 10 reps        Treatment Charges: Mins Units   []  Modalities     [x]  Ther Exercise 40 3   [x]  Manual Therapy 15 1   []  Ther Activities     []  Aquatics     []  Vasocompression     []  Other     Total Treatment time 55 4       Assessment: [x] Progressing toward goals. Patient demonstrates improved stability with core progression but continues to have difficulty stabilizing with left leg open chain exercises. Progressed to S/L stability with more difficulty noted while standing on the left. HEP was updated, will continue 1 time per week. [] No change. [] Other:  [x] Patient would continue to benefit from skilled physical therapy services in order to:  The patient is a 63year old with a chief complaint of right sided thoracic pain and signs and symptoms consistent with a diagnosis of right thoracic pain. He presents with pain, no weakness, normal range of motion, postural deficits, hypertonicity and functional limitations. He will benefit from skilled PT to address above deficits. STG/LTG  STG: (to be met in 5 treatments)  ? Pain: 1/10  Patient to be independent with home exercise program as demonstrated by performance with correct form without cues. LTG: (to be met in 10 treatments)  Patient will resume swimming pain free  Patient will sit greater than one hour without increase in back pain  Patient will improve Oswestry by >10%    Pt. Education:  [x] Yes  [] No  [x] HEP/Ed  Method of Education: [x] Verbal  [x] Demo  [x] Written  Comprehension of Education:  [x] Verbalizes understanding. [x] Demonstrates understanding. [] Needs review. [] Demonstrates/verbalizes HEP/Ed previously given. Plan: [x] Continue current frequency toward long and short term goals.     [x] Specific Instructions for subsequent treatments: MT PRN, Progress core stability      Time In:0700            Time Out: 3699    Electronically signed by:  Terrie Mortimer, PT

## 2022-12-05 DIAGNOSIS — J45.51 ASTHMA, SEVERE PERSISTENT, POORLY-CONTROLLED, WITH ACUTE EXACERBATION: ICD-10-CM

## 2022-12-06 ENCOUNTER — HOSPITAL ENCOUNTER (OUTPATIENT)
Dept: PHYSICAL THERAPY | Facility: CLINIC | Age: 62
Setting detail: THERAPIES SERIES
Discharge: HOME | End: 2022-12-06
Payer: COMMERCIAL

## 2022-12-06 PROCEDURE — 97110 THERAPEUTIC EXERCISES: CPT

## 2022-12-06 PROCEDURE — 97140 MANUAL THERAPY 1/> REGIONS: CPT

## 2022-12-06 NOTE — FLOWSHEET NOTE
[] [facility]) [facility]  [facility]  [address]  [phone]  F: [phone] [] [address]  [place] [address]   [address]  P: [phone]  F: [phone] [] [address]  Therapy  [address]  P: [phone]  F: [phone] [x] [address]  P: [phone]  F: [phone] [] [address]  [facility]   [address]   [place]: [phone]  F: [phone]      Physical Therapy Daily Treatment Note    Date:  2022  Patient Name:  Obdulio Foster    :  1960  MRN: [id number]  Physician: Jamison Rogers MD                                    Insurance: [facility] ($0 co-pay, $1000 Deductible)  Medical Diagnosis:       M54.9 (ICD-10-CM) - Mid back pain on right side   Rehab Codes: Mid back pain M54.9  Onset Date: 2022                      Next 's appt.: 22       Visit# / total visits: 4/10  Cancels/No Shows: 0    Subjective:    Pain:  [x] Yes  [] No Location: low back Pain Rating: (0-10 scale) 1-2/10  Pain altered Tx:  [] No  [] Yes  Action:  Comments: The patient reports that he has been swimming with only mild pain in his low back. He has some remaining right side rib pain.     Objective:  Modalities:   Precautions: none  Exercises:  Exercise Reps/ Time Weight/ Level Comments   LTR 2x10       Cat cow  10       Open book     pt gets nausea   Roller self mob         PPT 2x15       PPT with march      PPT with S/L bicycle 2x15     PPT with SLR 2x10  Unable to maintain position   Bug 2x5     Bridge 2x10     S/L Bridge March 10     1/2 side plank with abduction 10ea     Kneeling hip flexor stretch 2x30\"     Bird dog      Ball swim 2x10     Ball W 2x10     Ball T 2x10     Squat 12     step dip 3x10 8\"    S/L RDL 2x10 ea Other: Manual:   Prone MFR to right erector spinae    Dry Needling performed in conjunction with Manual therapy to promote tissue extensibility, improve ROM, and reduce pain. No charge submitted for the time the needle was inserted. 3 right erector spinae trigger points treated with a 25 mm needle using a bony backdrop for safety. Multiple twitch responses produced. Access Code: [email or web address]  URL: [email or web address]/  Date: 11/22/2022  Prepared by: Homar Eis    Exercises  Supine Posterior Pelvic Tilt - 1 x daily - 7 x weekly - 2 sets - 10 reps  Supine March - 1 x daily - 7 x weekly - 2 sets - 10 reps  Supine Transversus Abdominis Bracing with Leg Extension - 1 x daily - 7 x weekly - 2 sets - 10 reps  Ball A - 1 x daily - 7 x weekly - 2 sets - 10 reps  Ball W - 1 x daily - 7 x weekly - 2 sets - 10 reps  Supine 90/90 Alternating Heel Touches with Posterior Pelvic Tilt - 1 x daily - 7 x weekly - 2 sets - 10 reps  Bird Dog on The Yoshi-Yasir - 1 x daily - 7 x weekly - 2 sets - 10 reps  Access Code: [facility]  URL: [email or web address]/  Date: 11/29/2022  Prepared by: Homar Simon    Exercises  Supine Transversus Abdominis Bracing with Leg Extension - 1 x daily - 3 x weekly - 3 sets - 10 reps  Supine Pelvic Tilt with Straight Leg Raise - 1 x daily - 3 x weekly - 3 sets - 10 reps  Single Leg Bridge - 1 x daily - 3 x weekly - 3 sets - 10 reps  Lateral Step Down - 1 x daily - 3 x weekly - 3 sets - 10 reps  Single leg RDL - 1 x daily - 3 x weekly - 3 sets - 10 reps  Access Code: [email or web address]  URL: [email or web address]/  Date: 12/06/2022  Prepared by: Homar Simon    Exercises  Supine Dead Bug with Leg Extension - 1 x daily - 7 x weekly - 3 sets - 10 reps        Treatment Charges: Mins Units   []  Modalities     [x]  Ther Exercise 40 3   [x]  Manual Therapy 15 1   []  Ther Activities     []  Aquatics     []  Vasocompression     [x]  Other: Dry Needling 5    Total Treatment time 31 7 Assessment: [x] Progressing toward goals. Patient demonstrates improved stability with core progression. He was able to progress to dying but without pain but notable difficulty. He had no pain with exercises. HEP was updated, he will continue with HEP and return in 2 weeks. [] No change. [] Other:  [x] Patient would continue to benefit from skilled physical therapy services in order to: The patient is a 63year old with a chief complaint of right sided thoracic pain and signs and symptoms consistent with a diagnosis of right thoracic pain. He presents with pain, no weakness, normal range of motion, postural deficits, hypertonicity and functional limitations. He will benefit from skilled PT to address above deficits. STG/LTG  STG: (to be met in 5 treatments)  ? Pain: 1/10  Patient to be independent with home exercise program as demonstrated by performance with correct form without cues. LTG: (to be met in 10 treatments)  Patient will resume swimming pain free  Patient will sit greater than one hour without increase in back pain  Patient will improve Oswestry by >10%    Pt. Education:  [x] Yes  [] No  [x] HEP/Ed  Method of Education: [x] Verbal  [x] Demo  [x] Written  Comprehension of Education:  [x] Verbalizes understanding. [x] Demonstrates understanding. [] Needs review. [] Demonstrates/verbalizes HEP/Ed previously given. Plan: [x] Continue current frequency toward long and short term goals.     [x] Specific Instructions for subsequent treatments: MT PRN, Progress core stability      Time WD:6990            Time Out: 1277    Electronically signed by:  Markell Cornelius PT

## 2022-12-19 ENCOUNTER — OFFICE VISIT (OUTPATIENT)
Dept: PAIN MANAGEMENT | Age: 62
End: 2022-12-19
Payer: COMMERCIAL

## 2022-12-19 VITALS
WEIGHT: 178 LBS | BODY MASS INDEX: 24.92 KG/M2 | HEART RATE: 50 BPM | OXYGEN SATURATION: 99 % | SYSTOLIC BLOOD PRESSURE: 151 MMHG | DIASTOLIC BLOOD PRESSURE: 89 MMHG | HEIGHT: 71 IN

## 2022-12-19 DIAGNOSIS — G89.29 CHRONIC MIDLINE LOW BACK PAIN WITHOUT SCIATICA: Primary | ICD-10-CM

## 2022-12-19 DIAGNOSIS — M54.50 CHRONIC MIDLINE LOW BACK PAIN WITHOUT SCIATICA: Primary | ICD-10-CM

## 2022-12-19 DIAGNOSIS — M53.3 CHRONIC SI JOINT PAIN: Chronic | ICD-10-CM

## 2022-12-19 DIAGNOSIS — M51.36 DDD (DEGENERATIVE DISC DISEASE), LUMBAR: Chronic | ICD-10-CM

## 2022-12-19 DIAGNOSIS — G89.29 CHRONIC SI JOINT PAIN: Chronic | ICD-10-CM

## 2022-12-19 PROCEDURE — 99213 OFFICE O/P EST LOW 20 MIN: CPT | Performed by: NURSE PRACTITIONER

## 2022-12-19 RX ORDER — OMEPRAZOLE 20 MG/1
TABLET, DELAYED RELEASE ORAL
COMMUNITY

## 2022-12-19 RX ORDER — FLUTICASONE PROPIONATE AND SALMETEROL 250; 50 UG/1; UG/1
POWDER RESPIRATORY (INHALATION)
COMMUNITY

## 2022-12-19 RX ORDER — BACLOFEN 10 MG/1
10 TABLET ORAL NIGHTLY
Qty: 30 TABLET | Refills: 2 | Status: SHIPPED | OUTPATIENT
Start: 2022-12-19 | End: 2023-01-18

## 2022-12-19 ASSESSMENT — ENCOUNTER SYMPTOMS
COUGH: 0
DIARRHEA: 0
VOMITING: 0
CONSTIPATION: 0
BACK PAIN: 1
NAUSEA: 0
SHORTNESS OF BREATH: 0

## 2022-12-19 NOTE — PROGRESS NOTES
Chief Complaint   Patient presents with    Back Pain     Follow up to PT          [facility]     82-year-old man with history of right-sided lower back pain. Patient is physically active and is an avid runner  In past he was diagnosed with lumbar degenerative disc disease and vertebrogenic low back pain for which he had basivertebral nerve ablation 10/2021 that provided almost 100% improvement in axial lower back pain for 4 months. This past summer he had flare up of lumbar pain with failed SI joint injection and failed MBB. MRI was updated and showed degenerative changes at S1 level with Successful lesioning of L4 and L5 basivertebral nerve intercept procedure for Modic changes done in past  No significant stenosis  Appt with MD with PT ordered to try DN for suspected muscular pain. Pt has had 4 visits and reports moderate improvement of his thoracic pain  and still going for lumbar pain.      HPI:     Patient is here today for: back pain to follow up on pt   Pain level: 3  Character: aching   Radiating: no  Weakness or numbness: no  Aggravating Factors: non active   Alleviating Factors: aleve moving   Constant or intermitting:  constant   Bladder/bowel loss: no             Past Medical History:   Diagnosis Date    Arthritis     in hip    Asthma     GERD (gastroesophageal reflux disease)        Past Surgical History:   Procedure Laterality Date    COLONOSCOPY  04/06/2018    polyp removed    COLONOSCOPY N/A 4/6/2018    COLONOSCOPY POLYPECTOMY SNARE/COLD BIOPSY performed by Carlotta Tirado MD at [address] Right 11/16/2020    RIGHT L4 AND L5 EPIDURAL STEROID INJECTION performed by Bereket Padron MD at [address] N/A 10/28/2021    BASIVERTEBRAL NERVE NERVE RADIOFREQUENCY ABLATION INTRACEPT PROCEDURE at L4 / L5 / S1 performed by Bereket Padron MD at [address]         Allergies   Allergen Reactions    Ciprofloxacin Dermatitis Current Outpatient Medications:     fluticasone-salmeterol (ADVAIR) 250-50 MCG/ACT AEPB diskus inhaler, 1, BID for 90, Disp: , Rfl:     omeprazole (PRILOSEC OTC) 20 MG tablet, TAKE 1 CAPSULE BY MOUTH TWICE DAILY. , Disp: , Rfl:     fluticasone (FLONASE) 50 MCG/ACT nasal spray, 1 spray by Each Nostril route daily, Disp: 32 g, Rfl: 3    mometasone-formoterol (DULERA) 200-5 MCG/ACT inhaler, Inhale 2 puffs into the lungs 2 times daily, Disp: 90 each, Rfl: 3    ramipril (ALTACE) 1.25 MG capsule, TAKE 1 CAPSULE BY MOUTH ONE TIME A DAY, Disp: 90 capsule, Rfl: 1    tadalafil (CIALIS) 5 MG tablet, Take 1 tablet by mouth daily, Disp: 90 tablet, Rfl: 1    albuterol sulfate HFA (PROAIR HFA) 108 (90 Base) MCG/ACT inhaler, Inhale 2 puffs into the lungs every 6 hours as needed for Wheezing or Shortness of Breath, Disp: 3 each, Rfl: 0    SUMAtriptan (IMITREX) 100 MG tablet, TAKE ONE TABLET BY MOUTH ONCE AS NEEDED FOR MIGRAINE, Disp: 9 tablet, Rfl: 0    omeprazole (PRILOSEC) 20 MG delayed release capsule, TAKE 1 CAPSULE BY MOUTH 2 TIMES A DAY, Disp: 180 capsule, Rfl: 1    montelukast (SINGULAIR) 10 MG tablet, Take 1 tablet by mouth nightly, Disp: 90 tablet, Rfl: 3    methocarbamol (ROBAXIN) 500 MG tablet, , Disp: , Rfl:     Blood Pressure KIT, 1 each by Does not apply route daily, Disp: 1 kit, Rfl: 0    Family History   Problem Relation Age of Onset    No Known Problems Mother     Diabetes Father        Social History     Socioeconomic History    Marital status:      Spouse name: Not on file    Number of children: Not on file    Years of education: Not on file    Highest education level: Not on file   Occupational History    Not on file   Tobacco Use    Smoking status: Former     Packs/day: 0.10     Years: 10.00     Pack years: 1.00     Types: Cigars, Cigarettes     Quit date:      Years since quittin.9    Smokeless tobacco: Never    Tobacco comments:     patient states 4 cigars a year    Vaping Use    Vaping Use: Never used   Substance and Sexual Activity    Alcohol use: Yes     Comment: socially    Drug use: No    Sexual activity: Yes   Other Topics Concern    Not on file   Social History Narrative    Not on file     Social Determinants of Health     Financial Resource Strain: Not on file   Food Insecurity: Not on file   Transportation Needs: Not on file   Physical Activity: Not on file   Stress: Not on file   Social Connections: Not on file   Intimate Partner Violence: Not on file   Housing Stability: Not on file       Review of Systems:  Review of Systems   Constitutional: Negative for chills, fever and malaise/fatigue. Cardiovascular:  Negative for chest pain. Respiratory:  Negative for cough and shortness of breath. Musculoskeletal:  Positive for back pain. Negative for falls, muscle cramps, muscle weakness and stiffness. Gastrointestinal:  Negative for constipation, diarrhea, nausea and vomiting. Neurological:  Negative for dizziness, headaches and numbness. Physical Exam:  BP (!) 151/89   Pulse 50   Ht 5' 11\" (1.803 m)   Wt 178 lb (80.7 kg)   SpO2 99%   BMI 24.83 kg/m²     Physical Exam  Cardiovascular:      Rate and Rhythm: Normal rate. Pulmonary:      Effort: Pulmonary effort is normal.   Musculoskeletal:         General: Normal range of motion. Skin:     General: Skin is warm and dry. Neurological:      Mental Status: He is alert and oriented to person, place, and time.            Assessment:  Problem List Items Addressed This Visit       Chronic SI joint pain (Chronic)    DDD (degenerative disc disease), lumbar (Chronic)    Chronic midline low back pain without sciatica - Primary          Treatment Plan:    Patient to continue PT  Script written for baclofen  Pt would like to call if pain worsens      I have reviewed the chief complaint and history of present illness (including ROS and PFSH) and vital documentation by my staff and I agree with their documentation and have added where applicable.

## 2022-12-20 ENCOUNTER — HOSPITAL ENCOUNTER (OUTPATIENT)
Dept: PHYSICAL THERAPY | Facility: CLINIC | Age: 62
Setting detail: THERAPIES SERIES
Discharge: HOME | End: 2022-12-20
Payer: COMMERCIAL

## 2022-12-20 PROCEDURE — 97110 THERAPEUTIC EXERCISES: CPT

## 2022-12-20 PROCEDURE — 97140 MANUAL THERAPY 1/> REGIONS: CPT

## 2022-12-20 NOTE — TELEPHONE ENCOUNTER
LAST VISIT: 6/24/22  NEXT VISIT: 6/30/23    Per last dictation patient is on this medication. Please sign for refill if ok. Thank you.

## 2022-12-20 NOTE — FLOWSHEET NOTE
[] [facility]) [facility] &  Therapy  [address]  [phone]  F: [phone] [] [address]  [address]   [address]  P: [phone]  F: [phone] [] [place]  [facility]  [address]  P: [phone]  F: [phone] [x] [address]  P: [phone]  F: [phone] [] [address]  [facility]   [address]   [place]: [phone]  F: [phone]      Physical Therapy Daily Treatment Note    Date:  2022  Patient Name:  Misty Navarro    :  1960  MRN: [id number]  Physician: Bob Guo MD                                    Insurance: [facility] ($0 co-pay, $1000 Deductible)  Medical Diagnosis:       M54.9 (ICD-10-CM) - Mid back pain on right side   Rehab Codes: Mid back pain M54.9  Onset Date: 2022                      Next 's appt.: 22       Visit# / total visits: 5/10  Cancels/No Shows: 0    Subjective:    Pain:  [x] Yes  [] No Location: low back Pain Rating: (0-10 scale) 1-2/10  Pain altered Tx:  [] No  [] Yes  Action:  Comments: The patient reports that he has been swimming with only mild pain in his low back. He has some remaining right side rib pain.     Objective:  Modalities:   Precautions: none  Exercises:  Exercise Reps/ Time Weight/ Level Comments   LTR 2x10       Cat cow  10       Open book     pt gets nausea   Roller self mob         PPT 2x15       PPT with march 15     PPT with S/L bicycle 2x15     PPT with SLR 2x10  Unable to maintain position   Bug 2x5     Bridge x10     S/L Bridge  10     1/2 side plank with abduction 10ea     Kneeling hip flexor stretch 2x30\"     Bird dog      Ball swim 2x10     Ball W 2x10     Ball T 2x10     Squat      step dip 3x10 8\"    S/L RDL 2x10 ea Other: Manual:   Prone MFR to right erector spinae    (Held)  Dry Needling performed in conjunction with Manual therapy to promote tissue extensibility, improve ROM, and reduce pain. No charge submitted for the time the needle was inserted. 3 right erector spinae trigger points treated with a 25 mm needle using a bony backdrop for safety. Multiple twitch responses produced. Access Code: [email or web address]  URL: [place]/  Date: 11/22/2022  Prepared by: Mammie Starch    Exercises  Supine Posterior Pelvic Tilt - 1 x daily - 7 x weekly - 2 sets - 10 reps  Supine March - 1 x daily - 7 x weekly - 2 sets - 10 reps  Supine Transversus Abdominis Bracing with Leg Extension - 1 x daily - 7 x weekly - 2 sets - 10 reps  Ball A - 1 x daily - 7 x weekly - 2 sets - 10 reps  Ball W - 1 x daily - 7 x weekly - 2 sets - 10 reps  Supine 90/90 Alternating Heel Touches with Posterior Pelvic Tilt - 1 x daily - 7 x weekly - 2 sets - 10 reps  Bird Dog on The [place]asir - 1 x daily - 7 x weekly - 2 sets - 10 reps  Access Code: [facility]  URL: [employer]/  Date: 11/29/2022  Prepared by: Mammie Starch    Exercises  Supine Transversus Abdominis Bracing with Leg Extension - 1 x daily - 3 x weekly - 3 sets - 10 reps  Supine Pelvic Tilt with Straight Leg Raise - 1 x daily - 3 x weekly - 3 sets - 10 reps  Single Leg Bridge - 1 x daily - 3 x weekly - 3 sets - 10 reps  Lateral Step Down - 1 x daily - 3 x weekly - 3 sets - 10 reps  Single leg RDL - 1 x daily - 3 x weekly - 3 sets - 10 reps  Access Code: [email or web address]  URL: [employer]/  Date: 12/06/2022  Prepared by: Mammie Starch    Exercises  Supine Dead Bug with Leg Extension - 1 x daily - 7 x weekly - 3 sets - 10 reps        Treatment Charges: Mins Units   []  Modalities     [x]  Ther Exercise 40 3   [x]  Manual Therapy 15 1   []  Ther Activities     []  Aquatics     []  Vasocompression     []  Other: Dry Needling     Total Treatment time 55 4 Assessment: [x] Progressing toward goals. Patient has improved back pan and function. He has resumed full training with some continued LBP while swimming but no thoracic pain and no pain while running. He is independent in core and hip strengthening and functional strengthening. He will continue to progress his training for a 1/2 Iron man distance triathlon. If he has any setbacks in the next 4 weeks he will return, if not he will D/C to HEP. [] No change. [] Other:  [x] Patient would continue to benefit from skilled physical therapy services in order to: The patient is a 63year old with a chief complaint of right sided thoracic pain and signs and symptoms consistent with a diagnosis of right thoracic pain. He presents with pain, no weakness, normal range of motion, postural deficits, hypertonicity and functional limitations. He will benefit from skilled PT to address above deficits. STG/LTG  STG: (to be met in 5 treatments)  ? Pain: 1/10. Met  Patient to be independent with home exercise program as demonstrated by performance with correct form without cues. Met     LTG: (to be met in 10 treatments)  Patient will resume swimming pain free. Not Met  Patient will sit greater than one hour without increase in back pain Met  Patient will improve Oswestry by >10%. Not Assessed    Pt. Education:  [x] Yes  [] No  [x] HEP/Ed  Method of Education: [x] Verbal  [x] Demo  [x] Written  Comprehension of Education:  [x] Verbalizes understanding. [x] Demonstrates understanding. [] Needs review. [] Demonstrates/verbalizes HEP/Ed previously given. Plan: [x] Continue current frequency toward long and short term goals.     [x] Specific Instructions for subsequent treatments: MT PRN, Progress core stability      Time GI:6644            Time Out: 7888    Electronically signed by:  Fozia Concepcion PT

## 2022-12-21 RX ORDER — ALBUTEROL SULFATE 90 UG/1
2 AEROSOL, METERED RESPIRATORY (INHALATION) EVERY 6 HOURS PRN
Qty: 3 EACH | Refills: 1 | Status: SHIPPED | OUTPATIENT
Start: 2022-12-21

## 2022-12-30 NOTE — TELEPHONE ENCOUNTER
Krish Tipton is calling to request a refill on the following medication(s):    Last Visit Date (If Applicable):  02/2/2590    Next Visit Date:    Visit date not found    Medication Request:  Requested Prescriptions     Pending Prescriptions Disp Refills    ramipril (ALTACE) 1.25 MG capsule 90 capsule 1     Sig: TAKE 1 CAPSULE BY MOUTH ONE TIME A DAY

## 2022-12-31 RX ORDER — RAMIPRIL 1.25 MG/1
CAPSULE ORAL
Qty: 90 CAPSULE | Refills: 1 | Status: SHIPPED | OUTPATIENT
Start: 2022-12-31
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
## 2023-02-17 RX ORDER — OMEPRAZOLE 20 MG/1
CAPSULE, DELAYED RELEASE ORAL
Qty: 180 CAPSULE | Refills: 1 | Status: SHIPPED | OUTPATIENT
Start: 2023-02-17

## 2023-03-21 DIAGNOSIS — J45.51 ASTHMA, SEVERE PERSISTENT, POORLY-CONTROLLED, WITH ACUTE EXACERBATION: ICD-10-CM

## 2023-03-21 NOTE — TELEPHONE ENCOUNTER
LAST VISIT: 6/24/22  NEXT VISIT: 6/30/23    No mention of this medication in your last dictation but you have prescribed in the past. Please sign for refill if ok. Thank you.

## 2023-03-22 RX ORDER — MONTELUKAST SODIUM 10 MG/1
10 TABLET ORAL NIGHTLY
Qty: 90 TABLET | Refills: 1 | Status: SHIPPED | OUTPATIENT
Start: 2023-03-22

## 2023-04-06 RX ORDER — PREDNISONE 20 MG/1
20 TABLET ORAL DAILY
Qty: 5 TABLET | Refills: 1 | Status: SHIPPED | OUTPATIENT
Start: 2023-04-06 | End: 2023-04-11

## 2023-04-17 DIAGNOSIS — N52.01 ERECTILE DYSFUNCTION DUE TO ARTERIAL INSUFFICIENCY: Primary | ICD-10-CM

## 2023-04-18 ENCOUNTER — TELEPHONE (OUTPATIENT)
Dept: UROLOGY | Age: 63
End: 2023-04-18

## 2023-04-18 NOTE — TELEPHONE ENCOUNTER
Returned pts voicemail, stated in voicemail that a refill request has been sent to Dr. Ethan De La Cruz, he will be in on Monday 4.24.23 & will be able to sign the refill request then.

## 2023-04-19 RX ORDER — TADALAFIL 5 MG/1
5 TABLET ORAL DAILY
Qty: 90 TABLET | Refills: 1 | Status: SHIPPED | OUTPATIENT
Start: 2023-04-19 | End: 2023-04-20 | Stop reason: CLARIF

## 2023-04-20 ENCOUNTER — TELEPHONE (OUTPATIENT)
Dept: UROLOGY | Age: 63
End: 2023-04-20

## 2023-04-20 NOTE — TELEPHONE ENCOUNTER
Medication was sent to wrong pharmacy.  This was corrected and phoned into [facility]

## 2023-05-16 LAB
CHOLEST SERPL-MCNC: 205 MG/DL
CHOLESTEROL/HDL RATIO: 4.1
GLUCOSE SERPL-MCNC: 98 MG/DL (ref 70–99)
HDLC SERPL-MCNC: 50 MG/DL
LDLC SERPL CALC-MCNC: 140 MG/DL (ref 0–130)
PATIENT FASTING?: YES
TRIGL SERPL-MCNC: 73 MG/DL

## 2023-06-30 ENCOUNTER — OFFICE VISIT (OUTPATIENT)
Dept: PULMONOLOGY | Age: 63
End: 2023-06-30
Payer: COMMERCIAL

## 2023-06-30 VITALS
WEIGHT: 174 LBS | SYSTOLIC BLOOD PRESSURE: 108 MMHG | HEART RATE: 66 BPM | RESPIRATION RATE: 16 BRPM | DIASTOLIC BLOOD PRESSURE: 62 MMHG | OXYGEN SATURATION: 94 % | HEIGHT: 71 IN | BODY MASS INDEX: 24.36 KG/M2 | TEMPERATURE: 98.2 F

## 2023-06-30 DIAGNOSIS — J45.51 ASTHMA, SEVERE PERSISTENT, POORLY-CONTROLLED, WITH ACUTE EXACERBATION: ICD-10-CM

## 2023-06-30 PROCEDURE — 99213 OFFICE O/P EST LOW 20 MIN: CPT | Performed by: INTERNAL MEDICINE

## 2023-06-30 RX ORDER — ALBUTEROL SULFATE 90 UG/1
2 AEROSOL, METERED RESPIRATORY (INHALATION) EVERY 6 HOURS PRN
Qty: 3 EACH | Refills: 3 | Status: SHIPPED | OUTPATIENT
Start: 2023-06-30

## 2023-06-30 ASSESSMENT — ENCOUNTER SYMPTOMS
SHORTNESS OF BREATH: 1
EYES NEGATIVE: 1

## 2023-08-09 ENCOUNTER — TELEPHONE (OUTPATIENT)
Dept: FAMILY MEDICINE CLINIC | Age: 63
End: 2023-08-09

## 2023-08-09 NOTE — TELEPHONE ENCOUNTER
[facility] would like to know if provider would like to change dosing instructions on omeprazole 20 mg. Pharmacy states that medication has not been filled since 2/2023 and is refill is requiring a prior authorization.

## 2023-08-15 NOTE — TELEPHONE ENCOUNTER
Determination was received on 8/15/23. Omeprazole 20 mg tablets ae not covered by the health plan due to over-the-counter availability.     Denial scanned into the patient's chart 8/15/23

## 2023-08-16 ENCOUNTER — OFFICE VISIT (OUTPATIENT)
Dept: NEUROSURGERY | Age: 63
End: 2023-08-16
Payer: COMMERCIAL

## 2023-08-16 VITALS
TEMPERATURE: 97.6 F | HEART RATE: 50 BPM | DIASTOLIC BLOOD PRESSURE: 79 MMHG | OXYGEN SATURATION: 96 % | WEIGHT: 174 LBS | BODY MASS INDEX: 24.36 KG/M2 | SYSTOLIC BLOOD PRESSURE: 124 MMHG | HEIGHT: 71 IN

## 2023-08-16 DIAGNOSIS — M47.816 LUMBAR SPONDYLOSIS: Primary | ICD-10-CM

## 2023-08-16 PROCEDURE — 99213 OFFICE O/P EST LOW 20 MIN: CPT | Performed by: NURSE PRACTITIONER

## 2023-08-16 NOTE — PROGRESS NOTES
well.  Pain currently well controlled with no sensorimotor deficits present on exam.  Recommend continued monitoring, remain physically active. Follow-up with pain management as needed. Can reach out to office for prednisone taper if flareup occurs again. Otherwise can follow-up as needed. Followup: Return if symptoms worsen or fail to improve. Prescriptions Ordered:  No orders of the defined types were placed in this encounter. Orders Placed:  No orders of the defined types were placed in this encounter. Electronically signed by SLIME Barahona CNP on 8/16/2023 at 9:29 AM    Please note that this chart was generated using voice recognition Dragon dictation software. Although every effort was made to ensure the accuracy of this automated transcription, some errors in transcription may have occurred.

## 2023-10-09 DIAGNOSIS — J45.51 ASTHMA, SEVERE PERSISTENT, POORLY-CONTROLLED, WITH ACUTE EXACERBATION: ICD-10-CM

## 2023-10-09 RX ORDER — MONTELUKAST SODIUM 10 MG/1
10 TABLET ORAL NIGHTLY
Qty: 90 TABLET | Refills: 2 | Status: SHIPPED | OUTPATIENT
Start: 2023-10-09

## 2023-10-09 NOTE — TELEPHONE ENCOUNTER
LAST VISIT: 6/30/23  NEXT VISIT: 6/28/24    Per last dictation patient is on this medication. Please sign for refill if ok. Thank you.

## 2023-11-02 DIAGNOSIS — Z12.5 PROSTATE CANCER SCREENING: Primary | ICD-10-CM

## 2023-11-02 DIAGNOSIS — Z12.5 SCREENING FOR PROSTATE CANCER: ICD-10-CM

## 2023-11-03 ENCOUNTER — HOSPITAL ENCOUNTER (OUTPATIENT)
Age: 63
Setting detail: SPECIMEN
Discharge: HOME | End: 2023-11-03

## 2023-11-03 DIAGNOSIS — Z12.5 PROSTATE CANCER SCREENING: ICD-10-CM

## 2023-11-03 LAB — PSA SERPL-MCNC: 1.37 NG/ML

## 2023-11-06 ENCOUNTER — OFFICE VISIT (OUTPATIENT)
Dept: UROLOGY | Age: 63
End: 2023-11-06
Payer: COMMERCIAL

## 2023-11-06 VITALS
BODY MASS INDEX: 24.5 KG/M2 | DIASTOLIC BLOOD PRESSURE: 75 MMHG | HEIGHT: 71 IN | SYSTOLIC BLOOD PRESSURE: 136 MMHG | TEMPERATURE: 97.9 F | WEIGHT: 175 LBS

## 2023-11-06 DIAGNOSIS — R35.1 NOCTURIA: ICD-10-CM

## 2023-11-06 DIAGNOSIS — N52.01 ERECTILE DYSFUNCTION DUE TO ARTERIAL INSUFFICIENCY: Primary | ICD-10-CM

## 2023-11-06 DIAGNOSIS — Z12.5 SCREENING FOR PROSTATE CANCER: ICD-10-CM

## 2023-11-06 PROCEDURE — 99213 OFFICE O/P EST LOW 20 MIN: CPT | Performed by: UROLOGY

## 2023-11-06 RX ORDER — TADALAFIL 5 MG/1
5 TABLET ORAL DAILY
Qty: 90 TABLET | Refills: 3 | Status: SHIPPED | OUTPATIENT
Start: 2023-11-06

## 2023-11-06 ASSESSMENT — ENCOUNTER SYMPTOMS
WHEEZING: 0
VOMITING: 0
EYE REDNESS: 0
COLOR CHANGE: 0
SHORTNESS OF BREATH: 0
NAUSEA: 0
BACK PAIN: 0
EYE PAIN: 0
ABDOMINAL PAIN: 0
COUGH: 0

## 2023-11-06 NOTE — PROGRESS NOTES
..Review of Systems   Constitutional:  Negative for appetite change, chills and fever. Eyes:  Negative for pain, redness and visual disturbance. Respiratory:  Negative for cough, shortness of breath and wheezing. Cardiovascular:  Negative for chest pain and leg swelling. Gastrointestinal:  Negative for abdominal pain, nausea and vomiting. Genitourinary:  Negative for difficulty urinating, dysuria, flank pain, frequency, hematuria, testicular pain and urgency. Musculoskeletal:  Negative for back pain, joint swelling and myalgias. Skin:  Negative for color change, rash and wound. Neurological:  Negative for dizziness, tremors, weakness, numbness and headaches. Hematological:  Negative for adenopathy. Does not bruise/bleed easily.

## 2023-11-06 NOTE — PROGRESS NOTES
[address]  [address] [facility] [address]  Dept: [address] [place] [address] Urology Office Note - Established    Patient:  Michelle Beach  YOB: 1960  Date: 11/6/2023    The patient is a 66 y.o. male who presents todayfor evaluation of the following problems:   Chief Complaint   Patient presents with    Follow-up     1 year PSA        HPI  This is a very pleasant 56-year-old gentleman who has a history of erectile dysfunction. He has been on tadalafil 5 mg daily and continues to perform well. His PSA is essentially stable at 1.37. He has had a little bit worsening urinary symptoms including nocturia and some daytime frequency. Although he has bothered by this, it is tolerable. Summary of old records: N/A    Additional History: N/A    Procedures Today: N/A    Urinalysis today:  No results found for this visit on 11/06/23. Last several PSA's:  Lab Results   Component Value Date    PSA 1.37 11/03/2023    PSA 1.27 10/06/2022    PSA 1.37 03/22/2021     Last total testosterone:  No results found for: \"TESTOSTERONE\"    AUA Symptom Score (11/6/2023):   INCOMPLETE EMPTYING: How often have you had the sensation of not emptying your bladder?: Less than 1 to 5 times  FREQUENCY: How often do you have to urinate less than every two hours?: Not at all  INTERMITTENCY: How often have you found you stopped and started again several times when you urinated?: Not at all  URGENCY: How often have you found it difficult to postpone urination?: Not at all  WEAK STREAM: How often have you had a weak urinary stream?: Not at all  STRAINING: How often have you had to strain to start  urination?: Not at all  NOCTURIA: How many times did you typically get up at night to uriniate?: 2 Times  TOTAL I-PSS SCORE[de-identified] 3  How would you feel if you were to spend the rest of your life with your urinary condition?: Delighted    Last BUN and

## 2023-11-22 DIAGNOSIS — Z12.11 COLON CANCER SCREENING: Primary | ICD-10-CM

## 2023-12-08 RX ORDER — RAMIPRIL 1.25 MG/1
CAPSULE ORAL
Qty: 90 CAPSULE | Refills: 3 | Status: SHIPPED | OUTPATIENT
Start: 2023-12-08

## 2023-12-29 ENCOUNTER — OFFICE VISIT (OUTPATIENT)
Dept: PRIMARY CARE CLINIC | Age: 63
End: 2023-12-29
Payer: COMMERCIAL

## 2023-12-29 VITALS
OXYGEN SATURATION: 96 % | HEART RATE: 63 BPM | SYSTOLIC BLOOD PRESSURE: 145 MMHG | WEIGHT: 175 LBS | DIASTOLIC BLOOD PRESSURE: 88 MMHG | TEMPERATURE: 98.7 F | BODY MASS INDEX: 24.41 KG/M2

## 2023-12-29 DIAGNOSIS — J01.90 ACUTE BACTERIAL SINUSITIS: Primary | ICD-10-CM

## 2023-12-29 DIAGNOSIS — B96.89 ACUTE BACTERIAL SINUSITIS: Primary | ICD-10-CM

## 2023-12-29 PROCEDURE — 99213 OFFICE O/P EST LOW 20 MIN: CPT | Performed by: NURSE PRACTITIONER

## 2023-12-29 RX ORDER — PREDNISONE 20 MG/1
40 TABLET ORAL DAILY
Qty: 10 TABLET | Refills: 0 | Status: SHIPPED | OUTPATIENT
Start: 2023-12-29 | End: 2024-01-03

## 2023-12-29 RX ORDER — AZITHROMYCIN 250 MG/1
TABLET, FILM COATED ORAL
Qty: 1 PACKET | Refills: 0 | Status: SHIPPED | OUTPATIENT
Start: 2023-12-29

## 2023-12-29 NOTE — PROGRESS NOTES
[address] [facility]  [address]  [address] [place] [id number]  Dept: [phone]  Dept Fax: [phone]     Duran Bermudez is a 66 y.o. male who presents to the urgent care today for his medicalconditions/complaints as noted below. Duran Bermudez is c/o of Sinus Problem (Sinus nasal pressure  different smell when blowing nose three days of sx did have covid three weeks ago)    HPI:      Sinusitis  This is a recurrent problem. The current episode started in the past 7 days. The problem has been gradually worsening since onset. There has been no fever. Associated symptoms include congestion, coughing, ear pain (left) and sinus pressure. Pertinent negatives include no chills, headaches, shortness of breath, sneezing or sore throat. Treatments tried: otc tx. The treatment provided no relief. Had COVID 3-4 weeks ago. Past Medical History:   Diagnosis Date    Arthritis     in hip    Asthma     GERD (gastroesophageal reflux disease)        Current Outpatient Medications   Medication Sig Dispense Refill    azithromycin (ZITHROMAX Z-RICKY) 250 MG tablet Take 2 tabs on day 1 followed by 1 tab on days 2-5. 1 packet 0    predniSONE (DELTASONE) 20 MG tablet Take 2 tablets by mouth daily for 5 days 10 tablet 0    ramipril (ALTACE) 1.25 MG capsule TAKE 1 CAPSULE BY MOUTH ONE TIME A DAY 90 capsule 3    tadalafil (CIALIS) 5 MG tablet Take 1 tablet by mouth daily 90 tablet 3    montelukast (SINGULAIR) 10 MG tablet Take 1 tablet by mouth nightly 90 tablet 2    mometasone-formoterol (DULERA) 200-5 MCG/ACT inhaler Inhale 2 puffs into the lungs in the morning and 2 puffs in the evening.  90 each 3    albuterol sulfate HFA (PROAIR HFA) 108 (90 Base) MCG/ACT inhaler Inhale 2 puffs into the lungs every 6 hours as needed for Wheezing or Shortness of Breath 3 each 3    omeprazole (PRILOSEC) 20 MG delayed release capsule TAKE 1 CAPSULE BY MOUTH 2 TIMES A

## 2024-01-23 ASSESSMENT — PATIENT HEALTH QUESTIONNAIRE - PHQ9
1. LITTLE INTEREST OR PLEASURE IN DOING THINGS: 0
SUM OF ALL RESPONSES TO PHQ QUESTIONS 1-9: 0
2. FEELING DOWN, DEPRESSED OR HOPELESS: 0
2. FEELING DOWN, DEPRESSED OR HOPELESS: NOT AT ALL
SUM OF ALL RESPONSES TO PHQ QUESTIONS 1-9: 0
SUM OF ALL RESPONSES TO PHQ9 QUESTIONS 1 & 2: 0
SUM OF ALL RESPONSES TO PHQ QUESTIONS 1-9: 0
SUM OF ALL RESPONSES TO PHQ9 QUESTIONS 1 & 2: 0
1. LITTLE INTEREST OR PLEASURE IN DOING THINGS: NOT AT ALL
SUM OF ALL RESPONSES TO PHQ QUESTIONS 1-9: 0

## 2024-01-24 ENCOUNTER — OFFICE VISIT (OUTPATIENT)
Dept: FAMILY MEDICINE CLINIC | Age: 64
End: 2024-01-24
Payer: COMMERCIAL

## 2024-01-24 VITALS
OXYGEN SATURATION: 97 % | DIASTOLIC BLOOD PRESSURE: 70 MMHG | TEMPERATURE: 97.2 F | WEIGHT: 185.4 LBS | BODY MASS INDEX: 25.96 KG/M2 | SYSTOLIC BLOOD PRESSURE: 118 MMHG | HEART RATE: 59 BPM | HEIGHT: 71 IN

## 2024-01-24 DIAGNOSIS — Z13.1 DIABETES MELLITUS SCREENING: ICD-10-CM

## 2024-01-24 DIAGNOSIS — Z13.6 ENCOUNTER FOR LIPID SCREENING FOR CARDIOVASCULAR DISEASE: ICD-10-CM

## 2024-01-24 DIAGNOSIS — J45.51 ASTHMA, SEVERE PERSISTENT, POORLY-CONTROLLED, WITH ACUTE EXACERBATION: ICD-10-CM

## 2024-01-24 DIAGNOSIS — Z13.220 ENCOUNTER FOR LIPID SCREENING FOR CARDIOVASCULAR DISEASE: ICD-10-CM

## 2024-01-24 DIAGNOSIS — Z00.00 ENCOUNTER FOR WELL ADULT EXAM WITHOUT ABNORMAL FINDINGS: Primary | ICD-10-CM

## 2024-01-24 PROCEDURE — 99386 PREV VISIT NEW AGE 40-64: CPT | Performed by: FAMILY MEDICINE

## 2024-01-24 SDOH — ECONOMIC STABILITY: FOOD INSECURITY: WITHIN THE PAST 12 MONTHS, YOU WORRIED THAT YOUR FOOD WOULD RUN OUT BEFORE YOU GOT MONEY TO BUY MORE.: NEVER TRUE

## 2024-01-24 SDOH — ECONOMIC STABILITY: INCOME INSECURITY: HOW HARD IS IT FOR YOU TO PAY FOR THE VERY BASICS LIKE FOOD, HOUSING, MEDICAL CARE, AND HEATING?: NOT HARD AT ALL

## 2024-01-24 SDOH — ECONOMIC STABILITY: FOOD INSECURITY: WITHIN THE PAST 12 MONTHS, THE FOOD YOU BOUGHT JUST DIDN'T LAST AND YOU DIDN'T HAVE MONEY TO GET MORE.: NEVER TRUE

## 2024-01-24 SDOH — ECONOMIC STABILITY: HOUSING INSECURITY
IN THE LAST 12 MONTHS, WAS THERE A TIME WHEN YOU DID NOT HAVE A STEADY PLACE TO SLEEP OR SLEPT IN A SHELTER (INCLUDING NOW)?: NO

## 2024-01-24 NOTE — PROGRESS NOTES
fluticasone-salmeterol (ADVAIR) 250-50 MCG/ACT AEPB diskus inhaler   Provider, MD Elba   methocarbamol (ROBAXIN) 500 MG tablet   Provider, MD Elba         Past Medical History:   Diagnosis Date    Allergic rhinitis     Arthritis     in hip    Asthma     GERD (gastroesophageal reflux disease)     Osteoarthritis        Past Surgical History:   Procedure Laterality Date    COLONOSCOPY  2018    polyp removed    COLONOSCOPY N/A 2018    COLONOSCOPY POLYPECTOMY SNARE/COLD BIOPSY performed by Mina Atkins MD at [facility] OR    EYE SURGERY      PAIN MANAGEMENT PROCEDURE Right 2020    RIGHT L4 AND L5 EPIDURAL STEROID INJECTION performed by Evan Sanchez MD at [facility] OR    PAIN MANAGEMENT PROCEDURE N/A 10/28/2021    BASIVERTEBRAL NERVE NERVE RADIOFREQUENCY ABLATION INTRACEPT PROCEDURE at L4 / L5 / S1 performed by Evan Sanchez MD at [facility] OR    REFRACTIVE SURGERY      VASECTOMY           Family History   Problem Relation Age of Onset    No Known Problems Mother     Diabetes Father        Social History     Tobacco Use    Smoking status: Former     Types: Cigars     Quit date:      Years since quittin.0    Smokeless tobacco: Never    Tobacco comments:     patient states 4 cigars a year    Vaping Use    Vaping Use: Never used   Substance Use Topics    Alcohol use: Yes     Comment: Maybe 2-3 alcoholic beverages per month    Drug use: No       Objective     Vital Signs  /70   Pulse 59   Temp 97.2 °F (36.2 °C)   Ht 1.803 m (5' 10.98\")   Wt 84.1 kg (185 lb 6.4 oz)   SpO2 97%   BMI 25.87 kg/m²   Wt Readings from Last 3 Encounters:   24 84.1 kg (185 lb 6.4 oz)   23 79.4 kg (175 lb)   23 79.4 kg (175 lb)       Waist Circumference  There were no vitals filed for this visit.    Physical Exam  HENT:   /70   Pulse 59   Temp 97.2 °F (36.2 °C)   Ht 1.803 m (5' 10.98\")   Wt 84.1 kg (185 lb 6.4 oz)   SpO2 97%   BMI 25.87 kg/m²   Constitutional: Alert and

## 2024-01-24 NOTE — PATIENT INSTRUCTIONS
A Healthy Lifestyle: Care Instructions  A healthy lifestyle can help you feel good, have more energy, and stay at a weight that's healthy for you. You can share a healthy lifestyle with your friends and family. And you can do it on your own.    Eat meals with your friends or family. You could try cooking together.   Plan activities with other people. Go for a walk with a friend, try a free online fitness class, or join a sports league.     Eat a variety of healthy foods. These include fruits, vegetables, whole grains, low-fat dairy, and lean protein.   Choose healthy portions of food. You can use the Nutrition Facts label on food packages as a guide.     Eat more fruits and vegetables. You could add vegetables to sandwiches or add fruit to cereal.   Drink water when you are thirsty. Limit soda, juice, and sports drinks.     Try to exercise most days. Aim for at least 2½ hours of exercise each week.   Keep moving. Work in the garden or take your dog on a walk. Use the stairs instead of the elevator.     If you use tobacco or nicotine, try to quit. Ask your doctor about programs and medicines to help you quit.   Limit alcohol. Men should have no more than 2 drinks a day. Women should have no more than 1. For some people, no alcohol is the best choice.   Follow-up care is a key part of your treatment and safety. Be sure to make and go to all appointments, and call your doctor if you are having problems. It's also a good idea to know your test results and keep a list of the medicines you take.  Where can you learn more?  Go to https://www.[employer].net/patientEd and enter U807 to learn more about \"A Healthy Lifestyle: Care Instructions.\"  Current as of: August 6, 2023               Content Version: 13.9  © 5760-8964 [employer].   Care instructions adapted under license by [employer]. If you have questions about a medical condition or this instruction, always ask your healthcare professional.

## 2024-02-01 ENCOUNTER — HOSPITAL ENCOUNTER (OUTPATIENT)
Age: 64
Setting detail: SPECIMEN
Discharge: HOME | End: 2024-02-01

## 2024-02-01 DIAGNOSIS — Z13.1 DIABETES MELLITUS SCREENING: ICD-10-CM

## 2024-02-01 DIAGNOSIS — Z13.6 ENCOUNTER FOR LIPID SCREENING FOR CARDIOVASCULAR DISEASE: ICD-10-CM

## 2024-02-01 DIAGNOSIS — Z13.220 ENCOUNTER FOR LIPID SCREENING FOR CARDIOVASCULAR DISEASE: ICD-10-CM

## 2024-02-01 LAB
CHOLEST SERPL-MCNC: 184 MG/DL
CHOLESTEROL/HDL RATIO: 4.2
GLUCOSE SERPL-MCNC: 87 MG/DL (ref 70–99)
HDLC SERPL-MCNC: 44 MG/DL
LDLC SERPL CALC-MCNC: 126 MG/DL (ref 0–130)
PATIENT FASTING?: NORMAL
TRIGL SERPL-MCNC: 71 MG/DL

## 2024-02-15 ENCOUNTER — OFFICE VISIT (OUTPATIENT)
Dept: PRIMARY CARE CLINIC | Age: 64
End: 2024-02-15
Payer: COMMERCIAL

## 2024-02-15 VITALS
HEART RATE: 52 BPM | OXYGEN SATURATION: 96 % | SYSTOLIC BLOOD PRESSURE: 160 MMHG | DIASTOLIC BLOOD PRESSURE: 98 MMHG | TEMPERATURE: 97.6 F

## 2024-02-15 DIAGNOSIS — R03.0 ELEVATED BLOOD PRESSURE READING: ICD-10-CM

## 2024-02-15 DIAGNOSIS — B96.89 ACUTE BACTERIAL SINUSITIS: Primary | ICD-10-CM

## 2024-02-15 DIAGNOSIS — J01.90 ACUTE BACTERIAL SINUSITIS: Primary | ICD-10-CM

## 2024-02-15 PROCEDURE — 99213 OFFICE O/P EST LOW 20 MIN: CPT | Performed by: NURSE PRACTITIONER

## 2024-02-15 RX ORDER — PREDNISONE 20 MG/1
40 TABLET ORAL DAILY
Qty: 10 TABLET | Refills: 0 | Status: SHIPPED | OUTPATIENT
Start: 2024-02-15 | End: 2024-02-20

## 2024-02-15 RX ORDER — AZITHROMYCIN 250 MG/1
TABLET, FILM COATED ORAL
Qty: 1 PACKET | Refills: 0 | Status: SHIPPED | OUTPATIENT
Start: 2024-02-15

## 2024-02-15 NOTE — PROGRESS NOTES
[facility] PHYSICIANS [facility], [facility] IN [facility]  [address] [facility] [address]  Dept: [phone]  Dept Fax: [phone]     Isai Chapa is a 63 y.o. male who presents to the urgent care today for his medicalconditions/complaints as noted below.  Isai Chapa is c/o of Head Congestion (With ha - started approx 1 wk ago)    HPI:      Sinusitis  This is a new problem. The current episode started in the past 7 days. The problem is unchanged. There has been no fever. Associated symptoms include congestion, coughing (dry, slight), headaches and sinus pressure. Pertinent negatives include no chills, ear pain, shortness of breath, sneezing or sore throat. Treatments tried: otc tx. The treatment provided no relief.     12/29 was treated for sinusitis with azithromycin and prednisone.    Past Medical History:   Diagnosis Date    Allergic rhinitis     Arthritis     in hip    Asthma     GERD (gastroesophageal reflux disease)     Osteoarthritis       Current Outpatient Medications   Medication Sig Dispense Refill    azithromycin (ZITHROMAX) 250 MG tablet Take 2 tabs (500 mg) on Day 1, and take 1 tab (250 mg) on days 2 through 5. 1 packet 0    predniSONE (DELTASONE) 20 MG tablet Take 2 tablets by mouth daily for 5 days 10 tablet 0    ramipril (ALTACE) 1.25 MG capsule TAKE 1 CAPSULE BY MOUTH ONE TIME A DAY 90 capsule 3    montelukast (SINGULAIR) 10 MG tablet Take 1 tablet by mouth nightly 90 tablet 2    mometasone-formoterol (DULERA) 200-5 MCG/ACT inhaler Inhale 2 puffs into the lungs in the morning and 2 puffs in the evening. 90 each 3    albuterol sulfate HFA (PROAIR HFA) 108 (90 Base) MCG/ACT inhaler Inhale 2 puffs into the lungs every 6 hours as needed for Wheezing or Shortness of Breath 3 each 3    omeprazole (PRILOSEC) 20 MG delayed release capsule TAKE 1 CAPSULE BY MOUTH 2 TIMES A  capsule 1    SUMAtriptan (IMITREX) 100 MG tablet TAKE ONE TABLET BY MOUTH

## 2024-02-19 ENCOUNTER — TELEPHONE (OUTPATIENT)
Dept: PRIMARY CARE CLINIC | Age: 64
End: 2024-02-19

## 2024-02-19 RX ORDER — SUMATRIPTAN 100 MG/1
100 TABLET, FILM COATED ORAL DAILY PRN
Qty: 18 TABLET | Refills: 0 | Status: SHIPPED | OUTPATIENT
Start: 2024-02-19

## 2024-02-19 NOTE — TELEPHONE ENCOUNTER
Pt states he still has sinus pressure and drainage. Pt is requesting another zpak. Please advise.

## 2024-02-20 RX ORDER — AMOXICILLIN AND CLAVULANATE POTASSIUM 875; 125 MG/1; MG/1
1 TABLET, FILM COATED ORAL 2 TIMES DAILY
Qty: 14 TABLET | Refills: 0 | Status: SHIPPED | OUTPATIENT
Start: 2024-02-20 | End: 2024-02-27

## 2024-02-20 NOTE — TELEPHONE ENCOUNTER
Can you check this for this pt, he called again today asking for something else for this sinusitis that didn't go away.

## 2024-03-26 ENCOUNTER — PATIENT MESSAGE (OUTPATIENT)
Dept: NEUROSURGERY | Age: 64
End: 2024-03-26

## 2024-03-26 DIAGNOSIS — M47.816 LUMBAR FACET ARTHROPATHY: ICD-10-CM

## 2024-03-26 DIAGNOSIS — M47.816 LUMBAR SPONDYLOSIS: Primary | ICD-10-CM

## 2024-03-27 RX ORDER — PREDNISONE 20 MG/1
TABLET ORAL
Qty: 30 TABLET | Refills: 0 | Status: SHIPPED | OUTPATIENT
Start: 2024-03-27 | End: 2024-04-11

## 2024-03-27 NOTE — TELEPHONE ENCOUNTER
Last seen 11/6/23  Plan:   Cont tadalafil  F/u one year psa  LUTS a bit worse but tolerable      Return in about 1 year (around 11/6/2024) for psa.

## 2024-03-27 NOTE — TELEPHONE ENCOUNTER
From: Isai Chapa  To: Yasmin Barnes  Sent: 3/26/2024 2:47 PM EDT  Subject: New prescription    Hi Yasmin,  My back has been flaring up for the past week or two. Could you send a prescription to [facility] for some steroids which you were prescribed in the past?    Thank you   Angelo

## 2024-03-28 RX ORDER — TADALAFIL 5 MG/1
5 TABLET ORAL DAILY
Qty: 90 TABLET | Refills: 3 | Status: SHIPPED | OUTPATIENT
Start: 2024-03-28

## 2024-04-09 DIAGNOSIS — J45.51 ASTHMA, SEVERE PERSISTENT, POORLY-CONTROLLED, WITH ACUTE EXACERBATION: ICD-10-CM

## 2024-04-22 ENCOUNTER — HOSPITAL ENCOUNTER (OUTPATIENT)
Dept: PREADMISSION TESTING | Age: 64
Discharge: HOME | End: 2024-04-26
Payer: COMMERCIAL

## 2024-04-22 VITALS
WEIGHT: 180.9 LBS | HEART RATE: 59 BPM | TEMPERATURE: 97.3 F | RESPIRATION RATE: 16 BRPM | OXYGEN SATURATION: 97 % | HEIGHT: 71 IN | BODY MASS INDEX: 25.32 KG/M2 | SYSTOLIC BLOOD PRESSURE: 138 MMHG | DIASTOLIC BLOOD PRESSURE: 82 MMHG

## 2024-04-22 LAB
ANION GAP SERPL CALCULATED.3IONS-SCNC: 11 MMOL/L (ref 9–17)
BUN SERPL-MCNC: 22 MG/DL (ref 8–23)
BUN/CREAT SERPL: 28 (ref 9–20)
CALCIUM SERPL-MCNC: 9.3 MG/DL (ref 8.6–10.4)
CHLORIDE SERPL-SCNC: 103 MMOL/L (ref 98–107)
CO2 SERPL-SCNC: 24 MMOL/L (ref 20–31)
CREAT SERPL-MCNC: 0.8 MG/DL (ref 0.7–1.2)
ERYTHROCYTE [DISTWIDTH] IN BLOOD BY AUTOMATED COUNT: 13.2 % (ref 11.8–14.4)
GFR SERPL CREATININE-BSD FRML MDRD: >90 ML/MIN/1.73M2
GLUCOSE SERPL-MCNC: 90 MG/DL (ref 70–99)
HCT VFR BLD AUTO: 47.9 % (ref 40.7–50.3)
HGB BLD-MCNC: 15.4 G/DL (ref 13–17)
MCH RBC QN AUTO: 30.7 PG (ref 25.2–33.5)
MCHC RBC AUTO-ENTMCNC: 32.2 G/DL (ref 28.4–34.8)
MCV RBC AUTO: 95.4 FL (ref 82.6–102.9)
NRBC BLD-RTO: 0 PER 100 WBC
PLATELET # BLD AUTO: 174 K/UL (ref 138–453)
PMV BLD AUTO: 9.9 FL (ref 8.1–13.5)
POTASSIUM SERPL-SCNC: 4.3 MMOL/L (ref 3.7–5.3)
RBC # BLD AUTO: 5.02 M/UL (ref 4.21–5.77)
SODIUM SERPL-SCNC: 138 MMOL/L (ref 135–144)
WBC OTHER # BLD: 8.4 K/UL (ref 3.5–11.3)

## 2024-04-22 PROCEDURE — 93005 ELECTROCARDIOGRAM TRACING: CPT | Performed by: ANESTHESIOLOGY

## 2024-04-22 PROCEDURE — 85027 COMPLETE CBC AUTOMATED: CPT

## 2024-04-22 PROCEDURE — 36415 COLL VENOUS BLD VENIPUNCTURE: CPT

## 2024-04-22 PROCEDURE — 80048 BASIC METABOLIC PNL TOTAL CA: CPT

## 2024-04-22 NOTE — PRE-PROCEDURE INSTRUCTIONS
Shower the night before and the morning of surgery using the instructions below. If you are allergic to Chlorhexidine Gluconate (CHG) use antibacterial soap instead.    If you were given Chlorhexidine soap, please follow the instructions included with the soap to shower the night before and the morning of surgery.  If you were not given Chlorhexidine, please shower or bathe the morning of surgery using an antibacterial soap.  Please dress in clean clothing after showering.     General information about Chlorhexidine Gluconate (CHG)   * It should not be used on hair, face, ears, genital area or skin that is not intact.   * It should not be used if breast feeding.   * It should not be used if you allergic to CHG.   * See the bottle for additional information.    Do Not apply any powder, deodorant, lotion/cream/oil, perfume/aftershave, cosmetics, or alcohol-based skin or hair products after showering.    Do Not shave near the planned surgical site unless specifically instructed to.     1. Wash your hair using your normal shampoo and rinse.   2. Wash your face and genital area (privates) using your own shampoo and rinse.   3. Turn off the shower water and pour half of the bottle of CHG onto a clean washcloth.   4. Wash your body from neck down to toes. Pay special attention to your surgical site.   5. Leave the CHG on your skin for 5 minutes and rinse it off.   6. Pat dry with a clean towel, sleep in clean clothes and clean sheets.   7. Do not shave near the surgical site.   8. Repeat process the morning of surgery.    CHG may cause dry skin, but should not cause redness, rash, or intense itching. If an suspect an allergic reaction, use your own soap and shampoo for the morning of surgery and report reaction to the pre-operative nurse.      Additional Instructions:      1. If you are having any type of anesthesia, you are to have NOTHING to eat or drink after midnight the night before surgery.  This includes no gum,

## 2024-04-23 LAB
EKG ATRIAL RATE: 56 BPM
EKG P AXIS: 27 DEGREES
EKG P-R INTERVAL: 120 MS
EKG Q-T INTERVAL: 410 MS
EKG QRS DURATION: 94 MS
EKG QTC CALCULATION (BAZETT): 395 MS
EKG R AXIS: -39 DEGREES
EKG T AXIS: 24 DEGREES
EKG VENTRICULAR RATE: 56 BPM

## 2024-04-23 PROCEDURE — 93010 ELECTROCARDIOGRAM REPORT: CPT | Performed by: INTERNAL MEDICINE

## 2024-05-06 NOTE — H&P
lightheadedness, numbness, pain, tingling extremities  BEHAVIOR/PSYCH:  negative for depression, anxiety    Physical Exam:   BP (!) 142/93   Pulse 58   Temp 98.4 °F (36.9 °C)   Resp 16   Ht 1.803 m (5' 11\")   Wt 81.6 kg (180 lb)   SpO2 94%   BMI 25.10 kg/m²      General Appearance:  alert, well appearing, and in no acute distress  Mental status: oriented to person, place, and time with normal affect  Head:  normocephalic, atraumatic.  Eye: reading glasses no icterus, redness, pupils equal and reactive, extraocular eye movements intact, conjunctiva clear  Ear: normal external ear, no discharge, hearing intact  Nose:  no drainage noted  Mouth: mucous membranes moist  Neck: supple, no carotid bruits, thyroid not palpable  Lungs: SpO2 94% Bilateral equal air entry, clear to ausculation, no wheezing, rales or rhonchi, normal effort  Cardiovascular: HR 58 asymptomatic bradycardic rate and regular rhythm, no murmur, gallop, rub.  Abdomen: periumbilical hernia  Deferred inguinal hernia to Dr Velazquez Soft, nontender, nondistended, normal bowel sounds  Neurologic: There are no new focal motor or sensory deficits, normal muscle tone and bulk, no abnormal sensation, normal speech, cranial nerves II through XII grossly intact  Skin: No gross lesions, rashes, bruising or bleeding on exposed skin area  Extremities:  peripheral pulses palpable, no pedal edema or calf pain with palpation  Psych: normal affect     Investigations:      Laboratory Testing:  No results found for this or any previous visit (from the past 24 hour(s)).    Recent Labs     04/22/24  1424   HGB 15.4   HCT 47.9   WBC 8.4   MCV 95.4      K 4.3      CO2 24   BUN 22   CREATININE 0.8   GLUCOSE 90       No results for input(s): \"COVID19\" in the last 720 hours.  Imaging/Diagnostics:    No results found.    Diagnosis:      Left inguinal hernia    Plans:     1. LEFT HERNIA INGUINAL REPAIR LAPAROSCOPIC ROBOTIC XI  POSSIBLE BILATERAL      Mariam Jeter

## 2024-05-07 ENCOUNTER — ANESTHESIA (OUTPATIENT)
Dept: OPERATING ROOM | Age: 64
End: 2024-05-07
Payer: COMMERCIAL

## 2024-05-07 ENCOUNTER — ANESTHESIA EVENT (OUTPATIENT)
Dept: OPERATING ROOM | Age: 64
End: 2024-05-07
Payer: COMMERCIAL

## 2024-05-07 ENCOUNTER — HOSPITAL ENCOUNTER (OUTPATIENT)
Age: 64
Setting detail: OUTPATIENT SURGERY
Discharge: HOME | End: 2024-05-07
Attending: SURGERY | Admitting: SURGERY
Payer: COMMERCIAL

## 2024-05-07 VITALS
HEART RATE: 66 BPM | SYSTOLIC BLOOD PRESSURE: 145 MMHG | BODY MASS INDEX: 25.2 KG/M2 | WEIGHT: 180 LBS | HEIGHT: 71 IN | TEMPERATURE: 97.5 F | OXYGEN SATURATION: 96 % | DIASTOLIC BLOOD PRESSURE: 87 MMHG | RESPIRATION RATE: 14 BRPM

## 2024-05-07 DIAGNOSIS — G89.18 POSTOPERATIVE PAIN: Primary | ICD-10-CM

## 2024-05-07 PROCEDURE — C1781 MESH (IMPLANTABLE): HCPCS | Performed by: SURGERY

## 2024-05-07 PROCEDURE — 2500000003 HC RX 250 WO HCPCS: Performed by: SURGERY

## 2024-05-07 PROCEDURE — 2580000003 HC RX 258: Performed by: ANESTHESIOLOGY

## 2024-05-07 PROCEDURE — 7100000011 HC PHASE II RECOVERY - ADDTL 15 MIN: Performed by: SURGERY

## 2024-05-07 PROCEDURE — 3700000000 HC ANESTHESIA ATTENDED CARE: Performed by: SURGERY

## 2024-05-07 PROCEDURE — 7100000010 HC PHASE II RECOVERY - FIRST 15 MIN: Performed by: SURGERY

## 2024-05-07 PROCEDURE — 3600000009 HC SURGERY ROBOT BASE: Performed by: SURGERY

## 2024-05-07 PROCEDURE — 6360000002 HC RX W HCPCS: Performed by: NURSE ANESTHETIST, CERTIFIED REGISTERED

## 2024-05-07 PROCEDURE — 6370000000 HC RX 637 (ALT 250 FOR IP): Performed by: ANESTHESIOLOGY

## 2024-05-07 PROCEDURE — 6360000002 HC RX W HCPCS: Performed by: SURGERY

## 2024-05-07 PROCEDURE — 2709999900 HC NON-CHARGEABLE SUPPLY: Performed by: SURGERY

## 2024-05-07 PROCEDURE — 3600000019 HC SURGERY ROBOT ADDTL 15MIN: Performed by: SURGERY

## 2024-05-07 PROCEDURE — 7100000001 HC PACU RECOVERY - ADDTL 15 MIN: Performed by: SURGERY

## 2024-05-07 PROCEDURE — 2500000003 HC RX 250 WO HCPCS: Performed by: NURSE ANESTHETIST, CERTIFIED REGISTERED

## 2024-05-07 PROCEDURE — 7100000000 HC PACU RECOVERY - FIRST 15 MIN: Performed by: SURGERY

## 2024-05-07 PROCEDURE — 3700000001 HC ADD 15 MINUTES (ANESTHESIA): Performed by: SURGERY

## 2024-05-07 PROCEDURE — S2900 ROBOTIC SURGICAL SYSTEM: HCPCS | Performed by: SURGERY

## 2024-05-07 DEVICE — LAPAROSCOPIC SELF-FIXATING MESH POLYESTER WITH POLYLACTIC ACID GRIPS AND COLLAGEN FILM
Type: IMPLANTABLE DEVICE | Site: ABDOMEN | Status: FUNCTIONAL
Brand: PROGRIP

## 2024-05-07 RX ORDER — SODIUM CHLORIDE 0.9 % (FLUSH) 0.9 %
5-40 SYRINGE (ML) INJECTION EVERY 12 HOURS SCHEDULED
Status: DISCONTINUED | OUTPATIENT
Start: 2024-05-07 | End: 2024-05-07 | Stop reason: HOSPADM

## 2024-05-07 RX ORDER — SODIUM CHLORIDE 0.9 % (FLUSH) 0.9 %
5-40 SYRINGE (ML) INJECTION PRN
Status: DISCONTINUED | OUTPATIENT
Start: 2024-05-07 | End: 2024-05-07 | Stop reason: HOSPADM

## 2024-05-07 RX ORDER — ONDANSETRON 2 MG/ML
4 INJECTION INTRAMUSCULAR; INTRAVENOUS
Status: DISCONTINUED | OUTPATIENT
Start: 2024-05-07 | End: 2024-05-07 | Stop reason: HOSPADM

## 2024-05-07 RX ORDER — FENTANYL CITRATE 50 UG/ML
25 INJECTION, SOLUTION INTRAMUSCULAR; INTRAVENOUS EVERY 5 MIN PRN
Status: DISCONTINUED | OUTPATIENT
Start: 2024-05-07 | End: 2024-05-07 | Stop reason: HOSPADM

## 2024-05-07 RX ORDER — HYDROCODONE BITARTRATE AND ACETAMINOPHEN 5; 325 MG/1; MG/1
1 TABLET ORAL EVERY 6 HOURS PRN
Qty: 21 TABLET | Refills: 0 | Status: SHIPPED | OUTPATIENT
Start: 2024-05-07 | End: 2024-05-14

## 2024-05-07 RX ORDER — FENTANYL CITRATE 50 UG/ML
50 INJECTION, SOLUTION INTRAMUSCULAR; INTRAVENOUS EVERY 5 MIN PRN
Status: DISCONTINUED | OUTPATIENT
Start: 2024-05-07 | End: 2024-05-07 | Stop reason: HOSPADM

## 2024-05-07 RX ORDER — LIDOCAINE HYDROCHLORIDE AND EPINEPHRINE 10; 10 MG/ML; UG/ML
INJECTION, SOLUTION INFILTRATION; PERINEURAL PRN
Status: DISCONTINUED | OUTPATIENT
Start: 2024-05-07 | End: 2024-05-07 | Stop reason: ALTCHOICE

## 2024-05-07 RX ORDER — DIPHENHYDRAMINE HYDROCHLORIDE 50 MG/ML
12.5 INJECTION INTRAMUSCULAR; INTRAVENOUS
Status: DISCONTINUED | OUTPATIENT
Start: 2024-05-07 | End: 2024-05-07 | Stop reason: HOSPADM

## 2024-05-07 RX ORDER — FENTANYL CITRATE 50 UG/ML
INJECTION, SOLUTION INTRAMUSCULAR; INTRAVENOUS PRN
Status: DISCONTINUED | OUTPATIENT
Start: 2024-05-07 | End: 2024-05-07 | Stop reason: SDUPTHER

## 2024-05-07 RX ORDER — KETOROLAC TROMETHAMINE 30 MG/ML
INJECTION, SOLUTION INTRAMUSCULAR; INTRAVENOUS PRN
Status: DISCONTINUED | OUTPATIENT
Start: 2024-05-07 | End: 2024-05-07 | Stop reason: SDUPTHER

## 2024-05-07 RX ORDER — DEXAMETHASONE SODIUM PHOSPHATE 10 MG/ML
INJECTION, SOLUTION INTRAMUSCULAR; INTRAVENOUS PRN
Status: DISCONTINUED | OUTPATIENT
Start: 2024-05-07 | End: 2024-05-07 | Stop reason: SDUPTHER

## 2024-05-07 RX ORDER — ROCURONIUM BROMIDE 10 MG/ML
INJECTION, SOLUTION INTRAVENOUS PRN
Status: DISCONTINUED | OUTPATIENT
Start: 2024-05-07 | End: 2024-05-07 | Stop reason: SDUPTHER

## 2024-05-07 RX ORDER — SODIUM CHLORIDE, SODIUM LACTATE, POTASSIUM CHLORIDE, CALCIUM CHLORIDE 600; 310; 30; 20 MG/100ML; MG/100ML; MG/100ML; MG/100ML
INJECTION, SOLUTION INTRAVENOUS CONTINUOUS
Status: DISCONTINUED | OUTPATIENT
Start: 2024-05-07 | End: 2024-05-07 | Stop reason: HOSPADM

## 2024-05-07 RX ORDER — SODIUM CHLORIDE 9 MG/ML
INJECTION, SOLUTION INTRAVENOUS PRN
Status: DISCONTINUED | OUTPATIENT
Start: 2024-05-07 | End: 2024-05-07 | Stop reason: HOSPADM

## 2024-05-07 RX ORDER — SODIUM CHLORIDE 9 MG/ML
INJECTION, SOLUTION INTRAVENOUS CONTINUOUS
Status: DISCONTINUED | OUTPATIENT
Start: 2024-05-07 | End: 2024-05-07

## 2024-05-07 RX ORDER — SENNA AND DOCUSATE SODIUM 50; 8.6 MG/1; MG/1
1 TABLET, FILM COATED ORAL DAILY
Qty: 7 TABLET | Refills: 0 | Status: SHIPPED | OUTPATIENT
Start: 2024-05-07 | End: 2024-05-14

## 2024-05-07 RX ORDER — ONDANSETRON 4 MG/1
4 TABLET, ORALLY DISINTEGRATING ORAL EVERY 8 HOURS PRN
Qty: 15 TABLET | Refills: 0 | Status: SHIPPED | OUTPATIENT
Start: 2024-05-07 | End: 2024-05-12

## 2024-05-07 RX ORDER — PROPOFOL 10 MG/ML
INJECTION, EMULSION INTRAVENOUS PRN
Status: DISCONTINUED | OUTPATIENT
Start: 2024-05-07 | End: 2024-05-07 | Stop reason: SDUPTHER

## 2024-05-07 RX ORDER — OXYCODONE HYDROCHLORIDE 5 MG/1
5 TABLET ORAL
Status: COMPLETED | OUTPATIENT
Start: 2024-05-07 | End: 2024-05-07

## 2024-05-07 RX ORDER — LIDOCAINE HYDROCHLORIDE 10 MG/ML
1 INJECTION, SOLUTION EPIDURAL; INFILTRATION; INTRACAUDAL; PERINEURAL
Status: DISCONTINUED | OUTPATIENT
Start: 2024-05-07 | End: 2024-05-07 | Stop reason: HOSPADM

## 2024-05-07 RX ORDER — ONDANSETRON 2 MG/ML
INJECTION INTRAMUSCULAR; INTRAVENOUS PRN
Status: DISCONTINUED | OUTPATIENT
Start: 2024-05-07 | End: 2024-05-07 | Stop reason: SDUPTHER

## 2024-05-07 RX ORDER — LIDOCAINE HYDROCHLORIDE 20 MG/ML
INJECTION, SOLUTION EPIDURAL; INFILTRATION; INTRACAUDAL; PERINEURAL PRN
Status: DISCONTINUED | OUTPATIENT
Start: 2024-05-07 | End: 2024-05-07 | Stop reason: SDUPTHER

## 2024-05-07 RX ORDER — BUPIVACAINE HYDROCHLORIDE 5 MG/ML
INJECTION, SOLUTION EPIDURAL; INTRACAUDAL PRN
Status: DISCONTINUED | OUTPATIENT
Start: 2024-05-07 | End: 2024-05-07 | Stop reason: ALTCHOICE

## 2024-05-07 RX ADMIN — Medication 2000 MG: at 08:03

## 2024-05-07 RX ADMIN — ROCURONIUM BROMIDE 50 MG: 50 INJECTION INTRAVENOUS at 07:52

## 2024-05-07 RX ADMIN — FENTANYL CITRATE 50 MCG: 50 INJECTION INTRAMUSCULAR; INTRAVENOUS at 07:52

## 2024-05-07 RX ADMIN — SODIUM CHLORIDE, POTASSIUM CHLORIDE, SODIUM LACTATE AND CALCIUM CHLORIDE: 600; 310; 30; 20 INJECTION, SOLUTION INTRAVENOUS at 08:15

## 2024-05-07 RX ADMIN — OXYCODONE HYDROCHLORIDE 5 MG: 5 TABLET ORAL at 10:17

## 2024-05-07 RX ADMIN — KETOROLAC TROMETHAMINE 15 MG: 30 INJECTION, SOLUTION INTRAMUSCULAR at 08:42

## 2024-05-07 RX ADMIN — DEXAMETHASONE SODIUM PHOSPHATE 10 MG: 10 INJECTION INTRAMUSCULAR; INTRAVENOUS at 07:59

## 2024-05-07 RX ADMIN — LIDOCAINE HYDROCHLORIDE 60 MG: 20 INJECTION, SOLUTION EPIDURAL; INFILTRATION; INTRACAUDAL; PERINEURAL at 07:52

## 2024-05-07 RX ADMIN — PROPOFOL 150 MG: 10 INJECTION, EMULSION INTRAVENOUS at 07:52

## 2024-05-07 RX ADMIN — FENTANYL CITRATE 50 MCG: 50 INJECTION INTRAMUSCULAR; INTRAVENOUS at 08:21

## 2024-05-07 RX ADMIN — ONDANSETRON 4 MG: 2 INJECTION INTRAMUSCULAR; INTRAVENOUS at 07:59

## 2024-05-07 RX ADMIN — SODIUM CHLORIDE, POTASSIUM CHLORIDE, SODIUM LACTATE AND CALCIUM CHLORIDE: 600; 310; 30; 20 INJECTION, SOLUTION INTRAVENOUS at 06:30

## 2024-05-07 ASSESSMENT — PAIN DESCRIPTION - ORIENTATION: ORIENTATION: MID

## 2024-05-07 ASSESSMENT — PAIN - FUNCTIONAL ASSESSMENT
PAIN_FUNCTIONAL_ASSESSMENT: FACE, LEGS, ACTIVITY, CRY, AND CONSOLABILITY (FLACC)
PAIN_FUNCTIONAL_ASSESSMENT: 0-10

## 2024-05-07 ASSESSMENT — PAIN SCALES - GENERAL
PAINLEVEL_OUTOF10: 5
PAINLEVEL_OUTOF10: 4

## 2024-05-07 ASSESSMENT — PAIN DESCRIPTION - DESCRIPTORS: DESCRIPTORS: ACHING

## 2024-05-07 ASSESSMENT — ENCOUNTER SYMPTOMS: SHORTNESS OF BREATH: 0

## 2024-05-07 ASSESSMENT — PAIN DESCRIPTION - LOCATION: LOCATION: ABDOMEN

## 2024-05-07 NOTE — ANESTHESIA PRE PROCEDURE
Department of Anesthesiology  Preprocedure Note       Name:  Isai Chapa   Age:  64 y.o.  :  1960                                          MRN:  [id number]         Date:  2024      Surgeon: Surgeon(s):  Manuel Velazquez MD    Procedure: Procedure(s):  LEFT HERNIA INGUINAL REPAIR LAPAROSCOPIC ROBOTIC XI  POSSIBLE BILATERAL    Medications prior to admission:   Prior to Admission medications    Medication Sig Start Date End Date Taking? Authorizing Provider   mometasone-formoterol (DULERA) 200-5 MCG/ACT inhaler Inhale 2 puffs into the lungs in the morning and 2 puffs in the evening. 24   Shady Cox DO   tadalafil (CIALIS) 5 MG tablet Take 1 tablet by mouth daily  Patient taking differently: Take 1 tablet by mouth daily Patient takes on Tuesday, Thursday, Saturday and [place] 3/28/24   Maurizio Resendiz MD   SUMAtriptan (IMITREX) 100 MG tablet Take 1 tablet by mouth daily as needed for Migraine 24   Yanique Feldman MD   azithromycin (ZITHROMAX) 250 MG tablet Take 2 tabs (500 mg) on Day 1, and take 1 tab (250 mg) on days 2 through 5.  Patient not taking: Reported on 2024 2/15/24   Amelie Guerra APRN - CNP   azithromycin (ZITHROMAX Z-RICKY) 250 MG tablet Take 2 tabs on day 1 followed by 1 tab on days 2-5.  Patient not taking: Reported on 2/15/2024 12/29/23   Amelie Guerra APRN - CNP   ramipril (ALTACE) 1.25 MG capsule TAKE 1 CAPSULE BY MOUTH ONE TIME A DAY 23   Yanique Feldman MD   montelukast (SINGULAIR) 10 MG tablet Take 1 tablet by mouth nightly  Patient taking differently: Take 1 tablet by mouth daily 10/9/23   Shady Cox DO   albuterol sulfate HFA (PROAIR HFA) 108 (90 Base) MCG/ACT inhaler Inhale 2 puffs into the lungs every 6 hours as needed for Wheezing or Shortness of Breath 23   Shady Cox DO   omeprazole (PRILOSEC) 20 MG delayed release capsule TAKE 1 CAPSULE BY MOUTH 2 TIMES A DAY  Patient not taking: Reported on 2024   Yanique Feldman MD

## 2024-05-07 NOTE — ANESTHESIA POSTPROCEDURE EVALUATION
Department of Anesthesiology  Postprocedure Note    Patient: Isai Chapa  MRN: [id number]  YOB: 1960  Date of evaluation: 5/7/2024    Procedure Summary       Date: 05/07/24 Room / Location: [address]    Anesthesia Start: 0749 Anesthesia Stop: 0913    Procedure: LEFT HERNIA INGUINAL REPAIR LAPAROSCOPIC ROBOTIC XI UMBILICAL HERNIA REPAIR (Left: Abdomen) Diagnosis:       Left inguinal hernia      (Left inguinal hernia [K40.90])    Surgeons: Manuel Velazquez MD Responsible Provider: Neeraj Ko MD    Anesthesia Type: General ASA Status: 2            Anesthesia Type: General    Bogdan Phase I: Bogdan Score: 10    Bogdan Phase II: Bogdan Score: 9    Anesthesia Post Evaluation    Cardiovascular status: hemodynamically stable    No notable events documented.

## 2024-05-07 NOTE — DISCHARGE INSTRUCTIONS
Patient Discharge Instructions  Discharge Date:  5/7/2024    Discharged To:  Home    BATHING: May shower/bathe    DRIVING: No driving on narcotics    WALKING:  Yes    STAIRS:  Yes    LIFTING: No strenuous activity or driving. No lifting >10lbs until re-evaluated at follow-up     DIET: common adult regular    SPECIAL INSTRUCTIONS:     May use ice pack for pain/swelling    May use Motrin or Aleve for breakthrough pain    May take Milk of Magnesia as needed for constipation    F/U with Dr. Velazquez in 1-2 weeks at [facility], call for appt [phone]

## 2024-05-07 NOTE — OP NOTE
and the procedure to be performed.  The procedure was begun with a sterile prep and drape.  Local anesthesia was infiltrated into the skin and subcutaneous tissue as a field block.  A transverse supraumbilical incision was made with a scalpel.  Dissection was carried down through the subcutaneous tissue with cautery.  The midline fascia was identified and opened vertically.  Transfascial Vicryl sutures were placed.  The peritoneal cavity was entered bluntly.  A balloontipped trocar was inserted into the peritoneal cavity and pneumoperitoneum was established.  The peritoneal cavity was inspected.  Evidence of a left direct inguinal hernia was appreciated.  There is no evidence of contralateral hernia.  His umbilical hernia containing preperitoneal fat was also noted.  2 additional 8 mm trocars were placed into the peritoneal cavity direct visualization.  This was 1 in the left upper quadrant and 1 in the right upper quadrant.  An additional 8 mm trocar was telescoped through the balloontipped trocar.  The patient was positioned headdown.  All trocars were connected to the [employer] robotic system.  Using a fenestrated bipolar and monopolar scissors, a peritoneal flap was created on the left.  An extraperitoneal plane was dissected.  Hernia Consta reduced.  Cord structures were skeletonized.  Dissection continued into the retropubic space.  The direct inguinal hernia contents were reduced.  An extraperitoneal laparoscopic ProGrip mesh was selected for repair.  This was secured with generous coverage of both the direct and indirect inguinal floor.  The peritoneal flap was then closed with a running V-Loc suture.  Needles and instruments were removed.  The trocars were removed.  The umbilical hernia defect was closed with a figure-of-eight Prolene suture after amputating the preperitoneal fat.  The umbilicus was reattached abdominal wall fascia reconstructing the umbilical stalk.  Once hemostasis was assured, local

## 2024-05-08 ENCOUNTER — CARE COORDINATION (OUTPATIENT)
Dept: OTHER | Facility: CLINIC | Age: 64
End: 2024-05-08

## 2024-05-08 NOTE — CARE COORDINATION
Ambulatory Care Coordination Note    ACM contacted patient for introduction to Associate Care Management related to OP surgery. Verified name and  with patient as identifiers. Patient declines care management at this time, stating No CM needs    Able to reach pt after getting return call. Discussed OP surgery. Pt have left hernia inguinal repair complete 2024. Pt reports lap incision have no s/sx of infection. Pt has follow up with surgery on Monday. Pt report no medication changed. Pt managing mild pain with tylenol. Pt reports he feels confident in post-op education and has no CM needs. Will sign off.     ACM provided contact information for self referral if patient would need care management in the future. ACM will sign off at this time.     No further follow-up call indicated      Future Appointments   Date Time Provider Department Center   2024  1:15 PM Shady Cox, DO [facility]

## 2024-06-14 ENCOUNTER — TRANSCRIBE ORDERS (OUTPATIENT)
Dept: ADMINISTRATIVE | Age: 64
End: 2024-06-14

## 2024-06-14 DIAGNOSIS — J32.8 OTHER CHRONIC SINUSITIS: Primary | ICD-10-CM

## 2024-06-19 ENCOUNTER — HOSPITAL ENCOUNTER (OUTPATIENT)
Dept: CT IMAGING | Facility: CLINIC | Age: 64
Discharge: HOME | End: 2024-06-21
Attending: OTOLARYNGOLOGY
Payer: COMMERCIAL

## 2024-06-19 DIAGNOSIS — J32.8 OTHER CHRONIC SINUSITIS: ICD-10-CM

## 2024-06-19 PROCEDURE — 70486 CT MAXILLOFACIAL W/O DYE: CPT

## 2024-06-28 ENCOUNTER — HOSPITAL ENCOUNTER (OUTPATIENT)
Age: 64
Setting detail: SPECIMEN
Discharge: HOME | End: 2024-06-28

## 2024-06-28 ENCOUNTER — OFFICE VISIT (OUTPATIENT)
Dept: PULMONOLOGY | Age: 64
End: 2024-06-28
Payer: COMMERCIAL

## 2024-06-28 VITALS
DIASTOLIC BLOOD PRESSURE: 81 MMHG | HEART RATE: 50 BPM | HEIGHT: 71 IN | SYSTOLIC BLOOD PRESSURE: 136 MMHG | OXYGEN SATURATION: 98 % | WEIGHT: 181 LBS | RESPIRATION RATE: 16 BRPM | BODY MASS INDEX: 25.34 KG/M2

## 2024-06-28 DIAGNOSIS — J45.51 ASTHMA, SEVERE PERSISTENT, POORLY-CONTROLLED, WITH ACUTE EXACERBATION: ICD-10-CM

## 2024-06-28 DIAGNOSIS — J32.9 CHRONIC SINUSITIS, UNSPECIFIED LOCATION: ICD-10-CM

## 2024-06-28 DIAGNOSIS — J82.83 EOSINOPHILIC ASTHMA: ICD-10-CM

## 2024-06-28 DIAGNOSIS — J82.83 EOSINOPHILIC ASTHMA: Primary | ICD-10-CM

## 2024-06-28 LAB — EOSINOPHIL # BLD: 198 /UL (ref 200–400)

## 2024-06-28 PROCEDURE — 99213 OFFICE O/P EST LOW 20 MIN: CPT | Performed by: INTERNAL MEDICINE

## 2024-06-28 RX ORDER — ALBUTEROL SULFATE 90 UG/1
2 AEROSOL, METERED RESPIRATORY (INHALATION) EVERY 6 HOURS PRN
Qty: 3 EACH | Refills: 3 | Status: SHIPPED | OUTPATIENT
Start: 2024-06-28

## 2024-06-28 RX ORDER — MONTELUKAST SODIUM 10 MG/1
10 TABLET ORAL NIGHTLY
Qty: 90 TABLET | Refills: 3 | Status: SHIPPED | OUTPATIENT
Start: 2024-06-28

## 2024-06-29 ASSESSMENT — ENCOUNTER SYMPTOMS
COUGH: 1
EYES NEGATIVE: 1
WHEEZING: 1
SHORTNESS OF BREATH: 1
SINUS PRESSURE: 1

## 2024-06-29 NOTE — PROGRESS NOTES
Subjective:      Patient ID: Isai Chapa is a 64 y.o. male being seen in my clinic for   Chief Complaint   Patient presents with    Asthma     F/u asthma       HPI  Follow-up visit for asthma.  Since his visit a year ago his asthma has not been under good control.  Apparently in January he began to experience symptoms of acute sinusitis refractory to antibiotics.  He was treated with 2 courses of prednisone which seemed to help some.  In addition to his sinus symptoms, he reported shortness of breath and cough.  Currently on Dulera 200 mcg, montelukast 10 mg, and albuterol.  Uses albuterol prior to running.  Became symptomatic again in April and received more prednisone.  It is difficult to tell whether his symptoms are related to sinus disease, asthma, or likely both.  Definitely has a component of chest tightness cough and shortness of breath.  Previous eosinophil counts have been elevated.  Has multiple environmental allergies    Review of Systems   Constitutional: Negative.    HENT:  Positive for postnasal drip and sinus pressure.    Eyes: Negative.    Respiratory:  Positive for cough, shortness of breath and wheezing.    Cardiovascular: Negative.    All other systems reviewed and are negative.      Objective:     Vitals:    06/28/24 1326   BP: 136/81   Site: Left Upper Arm   Position: Sitting   Pulse: 50   Resp: 16   SpO2: 98%  Comment: ra   Weight: 82.1 kg (181 lb)   Height: 1.803 m (5' 11\")     Current Outpatient Medications   Medication Sig Dispense Refill    albuterol sulfate HFA (PROAIR HFA) 108 (90 Base) MCG/ACT inhaler Inhale 2 puffs into the lungs every 6 hours as needed for Wheezing or Shortness of Breath 3 each 3    mometasone-formoterol (DULERA) 200-5 MCG/ACT inhaler Inhale 2 puffs into the lungs in the morning and 2 puffs in the evening. 3 each 3    montelukast (SINGULAIR) 10 MG tablet Take 1 tablet by mouth nightly 90 tablet 3    tadalafil (CIALIS) 5 MG tablet Take 1 tablet by mouth daily

## 2024-07-05 ENCOUNTER — ENROLLMENT (OUTPATIENT)
Dept: INTERNAL MEDICINE | Age: 64
End: 2024-07-05

## 2024-07-05 ENCOUNTER — TELEPHONE (OUTPATIENT)
Dept: PULMONOLOGY | Age: 64
End: 2024-07-05

## 2024-07-05 NOTE — TELEPHONE ENCOUNTER
Prior auth for Nucala was started with [employer]. Request ID: [id number]. Writer called [employer] to change formulation to auto injector instead of solution. Representative sent an email with the formulation change to prior auth team.

## 2024-07-12 ENCOUNTER — TELEPHONE (OUTPATIENT)
Dept: INTERNAL MEDICINE | Age: 64
End: 2024-07-12

## 2024-07-12 NOTE — TELEPHONE ENCOUNTER
Specialty Medication Service    Date: 7/12/2024  Patient's Name: Isai Chapa YOB: 1960            _____________________________________________________________________________________________    Spoke with patient to schedule PharmD initial appointment for Specialty Medication Services. Patient scheduled 07/15/24 at 8 am.  Most recent office notes from TORIN Cox in patient chart.    Marina Dc CPhT  Pharmacy   Specialty Medication Services   Phone: [phone] option 4    For Pharmacy Admin Tracking Only    Program: [facility]  CPA in place:  No  Recommendation Provided To: Patient/Caregiver: 1 via Telephone  Intervention Detail: Scheduled Appointment  Intervention Accepted By: Patient/Caregiver: 1  Gap Closed?:    Time Spent (min): 15

## 2024-07-15 ENCOUNTER — PHARMACY VISIT (OUTPATIENT)
Dept: INTERNAL MEDICINE | Age: 64
End: 2024-07-15

## 2024-07-15 ENCOUNTER — TELEPHONE (OUTPATIENT)
Dept: PULMONOLOGY | Age: 64
End: 2024-07-15

## 2024-07-15 DIAGNOSIS — J45.51 ASTHMA, SEVERE PERSISTENT, POORLY-CONTROLLED, WITH ACUTE EXACERBATION: Primary | ICD-10-CM

## 2024-07-15 DIAGNOSIS — J82.83 EOSINOPHILIC ASTHMA: ICD-10-CM

## 2024-07-15 ASSESSMENT — PROMIS GLOBAL HEALTH SCALE
IN GENERAL, HOW WOULD YOU RATE YOUR PHYSICAL HEALTH [ON A SCALE OF 1 (POOR) TO 5 (EXCELLENT)]?: VERY GOOD
IN GENERAL, HOW WOULD YOU RATE YOUR SATISFACTION WITH YOUR SOCIAL ACTIVITIES AND RELATIONSHIPS [ON A SCALE OF 1 (POOR) TO 5 (EXCELLENT)]?: EXCELLENT
TO WHAT EXTENT ARE YOU ABLE TO CARRY OUT YOUR EVERYDAY PHYSICAL ACTIVITIES SUCH AS WALKING, CLIMBING STAIRS, CARRYING GROCERIES, OR MOVING A CHAIR [ON A SCALE OF 1 (NOT AT ALL) TO 5 (COMPLETELY)]?: COMPLETELY
IN THE PAST 7 DAYS, HOW WOULD YOU RATE YOUR PAIN ON AVERAGE [ON A SCALE FROM 0 (NO PAIN) TO 10 (WORST IMAGINABLE PAIN)]?: 3
SUM OF RESPONSES TO QUESTIONS 3, 6, 7, & 8: 15
IN GENERAL, WOULD YOU SAY YOUR HEALTH IS...[ON A SCALE OF 1 (POOR) TO 5 (EXCELLENT)]: VERY GOOD
IN GENERAL, PLEASE RATE HOW WELL YOU CARRY OUT YOUR USUAL SOCIAL ACTIVITIES (INCLUDES ACTIVITIES AT HOME, AT WORK, AND IN YOUR COMMUNITY, AND RESPONSIBILITIES AS A PARENT, CHILD, SPOUSE, EMPLOYEE, FRIEND, ETC) [ON A SCALE OF 1 (POOR) TO 5 (EXCELLENT)]?: EXCELLENT
IN GENERAL, WOULD YOU SAY YOUR QUALITY OF LIFE IS...[ON A SCALE OF 1 (POOR) TO 5 (EXCELLENT)]: VERY GOOD
HOW IS THE PROMIS V1.1 BEING ADMINISTERED?: TELEPHONE
IN THE PAST 7 DAYS, HOW WOULD YOU RATE YOUR FATIGUE ON AVERAGE [ON A SCALE FROM 1 (NONE) TO 5 (VERY SEVERE)]?: MODERATE
IN THE PAST 7 DAYS, HOW OFTEN HAVE YOU BEEN BOTHERED BY EMOTIONAL PROBLEMS, SUCH AS FEELING ANXIOUS, DEPRESSED, OR IRRITABLE [ON A SCALE FROM 1 (NEVER) TO 5 (ALWAYS)]?: NEVER
IN GENERAL, HOW WOULD YOU RATE YOUR MENTAL HEALTH, INCLUDING YOUR MOOD AND YOUR ABILITY TO THINK [ON A SCALE OF 1 (POOR) TO 5 (EXCELLENT)]?: VERY GOOD
WHO IS THE PERSON COMPLETING THE PROMIS V1.1 SURVEY?: SELF
SUM OF RESPONSES TO QUESTIONS 2, 4, 5, & 10: 18

## 2024-07-15 ASSESSMENT — PATIENT HEALTH QUESTIONNAIRE - PHQ9
SUM OF ALL RESPONSES TO PHQ QUESTIONS 1-9: 0
1. LITTLE INTEREST OR PLEASURE IN DOING THINGS: NOT AT ALL
SUM OF ALL RESPONSES TO PHQ9 QUESTIONS 1 & 2: 0
2. FEELING DOWN, DEPRESSED OR HOPELESS: NOT AT ALL

## 2024-07-15 NOTE — PROGRESS NOTES
Specialty Medication Service    Patient's Name: Isai Chapa YOB: 1960      Isai Chapa is a 64 y.o. male presenting today for Specialty Medication Service visit. Patient is prescribed [facility] formulary medication, Nucala. Medication list updated.    Specialty Medication: Nucala 100 mg/ml SOAJ  Frequency: Every 4 weeks  Indication: Eosinophilic Asthma  Initially Diagnosed: early childhood  Additional Therapy:   Albuterol  Dulera  Singulair  Previous Therapy:   Qvar  Advair  Asmanex  Anoro Ellipta    Specialist:   Shady Cox  [address] [place] [address]  [phone]    Specialist Progress Note Available: Yes  Last Specialist Visit: 2024 - starting Nucala     Allergies   Allergen Reactions    Ciprofloxacin Dermatitis     Other Reaction(s): Unknown       Past Medical History:   Diagnosis Date    Allergic rhinitis     Arthritis     in hip    Asthma     GERD (gastroesophageal reflux disease)     Hypertension     Migraines     Osteoarthritis       Social History     Tobacco Use    Smoking status: Former     Types: Cigars     Quit date:      Years since quittin.5    Smokeless tobacco: Never    Tobacco comments:     patient states 4 cigars a year    Substance Use Topics    Alcohol use: Yes     Comment: Maybe 2-3 alcoholic beverages per month     Family History   Problem Relation Age of Onset    No Known Problems Mother     Diabetes Father        REVIEW OF CURRENT DISEASE STATE  Asthma: Patient presents for continued evaluation of specialty medication for diagnosis of asthma. Patient historical symptoms include non-productive cough. Currently symptoms include non-productive cough and occur daily.  Observed precipitants include cold air and exercise. Patient reports using rescue inhaler  prior to running. Current limitations in activity from asthma: none.      Does he do nebulizer treatments? no  Does he use an inhaler? yes  Does he use a spacer w/MDIs? no    ·

## 2024-07-15 NOTE — TELEPHONE ENCOUNTER
Specialty Medication Service    Date: 7/15/2024  Patient's Name: Isai Chapa YOB: 1960            _____________________________________________________________________________________________    Patient seen for [facility] SMS program for Nucala. Patient does not have prescription at [facility]. New prescription sent to pulmonary office for approval.    Terry Davis, ShraddhaD, [facility]  Ambulatory Clinical Pharmacist   [facility] Special[facility]  Phone: [phone]  [facility]  Phone: [phone] option 1

## 2024-07-16 RX ORDER — MEPOLIZUMAB 100 MG/ML
100 INJECTION, SOLUTION SUBCUTANEOUS
Qty: 1 ML | Refills: 6 | Status: SHIPPED | OUTPATIENT
Start: 2024-07-16

## 2024-07-16 NOTE — TELEPHONE ENCOUNTER
Specialty Medication Service    Date: 7/16/2024  Patient's Name: Isai Chapa YOB: 1960            _____________________________________________________________________________________________    Spoke with Salome GOMEZ from pulmonary office. Clarification received for Nucala prescription. Prescription signed from verbal readback for Dr. Cox for autoinjector versus vials. Prescription sent to [facility].    Terry Davis, PharmD, [facility]  Ambulatory Clinical Pharmacist   [facility] Specialty Medication Service  Phone: [phone]  [facility]  Phone: [phone] option 1    For Pharmacy Admin Tracking Only    Program: SMS  CPA in place:  No  Recommendation Provided To: Provider: 1 via Verbally to provider  Intervention Detail: New Rx: 1, reason: Patient Preference  Intervention Accepted By: Provider: 1  Gap Closed?: Yes   Time Spent (min): 20

## 2024-08-01 RX ORDER — METHYLPREDNISOLONE 4 MG/1
TABLET ORAL
Qty: 1 KIT | Refills: 0 | Status: SHIPPED | OUTPATIENT
Start: 2024-08-01 | End: 2024-08-07

## 2024-08-06 DIAGNOSIS — M79.18 GLUTEAL PAIN: Primary | ICD-10-CM

## 2024-08-13 ENCOUNTER — HOSPITAL ENCOUNTER (OUTPATIENT)
Dept: PHYSICAL THERAPY | Facility: CLINIC | Age: 64
Setting detail: THERAPIES SERIES
Discharge: HOME | End: 2024-08-13
Payer: COMMERCIAL

## 2024-08-13 PROCEDURE — 97110 THERAPEUTIC EXERCISES: CPT

## 2024-08-13 PROCEDURE — 97140 MANUAL THERAPY 1/> REGIONS: CPT

## 2024-08-13 PROCEDURE — 97162 PT EVAL MOD COMPLEX 30 MIN: CPT

## 2024-08-13 NOTE — CONSULTS
Running  Sleep: [] OK    [x] Disturbed    Objective:    ROM  ° A/P STRENGTH TESTS (+/-) Left Right Not Tested    Left Right Left Right Ant. Drawer   [x]   Hip Flex 110 110 5 5 Post. Drawer   [x]   Ext 20 20 4 pn 5 Lachmans   [x]   ER     Valgus Stress   [x]   IR     Varus Stress   [x]   ABD   4+ pn 5 Ricky’s   [x]   ADD     Apley’s Comp.   [x]   Knee Flex   5 5 Apley’s Dist.   [x]   Ext   5 5 Hip Scouring - - []   Ankle DF     FAIZA’s - - []   PF     Piriformis - - []   INV     Wiley’s   [x]   EVER     Talor Tilt   [x]   Lumbar spine AROM 100%     Pat-Fem Grind   [x]       OBSERVATION No Deficit Deficit Not Tested Comments   Posture       Forward Head [] [x] []    Rounded Shoulders [] [x] []    Kyphosis [] [x] []    Lordosis [] [] [x]    Lateral Shift [] [] [x]    Scoliosis [] [] [x]    Iliac Crest [] [] [x]    PSIS [] [] [x]    ASIS [] [] [x]    Genu Valgus [x] [] []    Genu Varus [x] [] []    Genu Recurvatum [x] [] []    Pronation [] [] [x]    Supination [] [] [x]    Leg Length Discrp [] [] [x]    Slumped Sitting [] [] [x]    Palpation [] [x] [] TTP: Left gluteus medius   Sensation [x] [] []    Edema [x] [] []    Neurological [x] [] []    Patellar Mobility [x] [] []    Patellar Orientation [x] [] []    Gait [x] [] [] Analysis: Pain with running         FUNCTION Normal Difficult Unable   Sitting [x] [] []   Standing [] [x] []   Ambulation [] [x] []   Groom/Dress [x] [] []   Lift/Carry [] [x] []   Stairs [] [x] []   Bending [x] [] []   Squat [x] [] []   Kneel [x] [] []     BALANCE/PROPRIOCEPTION              [] Not tested   Single leg stance       R                     L                                PAIN   Eyes open                 >30       Sec.     >30     Sec               .[]    Eyes closed                          Sec.                  Sec                  .[]        FUNCTIONAL TESTS PAIN NO PAIN COMMENTS   Step Test 4” [] [x]    6” [x] []    8” [x] []    Squat [] [x]      Functional Test: UWRI Score: 86%

## 2024-08-15 ENCOUNTER — HOSPITAL ENCOUNTER (OUTPATIENT)
Dept: PHYSICAL THERAPY | Facility: CLINIC | Age: 64
Setting detail: THERAPIES SERIES
Discharge: HOME | End: 2024-08-15
Payer: COMMERCIAL

## 2024-08-15 ENCOUNTER — OFFICE VISIT (OUTPATIENT)
Dept: NEUROSURGERY | Age: 64
End: 2024-08-15
Payer: COMMERCIAL

## 2024-08-15 VITALS
DIASTOLIC BLOOD PRESSURE: 97 MMHG | HEART RATE: 58 BPM | OXYGEN SATURATION: 98 % | HEIGHT: 71 IN | WEIGHT: 179 LBS | BODY MASS INDEX: 25.06 KG/M2 | SYSTOLIC BLOOD PRESSURE: 165 MMHG

## 2024-08-15 DIAGNOSIS — M47.816 LUMBAR SPONDYLOSIS: Primary | ICD-10-CM

## 2024-08-15 DIAGNOSIS — M25.551 RIGHT HIP PAIN: ICD-10-CM

## 2024-08-15 PROCEDURE — 97110 THERAPEUTIC EXERCISES: CPT

## 2024-08-15 PROCEDURE — 97140 MANUAL THERAPY 1/> REGIONS: CPT

## 2024-08-15 PROCEDURE — 99214 OFFICE O/P EST MOD 30 MIN: CPT | Performed by: NURSE PRACTITIONER

## 2024-08-15 NOTE — FLOWSHEET NOTE
Action:  Comments: Patent reports that he saw his Yasmin Barnes issued a referral for his lumbar spine in conjunction with his hip pain. His left hip pain is unchanged since initial visit.       Objective:  Modalities:   Precautions: Chronic back pain  Exercises:  Exercise Reps/ Time Weight/ Level Comments   LTR 2x10     Brace  2x5     Brace march 10     Iso hip flexor 2x10     Bridge 2x10       Bridge march 10 ea     Clam 15       Squat  2x10       Split squat         Lateral band walk  2x15  orange  increased pain second set   Other:  Manual:  Side lying DI and MFR to hip abductors  IASTM with hyper-volt to hip abductors     Patient was informed of the potential benefits of Dry Needling. Patient was informed of risks including but not limited to drowsiness, dizziness, minor bruising or bleeding, temporary pain, tingling, numbness and a low risk of pneumothorax.  Patient was informed that Dry Needling is not currently covered by insurance and that the patient would be responsible for the cost of future treatments.  The patient verbalized understanding and signed a Dry Needling Acknowledgement form.    A Dry Needling assessment was performed. The following trigger point were identified: left gluteus medius, left piriformis          Treatment Charges: Mins Units   []  Modalities     [x]  Ther Exercise 15 1   [x]  Manual Therapy 25 2   []  Ther Activities     []  Neuro Re-ed     []  Vasocompression     [] Gait     []  Other     Total Billable time 40 3       Assessment: [x] Progressing toward goals. Patient has continued pain in hip that is reproduced with single leg stance and hip abduction exercises. He has no pain with lumbar AROM.  Began with extensive MT with trigger points identified in piriformis and gluteus medius.  He has increased pain with isolated abduction and weight bearing. Will progress hip and core strength as tolerated and address weight bearing.      [] No change.     [] Other:  [x] Patient would

## 2024-08-15 NOTE — PROGRESS NOTES
[facility] NEUROSURGERY  [address]  [address]  [place] [id number]  Dept: [phone]    Patient:  Isai Chapa  YOB: 1960  Date: 8/15/24    The patient is a 64 y.o. male who presents today for consult of the following problems:     Chief Complaint   Patient presents with    Follow-up     Just was eval to start pt          HPI:     Isai Chapa is a 64 y.o. male who presents for follow up of back pain. Having pain to midline lower back that radiates to the right. Pain on average is 3/10, described as a sharp. Pain is worsened with increased exercise, especially running, which he really enjoys. Pain is typically alleviated with the use of aleve, takes a few times a week. Denies any radiation to lower extremities.  Pain does interfere with his ability to enjoy exercising, activities.  Has followed with pain management, did undergo multiple interventions including basivertebral nerve ablation of L4/L5/S1, right SIJ injection, right medial branch block L3, L4-L5 and sacral ala. Basivertebral ablation provided the most relief, but only for approximately 4 months, felt that symptoms subsequently returned.  Additional interventions did not prove fruitful.     Recently initiated physical therapy for right hip/gluteus pain. Denies any associated saddle anesthesia, loss of bowel or bladder function.    History:     Past Medical History:   Diagnosis Date    Allergic rhinitis     Arthritis     in hip    Asthma     GERD (gastroesophageal reflux disease)     Hypertension     Migraines     Osteoarthritis      Past Surgical History:   Procedure Laterality Date    COLONOSCOPY  04/06/2018    polyp removed    COLONOSCOPY N/A 04/06/2018    COLONOSCOPY POLYPECTOMY SNARE/COLD BIOPSY performed by Mina Atkins MD at [facility] OR    EYE SURGERY Bilateral     rk bilateral eyes    HERNIA REPAIR Left 5/7/2024    LEFT HERNIA INGUINAL

## 2024-08-21 ENCOUNTER — HOSPITAL ENCOUNTER (OUTPATIENT)
Dept: PHYSICAL THERAPY | Facility: CLINIC | Age: 64
Setting detail: THERAPIES SERIES
Discharge: HOME | End: 2024-08-21
Payer: COMMERCIAL

## 2024-08-21 PROCEDURE — 97140 MANUAL THERAPY 1/> REGIONS: CPT

## 2024-08-21 PROCEDURE — 97110 THERAPEUTIC EXERCISES: CPT

## 2024-08-21 NOTE — FLOWSHEET NOTE
Action:  Comments: Patent reports that his hip is doing a little better.  He is able to run with reduced pain during and after compared to prior attempts.  He has only been able to run 1.5 miles at the longest.     Objective:  Modalities:   Precautions: Chronic back pain  Exercises:  Exercise Reps/ Time Weight/ Level Comments   LTR 2x10     Brace  2x5     Brace march 10     Brace bicycle 2x10     Bridge 2x10       Bridge march 10 ea     Clam 15       Squat  2x10       Split squat 2x10       3 way kick 2x5      Lateral band walk  2x15  orange  increased pain second set   Other:  Manual:  Side lying DI and MFR to hip abductors  IASTM with hyper-volt to hip abductors   Trigger point treatment to left gluteus medius, left piriformis        Treatment Charges: Mins Units   []  Modalities     [x]  Ther Exercise 30 2   [x]  Manual Therapy 25 2   []  Ther Activities     []  Neuro Re-ed     []  Vasocompression     [] Gait     []  Other     Total Billable time 45 3       Assessment: [x] Progressing toward goals. Patient has continued pain in hip that is reproduced with single leg stance and hip abduction exercises. He has been able to run with less pain.  Began with MT with reduced tension reports.  Progressed through core and standing functional strength.  Following exercise patient able to jog with reduced complaints of pain.      [] No change.     [] Other:  [x] Patient would continue to benefit from skilled physical therapy services in order to: The patient is a 64 year old male with a chief complaint of left hip pain and signs and symproms consistent with a diagnosis of gluteus medius dysfunction.  He present with pain, back pain, hip weakness, tenderness and functional limitations.  He will benefit from skilled physical therapy to address above limitations.     STG/LTG  STG: (to be met in 5 treatments)  ? Pain: <2/10  ? Strength: 5/5  ? Function: Patient will resume running  Patient to be independent with home exercise

## 2024-08-26 ENCOUNTER — HOSPITAL ENCOUNTER (OUTPATIENT)
Dept: PHYSICAL THERAPY | Facility: CLINIC | Age: 64
Setting detail: THERAPIES SERIES
Discharge: HOME | End: 2024-08-26
Payer: COMMERCIAL

## 2024-08-26 PROCEDURE — 97110 THERAPEUTIC EXERCISES: CPT

## 2024-08-26 PROCEDURE — 97140 MANUAL THERAPY 1/> REGIONS: CPT

## 2024-08-29 ENCOUNTER — HOSPITAL ENCOUNTER (OUTPATIENT)
Dept: GENERAL RADIOLOGY | Age: 64
Discharge: HOME | End: 2024-08-31
Payer: COMMERCIAL

## 2024-08-29 ENCOUNTER — HOSPITAL ENCOUNTER (OUTPATIENT)
Age: 64
Discharge: HOME | End: 2024-08-31
Payer: COMMERCIAL

## 2024-08-29 ENCOUNTER — PHARMACY VISIT (OUTPATIENT)
Dept: INTERNAL MEDICINE | Age: 64
End: 2024-08-29

## 2024-08-29 DIAGNOSIS — M47.816 LUMBAR SPONDYLOSIS: ICD-10-CM

## 2024-08-29 DIAGNOSIS — J45.51 ASTHMA, SEVERE PERSISTENT, POORLY-CONTROLLED, WITH ACUTE EXACERBATION: ICD-10-CM

## 2024-08-29 DIAGNOSIS — J82.83 EOSINOPHILIC ASTHMA: ICD-10-CM

## 2024-08-29 PROCEDURE — 1111F DSCHRG MED/CURRENT MED MERGE: CPT | Performed by: INTERNAL MEDICINE

## 2024-08-29 PROCEDURE — 72110 X-RAY EXAM L-2 SPINE 4/>VWS: CPT

## 2024-08-29 RX ORDER — MEPOLIZUMAB 100 MG/ML
100 INJECTION, SOLUTION SUBCUTANEOUS
Qty: 1 ML | Refills: 6 | Status: SHIPPED | OUTPATIENT
Start: 2024-08-29

## 2024-08-29 NOTE — PROGRESS NOTES
Arthritis of right hip    Chronic midline low back pain without sciatica    Tendonitis of knee, right    Acute pain of right knee    DDD (degenerative disc disease), lumbar    Vertebrogenic pain    Chronic SI joint pain    Lumbosacral spondylosis without myelopathy    Asthma, severe persistent, poorly-controlled, with acute exacerbation       Health Maintenance Due   Topic Date Due    HIV screen  Never done    Hepatitis C screen  Never done    Pneumococcal 0-64 years Vaccine (2 of 2 - PCV) 01/06/2021    Respiratory Syncytial Virus (RSV) Pregnant or age 60 yrs+ (1 - 1-dose 60+ series) Never done    COVID-19 Vaccine (4 - 2023-24 season) 09/01/2023    DTaP/Tdap/Td vaccine (3 - Td or Tdap) 04/30/2024    Flu vaccine (1) 08/01/2024       Allergies   Allergen Reactions    Ciprofloxacin Dermatitis     Other Reaction(s): Unknown         Current Outpatient Medications   Medication Sig Dispense Refill    mepolizumab (NUCALA) 100 MG/ML SOAJ injection Inject 1 mL into the skin every 28 days 1 mL 6    albuterol sulfate HFA (PROAIR HFA) 108 (90 Base) MCG/ACT inhaler Inhale 2 puffs into the lungs every 6 hours as needed for Wheezing or Shortness of Breath 3 each 3    mometasone-formoterol (DULERA) 200-5 MCG/ACT inhaler Inhale 2 puffs into the lungs in the morning and 2 puffs in the evening. 3 each 3    montelukast (SINGULAIR) 10 MG tablet Take 1 tablet by mouth nightly 90 tablet 3    tadalafil (CIALIS) 5 MG tablet Take 1 tablet by mouth daily (Patient taking differently: Take 1 tablet by mouth daily Patient takes on Tuesday, Thursday, Saturday and Sunday) 90 tablet 3    SUMAtriptan (IMITREX) 100 MG tablet Take 1 tablet by mouth daily as needed for Migraine 18 tablet 0    ramipril (ALTACE) 1.25 MG capsule TAKE 1 CAPSULE BY MOUTH ONE TIME A DAY 90 capsule 3    omeprazole (PRILOSEC OTC) 20 MG tablet 1 tablet daily OTC       No current facility-administered medications for this visit.       Social History     Tobacco Use    Smoking  status: Former     Types: Cigars     Quit date:      Years since quittin.6    Smokeless tobacco: Never    Tobacco comments:     patient states 4 cigars a year    Vaping Use    Vaping status: Never Used   Substance Use Topics    Alcohol use: Yes     Comment: Maybe 2-3 alcoholic beverages per month    Drug use: No       Family History   Problem Relation Age of Onset    No Known Problems Mother     Diabetes Father         REVIEW OF SYSTEMS:  Review of Systems    PHYSICAL EXAM:  There were no vitals filed for this visit.  BP Readings from Last 3 Encounters:   08/15/24 (!) 165/97   24 136/81   24 (!) 145/87          ASSESSMENT AND PLAN:  Isai \"Angelo\" was seen today for medication management.    Diagnoses and all orders for this visit:    Asthma, severe persistent, poorly-controlled, with acute exacerbation  -     mepolizumab (NUCALA) 100 MG/ML SOAJ injection; Inject 1 mL into the skin every 28 days  -     [facility] Medication Service    Eosinophilic asthma  -     mepolizumab (NUCALA) 100 MG/ML SOAJ injection; Inject 1 mL into the skin every 28 days  -     [facility] Medication Service         Given the patient's condition, the patient should continue with the current care provided by the pharmacist.    FOLLOW UP AND INSTRUCTIONS:  Follow up with 12 months    Isai received counseling on the following healthy behaviors: nutrition, exercise, and medication adherence    Discussed use, benefit, and side effects of prescribed medications.  Barriers to medication compliance addressed.  All patient questions answered.  Pt voiced understanding.      Margot Garcia MD  Director [facility] pharmacy program  Faculty Internal Medicine    [facility]  [facility]    2024, 9:39 AM

## 2024-09-03 ENCOUNTER — HOSPITAL ENCOUNTER (OUTPATIENT)
Dept: PHYSICAL THERAPY | Facility: CLINIC | Age: 64
Setting detail: THERAPIES SERIES
Discharge: HOME | End: 2024-09-03
Payer: COMMERCIAL

## 2024-09-03 PROCEDURE — 97140 MANUAL THERAPY 1/> REGIONS: CPT

## 2024-09-03 PROCEDURE — 97110 THERAPEUTIC EXERCISES: CPT

## 2024-09-03 NOTE — FLOWSHEET NOTE
in 5 treatments)  ? Pain: <2/10  ? Strength: 5/5  ? Function: Patient will resume running  Patient to be independent with home exercise program as demonstrated by performance with correct form without cues.     LTG: (to be met in 10 treatments)  The patient will demonstrate pain free normalized running gait pattern.  The patient will ascend and descend stairs step over step without pain or use of a rail.  Patient will resume training for his fall 1/2 marathon schedule     Pt. Education:  [x] Yes  [] No. Hip anatomy  [] Reviewed Prior HEP/Ed  Method of Education: [x] Verbal  [] Demo  [] Written  Comprehension of Education:  [] Verbalizes understanding.  [x] Demonstrates understanding.  [] Needs review.  [] Demonstrates/verbalizes HEP/Ed previously given.     Plan: [x] Continue current frequency toward long and short term goals.    [x] Specific Instructions for subsequent treatments:  MT PRN, progressive hip strength, return to run program         Time In:11:00 AM            Time Out: 11:55 AM    Electronically signed by:  José Nunez, PT

## 2024-09-05 ENCOUNTER — HOSPITAL ENCOUNTER (OUTPATIENT)
Dept: PHYSICAL THERAPY | Facility: CLINIC | Age: 64
Setting detail: THERAPIES SERIES
Discharge: HOME | End: 2024-09-05
Payer: COMMERCIAL

## 2024-09-05 PROCEDURE — 97110 THERAPEUTIC EXERCISES: CPT

## 2024-09-05 PROCEDURE — 97016 VASOPNEUMATIC DEVICE THERAPY: CPT

## 2024-09-05 PROCEDURE — 97140 MANUAL THERAPY 1/> REGIONS: CPT

## 2024-09-05 PROCEDURE — 97162 PT EVAL MOD COMPLEX 30 MIN: CPT

## 2024-09-05 NOTE — FLOWSHEET NOTE
[] [facility]  Outpatient Rehabilitation &  Therapy  [address]  [phone]  [phone] [] [facility]  Outpatient Rehabilitation &  Therapy  [address]  P: [phone]  F: [phone] [] [facility]  Outpatient Rehabilitation &  Therapy  [address]  P: [phone]  F: [phone] [x] [facility]  Outpatient Rehabilitation &  Therapy  [address]  [phone]  [phone] [] [facility]  Outpatient Rehabilitation &  Therapy  [address]   P: [phone]  F: [phone]  [] [facility]  Outpatient Rehabilitation &  Therapy  [address]  P: [phone]  F: [phone] [] [facility]  Outpatient Rehabilitation &  Therapy  [address]  P: [phone]  F: [phone] [] [facility]  Outpatient Rehabilitation &  Therapy  [address]  P: [phone]  F: [phone] [] [facility]  Outpatient Rehabilitation &  Therapy  [address]  P: [phone]  F: [phone]  [] [facility] Outpatient Rehabilitation &  Therapy  [address]  P: [phone]  F: [phone]     Physical Therapy Daily Treatment Note    Date:  2024  Patient Name:  Isai Chapa    :  1960  MRN: [id number]  Physician:      Yanique Feldman MD   Insurance: [facility]; Reynaldo yr; 30/30vs; auth after 30vs; no pt resp-OOP met for    Medical Diagnosis: M79.18 (ICD-10-CM) - Gluteal pain, M47.816 (ICD-10-CM) - Lumbar spondylosis   Rehab Codes: M79.18 (ICD-10-CM) - Gluteal pain, M54.5  Onset date: 24               Next Dr's appt.: Pending  Visit# / total visits: 6/10     Cancels/No Shows: 0    Subjective:    Pain:  [x] Yes  [] No Location: Left gluteal Pain Rating: (0-10 scale) 3/10  Pain altered Tx:  [] No  [] Yes

## 2024-09-09 ENCOUNTER — APPOINTMENT (OUTPATIENT)
Dept: PHYSICAL THERAPY | Facility: CLINIC | Age: 64
End: 2024-09-09
Payer: COMMERCIAL

## 2024-09-10 ENCOUNTER — HOSPITAL ENCOUNTER (OUTPATIENT)
Dept: PHYSICAL THERAPY | Facility: CLINIC | Age: 64
Setting detail: THERAPIES SERIES
Discharge: HOME | End: 2024-09-10
Payer: COMMERCIAL

## 2024-09-10 PROCEDURE — 97110 THERAPEUTIC EXERCISES: CPT

## 2024-09-10 PROCEDURE — 97140 MANUAL THERAPY 1/> REGIONS: CPT

## 2024-09-12 ENCOUNTER — HOSPITAL ENCOUNTER (OUTPATIENT)
Dept: PHYSICAL THERAPY | Facility: CLINIC | Age: 64
Setting detail: THERAPIES SERIES
Discharge: HOME | End: 2024-09-12
Payer: COMMERCIAL

## 2024-09-12 PROCEDURE — 97140 MANUAL THERAPY 1/> REGIONS: CPT

## 2024-09-12 PROCEDURE — 97110 THERAPEUTIC EXERCISES: CPT

## 2024-09-16 ENCOUNTER — HOSPITAL ENCOUNTER (OUTPATIENT)
Dept: PHYSICAL THERAPY | Facility: CLINIC | Age: 64
Setting detail: THERAPIES SERIES
Discharge: HOME | End: 2024-09-16
Payer: COMMERCIAL

## 2024-09-16 PROCEDURE — 97140 MANUAL THERAPY 1/> REGIONS: CPT

## 2024-09-16 PROCEDURE — 97110 THERAPEUTIC EXERCISES: CPT

## 2024-09-24 ENCOUNTER — HOSPITAL ENCOUNTER (OUTPATIENT)
Dept: PHYSICAL THERAPY | Facility: CLINIC | Age: 64
Setting detail: THERAPIES SERIES
Discharge: HOME | End: 2024-09-24
Payer: COMMERCIAL

## 2024-09-24 ENCOUNTER — TELEPHONE (OUTPATIENT)
Dept: ORTHOPEDIC SURGERY | Age: 64
End: 2024-09-24

## 2024-09-24 PROCEDURE — 97140 MANUAL THERAPY 1/> REGIONS: CPT

## 2024-09-24 PROCEDURE — 97110 THERAPEUTIC EXERCISES: CPT

## 2024-09-24 SDOH — HEALTH STABILITY: PHYSICAL HEALTH: ON AVERAGE, HOW MANY MINUTES DO YOU ENGAGE IN EXERCISE AT THIS LEVEL?: 120 MIN

## 2024-09-24 SDOH — HEALTH STABILITY: PHYSICAL HEALTH: ON AVERAGE, HOW MANY DAYS PER WEEK DO YOU ENGAGE IN MODERATE TO STRENUOUS EXERCISE (LIKE A BRISK WALK)?: 4 DAYS

## 2024-09-25 ENCOUNTER — OFFICE VISIT (OUTPATIENT)
Dept: ORTHOPEDIC SURGERY | Age: 64
End: 2024-09-25
Payer: COMMERCIAL

## 2024-09-25 VITALS — BODY MASS INDEX: 25.4 KG/M2 | WEIGHT: 181.4 LBS | RESPIRATION RATE: 14 BRPM | HEIGHT: 71 IN

## 2024-09-25 DIAGNOSIS — M67.952 TENDINOPATHY OF LEFT GLUTEAL REGION: ICD-10-CM

## 2024-09-25 DIAGNOSIS — M25.552 LEFT HIP PAIN: Primary | ICD-10-CM

## 2024-09-25 PROCEDURE — 99204 OFFICE O/P NEW MOD 45 MIN: CPT | Performed by: PHYSICIAN ASSISTANT

## 2024-09-27 ENCOUNTER — OFFICE VISIT (OUTPATIENT)
Dept: NEUROSURGERY | Age: 64
End: 2024-09-27
Payer: COMMERCIAL

## 2024-09-27 VITALS
SYSTOLIC BLOOD PRESSURE: 148 MMHG | BODY MASS INDEX: 25.94 KG/M2 | HEART RATE: 67 BPM | HEIGHT: 70 IN | WEIGHT: 181.2 LBS | DIASTOLIC BLOOD PRESSURE: 83 MMHG

## 2024-09-27 DIAGNOSIS — M47.816 LUMBAR FACET ARTHROPATHY: ICD-10-CM

## 2024-09-27 DIAGNOSIS — M47.816 LUMBAR SPONDYLOSIS: Primary | ICD-10-CM

## 2024-09-27 PROCEDURE — 99213 OFFICE O/P EST LOW 20 MIN: CPT | Performed by: NURSE PRACTITIONER

## 2024-10-01 ENCOUNTER — HOSPITAL ENCOUNTER (OUTPATIENT)
Dept: PHYSICAL THERAPY | Facility: CLINIC | Age: 64
Setting detail: THERAPIES SERIES
Discharge: HOME | End: 2024-10-01
Payer: COMMERCIAL

## 2024-10-01 PROCEDURE — 97110 THERAPEUTIC EXERCISES: CPT

## 2024-10-01 PROCEDURE — 97140 MANUAL THERAPY 1/> REGIONS: CPT

## 2024-10-01 NOTE — FLOWSHEET NOTE
Action:  Comments: Patent reports that he ran one mile last Wednesday with minimal pain. He did 3 limes later in the week and 3 miles again yesterday with some left lateral hip pain.  He is scheduled to have an MRI next week on his hip and back.        Objective:  Modalities:   Precautions: Chronic back pain  Exercises:  Exercise Reps/ Time  Weight/ Level Comments   LTR 2x10 x     Brace  2x5      Brace march x15 x     Brace bicycle x15 x     Curl up 2x10 x     Bridge 2x10 x       Bridge march       Sl bridge 2x10 x     Clam 15 x       Abduction 10 x     Hip Circles 10/10 x     1/2 plank abduction 10 ea x     Agility Ladder       2 step 4x      1 step  4x      Lateral step 2x      Dynamic Moblity       Walking knee hug 25' x     Walking hip cradle 25' x     Quad hip stretch 25' x     BOSU Squat  2x12 x       Split squat 2x10        SL RDL -      3 way kick 3x8 x      Lateral band walk  2x15 x  lime    Treadmill walk/Run 1:1x3  4.5mph Metronome at 168bpm   Other:  Manual:  Side lying DI and MFR to hip abductors  IASTM with hyper-volt to hip abductors   Trigger point treatment to gluteus medius and piriformis        Treatment Charges: Mins Units   []  Modalities     [x]  Ther Exercise 30 2   [x]  Manual Therapy 15 1   []  Ther Activities     []  Neuro Re-ed     []  Vasocompression     [] Gait     []  Other     Total Billable time 45 3       Assessment: [x] Progressing toward goals. Began with manual therapy with continued tenderness to gluteus medius.  Side lying and supine hip and core strength performed. Progressed to dynamic mobility and funcitonal strength with good tolerance.       [] No change.     [] Other:  [x] Patient would continue to benefit from skilled physical therapy services in order to: The patient is a 64 year old male with a chief complaint of left hip pain and signs and symproms consistent with a diagnosis of gluteus medius dysfunction.  He present with pain, back pain, hip weakness, tenderness and

## 2024-10-03 ENCOUNTER — HOSPITAL ENCOUNTER (OUTPATIENT)
Dept: PHYSICAL THERAPY | Facility: CLINIC | Age: 64
Setting detail: THERAPIES SERIES
Discharge: HOME | End: 2024-10-03
Payer: COMMERCIAL

## 2024-10-03 PROCEDURE — 97110 THERAPEUTIC EXERCISES: CPT

## 2024-10-03 PROCEDURE — 97140 MANUAL THERAPY 1/> REGIONS: CPT

## 2024-10-03 NOTE — FLOWSHEET NOTE
[] [facility]  Outpatient Rehabilitation &  Therapy  [address]  [phone]  [phone] [] [facility]  Outpatient Rehabilitation &  Therapy  [address]  P: [phone]  F: [phone] [] [facility]  Outpatient Rehabilitation &  Therapy  [address]  P: [phone]  F: [phone] [x] [facility]  Outpatient Rehabilitation &  Therapy  [address]  [phone]  [phone] [] [facility]  Outpatient Rehabilitation &  Therapy  [address]   P: [phone]  F: [phone]  [] [facility]  Outpatient Rehabilitation &  Therapy  [address]  P: [phone]  F: [phone] [] [facility]  Outpatient Rehabilitation &  Therapy  [address]  P: [phone]  F: [phone] [] [facility]  Outpatient Rehabilitation &  Therapy  [address]  P: [phone]  F: [phone] [] [facility]  Outpatient Rehabilitation &  Therapy  [address]  P: [phone]  F: [phone]  [] [facility] Outpatient Rehabilitation &  Therapy  [address]  P: [phone]  F: [phone]     Physical Therapy Daily Treatment Note    Date:  10/3/2024  Patient Name:  Isai Chapa    :  1960  MRN: [id number]  Physician:      Yanique Feldman MD   Insurance: [facility]; Reynaldo yr; 30/30vs; auth after 30vs; no pt resp-OOP met for    Medical Diagnosis: M79.18 (ICD-10-CM) - Gluteal pain, M47.816 (ICD-10-CM) - Lumbar spondylosis   Rehab Codes: M79.18 (ICD-10-CM) - Gluteal pain, M54.5  Onset date: 24               Next Dr's appt.: Pending  Visit# / total visits:      Cancels/No Shows: 0    Subjective:    Pain:  [x] Yes  [] No Location: Left gluteal Pain Rating: (0-10 scale) 1/10  Pain altered Tx:  [] No  [] Yes

## 2024-10-07 ENCOUNTER — HOSPITAL ENCOUNTER (OUTPATIENT)
Dept: PHYSICAL THERAPY | Facility: CLINIC | Age: 64
Setting detail: THERAPIES SERIES
Discharge: HOME | End: 2024-10-07
Payer: COMMERCIAL

## 2024-10-07 NOTE — FLOWSHEET NOTE
[] [facility]  Outpatient Rehabilitation &  Therapy  [address]  [phone]  [phone] [] [facility]  Outpatient Rehabilitation &  Therapy  [address]  P: [phone]  F: [phone] [] [facility]  Outpatient Rehabilitation &  Therapy  [address]  P: [phone]  F: [phone] [x] [facility]  Outpatient Rehabilitation &  Therapy  [address]  [phone]  [phone] [] [facility]  Outpatient Rehabilitation &  Therapy  [address]   P: [phone]  F: [phone]  [] [facility]  Outpatient Rehabilitation &  Therapy  [address]  P: [phone]  F: [phone] [] [facility]  Outpatient Rehabilitation &  Therapy  [address]  P: [phone]  F: [phone] [] [facility]  Outpatient Rehabilitation &  Therapy  [address]  P: [phone]  F: [phone] [] [facility]  Outpatient Rehabilitation &  Therapy  [address]  P: [phone]  F: [phone]  [] [facility] Outpatient Rehabilitation &  Therapy  [address]  P: [phone]  F: [phone]     Therapy Cancel/No Show note    Date: 10/7/2024  Patient: Isai Chapa  : 1960  MRN: [id number]    Cancels/No Shows to date: 0    For today's appointment patient:    [x]  Cancelled    [] Rescheduled appointment    [] No-show     Reason given by patient:    []  Patient ill    []  Conflicting appointment    [] No transportation      [] Conflict with work    [x] No reason given    [] Weather related    [] COVID-19    [] Other:      Comments:        [x] Next appointment was confirmed    Electronically signed by: Jorge Luis Silva PTA

## 2024-10-09 ENCOUNTER — HOSPITAL ENCOUNTER (OUTPATIENT)
Dept: PHYSICAL THERAPY | Facility: CLINIC | Age: 64
Setting detail: THERAPIES SERIES
Discharge: HOME | End: 2024-10-09
Payer: COMMERCIAL

## 2024-10-09 PROCEDURE — 97140 MANUAL THERAPY 1/> REGIONS: CPT

## 2024-10-09 PROCEDURE — 97110 THERAPEUTIC EXERCISES: CPT

## 2024-10-09 NOTE — FLOWSHEET NOTE
[] [facility]  Outpatient Rehabilitation &  Therapy  [address]  [phone]  [phone] [] [facility]  Outpatient Rehabilitation &  Therapy  [address]  P: [phone]  F: [phone] [] [facility]  Outpatient Rehabilitation &  Therapy  [address]  P: [phone]  F: [phone] [x] [facility]  Outpatient Rehabilitation &  Therapy  [address]  [phone]  [phone] [] [facility]  Outpatient Rehabilitation &  Therapy  [address]   P: [phone]  F: [phone]  [] [facility]  Outpatient Rehabilitation &  Therapy  [address]  P: [phone]  F: [phone] [] [facility]  Outpatient Rehabilitation &  Therapy  [address]  P: [phone]  F: [phone] [] [facility]  Outpatient Rehabilitation &  Therapy  [address]  P: [phone]  F: [phone] [] [facility]  Outpatient Rehabilitation &  Therapy  [address]  P: [phone]  F: [phone]  [] [facility] Outpatient Rehabilitation &  Therapy  [address]  P: [phone]  F: [phone]     Physical Therapy Daily Treatment Note    Date:  10/9/2024  Patient Name:  Isai Chapa    :  1960  MRN: [id number]  Physician:      Yanique Feldman MD   Insurance: [facility]; Reynaldo yr; 30/30vs; auth after 30vs; no pt resp-OOP met for    Medical Diagnosis: M79.18 (ICD-10-CM) - Gluteal pain, M47.816 (ICD-10-CM) - Lumbar spondylosis   Rehab Codes: M79.18 (ICD-10-CM) - Gluteal pain, M54.5  Onset date: 24               Next Dr's appt.: Pending  Visit# / total visits:      Cancels/No Shows: 0    Subjective:    Pain:  [x] Yes  [] No Location: Left gluteal Pain Rating: (0-10 scale) 1/10  Pain altered Tx:  [] No  [] Yes

## 2024-10-10 ENCOUNTER — HOSPITAL ENCOUNTER (OUTPATIENT)
Dept: MRI IMAGING | Age: 64
Discharge: HOME | End: 2024-10-12
Payer: COMMERCIAL

## 2024-10-10 DIAGNOSIS — M47.816 LUMBAR FACET ARTHROPATHY: ICD-10-CM

## 2024-10-10 DIAGNOSIS — M47.816 LUMBAR SPONDYLOSIS: ICD-10-CM

## 2024-10-10 DIAGNOSIS — M67.952 TENDINOPATHY OF LEFT GLUTEAL REGION: ICD-10-CM

## 2024-10-10 DIAGNOSIS — M25.552 LEFT HIP PAIN: ICD-10-CM

## 2024-10-10 PROCEDURE — 72148 MRI LUMBAR SPINE W/O DYE: CPT

## 2024-10-10 PROCEDURE — 73721 MRI JNT OF LWR EXTRE W/O DYE: CPT

## 2024-10-11 ENCOUNTER — TELEPHONE (OUTPATIENT)
Dept: ORTHOPEDIC SURGERY | Age: 64
End: 2024-10-11

## 2024-10-11 NOTE — TELEPHONE ENCOUNTER
----- Message from KIMBERLY Murillo sent at 10/11/2024  1:32 PM EDT -----  Low-grade tearing with possible tendinosis of the gluteus medius which could explain some of patient's pain.  He does have some mild bilateral hip OA but no evidence of fracture.  Moderate DJD of patient's low back but he is following with neurosurgery for this.  Would advise continued physical therapy for this left hip pain however if patient fails to improve can entertain the thought of a corticosteroid injection for bursitis/gluteal tendinopathy.

## 2024-10-15 ENCOUNTER — HOSPITAL ENCOUNTER (OUTPATIENT)
Dept: PHYSICAL THERAPY | Facility: CLINIC | Age: 64
Setting detail: THERAPIES SERIES
Discharge: HOME | End: 2024-10-15
Payer: COMMERCIAL

## 2024-10-15 ENCOUNTER — PATIENT MESSAGE (OUTPATIENT)
Dept: ORTHOPEDIC SURGERY | Age: 64
End: 2024-10-15

## 2024-10-15 PROCEDURE — 97110 THERAPEUTIC EXERCISES: CPT

## 2024-10-15 PROCEDURE — 97140 MANUAL THERAPY 1/> REGIONS: CPT

## 2024-10-15 NOTE — FLOWSHEET NOTE
[] [facility]  Outpatient Rehabilitation &  Therapy  [address]  [phone]  [phone] [] [facility]  Outpatient Rehabilitation &  Therapy  [address]  P: [phone]  F: [phone] [] [facility]  Outpatient Rehabilitation &  Therapy  [address]  P: [phone]  F: [phone] [x] [facility]  Outpatient Rehabilitation &  Therapy  [address]  [phone]  [phone] [] [facility]  Outpatient Rehabilitation &  Therapy  [address]   P: [phone]  F: [phone]  [] [facility]  Outpatient Rehabilitation &  Therapy  [address]  P: [phone]  F: [phone] [] [facility]  Outpatient Rehabilitation &  Therapy  [address]  P: [phone]  F: [phone] [] [facility]  Outpatient Rehabilitation &  Therapy  [address]  P: [phone]  F: [phone] [] [facility]  Outpatient Rehabilitation &  Therapy  [address]  P: [phone]  F: [phone]  [] [facility] Outpatient Rehabilitation &  Therapy  [address]  P: [phone]  F: [phone]     Physical Therapy Daily Treatment Note    Date:  10/15/2024  Patient Name:  Isai Chapa    :  1960  MRN: [id number]  Physician:      Yanique Feldman MD   Insurance: [facility]; Reynaldo yr; 30/30vs; auth after 30vs; no pt resp-OOP met for    Medical Diagnosis: M79.18 (ICD-10-CM) - Gluteal pain, M47.816 (ICD-10-CM) - Lumbar spondylosis   Rehab Codes: M79.18 (ICD-10-CM) - Gluteal pain, M54.5  Onset date: 24               Next Dr's appt.: Pending  Visit# / total visits:      Cancels/No Shows: 0    Subjective:    Pain:  [x] Yes  [] No Location: Left gluteal Pain Rating: (0-10 scale) 1/10  Pain altered Tx:  [] No  [] Yes

## 2024-10-18 ENCOUNTER — APPOINTMENT (OUTPATIENT)
Dept: PHYSICAL THERAPY | Facility: CLINIC | Age: 64
End: 2024-10-18
Payer: COMMERCIAL

## 2024-10-23 ENCOUNTER — HOSPITAL ENCOUNTER (OUTPATIENT)
Dept: PHYSICAL THERAPY | Facility: CLINIC | Age: 64
Setting detail: THERAPIES SERIES
Discharge: HOME | End: 2024-10-23
Payer: COMMERCIAL

## 2024-10-23 PROCEDURE — 97140 MANUAL THERAPY 1/> REGIONS: CPT

## 2024-10-23 PROCEDURE — 97110 THERAPEUTIC EXERCISES: CPT

## 2024-10-23 NOTE — FLOWSHEET NOTE
[] [facility]  Outpatient Rehabilitation &  Therapy  [address]  [phone]  [phone] [] [facility]  Outpatient Rehabilitation &  Therapy  [address]  P: [phone]  F: [phone] [] [facility]  Outpatient Rehabilitation &  Therapy  [address]  P: [phone]  F: [phone] [x] [facility]  Outpatient Rehabilitation &  Therapy  [address]  [phone]  [phone] [] [facility]  Outpatient Rehabilitation &  Therapy  [address]   P: [phone]  F: [phone]  [] [facility]  Outpatient Rehabilitation &  Therapy  [address]  P: [phone]  F: [phone] [] [facility]  Outpatient Rehabilitation &  Therapy  [address]  P: [phone]  F: [phone] [] [facility]  Outpatient Rehabilitation &  Therapy  [address]  P: [phone]  F: [phone] [] [facility]  Outpatient Rehabilitation &  Therapy  [address]  P: [phone]  F: [phone]  [] [facility] Outpatient Rehabilitation &  Therapy  [address]  P: [phone]  F: [phone]     Physical Therapy Daily Treatment Note    Date:  10/23/2024  Patient Name:  Isai Chapa    :  1960  MRN: [id number]  Physician:      Yanique Feldman MD   Insurance: [facility]; Reynaldo yr; 30/30vs; auth after 30vs; no pt resp-OOP met for    Medical Diagnosis: M79.18 (ICD-10-CM) - Gluteal pain, M47.816 (ICD-10-CM) - Lumbar spondylosis   Rehab Codes: M79.18 (ICD-10-CM) - Gluteal pain, M54.5  Onset date: 24               Next Dr's appt.: Pending  Visit# / total visits: 15/18     Cancels/No Shows: 0    Subjective:    Pain:  [x] Yes  [] No Location: Left gluteal Pain Rating: (0-10 scale) 1/10  Pain altered Tx:  [] No  [] Yes

## 2024-10-25 ENCOUNTER — TELEMEDICINE (OUTPATIENT)
Dept: NEUROSURGERY | Age: 64
End: 2024-10-25
Payer: COMMERCIAL

## 2024-10-25 DIAGNOSIS — M47.816 LUMBAR FACET ARTHROPATHY: Primary | ICD-10-CM

## 2024-10-25 DIAGNOSIS — M51.9 LUMBAR DISC DISEASE: ICD-10-CM

## 2024-10-25 PROCEDURE — 99214 OFFICE O/P EST MOD 30 MIN: CPT | Performed by: NURSE PRACTITIONER

## 2024-10-25 NOTE — PROGRESS NOTES
to authenticate this note.    --SLIME Flaherty CNP on 10/25/2024 at 3:28 PM    Isai MARCIAL Eduard, was evaluated through a synchronous (real-time) audio-video encounter. The patient (or guardian if applicable) is aware that this is a billable service, which includes applicable co-pays. This Virtual Visit was conducted with patient's (and/or legal guardian's) consent. Patient identification was verified, and a caregiver was present when appropriate.   The patient was located at Home: [address] [place] [address]  Provider was located at Facility (Appt Dept): [address]  [address], [address]  [place] [address]  Confirm you are appropriately licensed, registered, or certified to deliver care in the state where the patient is located as indicated above. If you are not or unsure, please re-schedule the visit: Yes, I confirm.      Total time spent for this encounter: Not billed by time    --SLIME Flaherty CNP on 10/25/2024 at 3:28 PM    An electronic signature was used to authenticate this note.

## 2024-10-28 ENCOUNTER — TELEPHONE (OUTPATIENT)
Dept: PULMONOLOGY | Age: 64
End: 2024-10-28

## 2024-10-28 NOTE — TELEPHONE ENCOUNTER
This was approved back in July. PA is not due for this until November 6th or after.  And he was switched to Auto injection and the PA I am getting a request for is Powder Vial.

## 2024-10-28 NOTE — TELEPHONE ENCOUNTER
Received a PA notice through covermeds for Nucala.  Am not sure how to forward to Rachael Walter so I processed the PA.

## 2024-10-29 ENCOUNTER — HOSPITAL ENCOUNTER (OUTPATIENT)
Dept: CT IMAGING | Age: 64
Discharge: HOME | End: 2024-10-31
Payer: COMMERCIAL

## 2024-10-29 DIAGNOSIS — M47.816 LUMBAR FACET ARTHROPATHY: ICD-10-CM

## 2024-10-29 DIAGNOSIS — M51.9 LUMBAR DISC DISEASE: ICD-10-CM

## 2024-10-29 PROCEDURE — 72131 CT LUMBAR SPINE W/O DYE: CPT

## 2024-10-30 ENCOUNTER — HOSPITAL ENCOUNTER (OUTPATIENT)
Dept: PHYSICAL THERAPY | Facility: CLINIC | Age: 64
Setting detail: THERAPIES SERIES
Discharge: HOME | End: 2024-10-30
Payer: COMMERCIAL

## 2024-10-30 PROCEDURE — 97110 THERAPEUTIC EXERCISES: CPT

## 2024-10-30 PROCEDURE — 97140 MANUAL THERAPY 1/> REGIONS: CPT

## 2024-10-30 NOTE — FLOWSHEET NOTE
[] [facility]  Outpatient Rehabilitation &  Therapy  [address]  [phone]  [phone] [] [facility]  Outpatient Rehabilitation &  Therapy  [address]  P: [phone]  F: [phone] [] [facility]  Outpatient Rehabilitation &  Therapy  [address]  P: [phone]  F: [phone] [x] [facility]  Outpatient Rehabilitation &  Therapy  [address]  [phone]  [phone] [] [facility]  Outpatient Rehabilitation &  Therapy  [address]   P: [phone]  F: [phone]  [] [facility]  Outpatient Rehabilitation &  Therapy  [address]  P: [phone]  F: [phone] [] [facility]  Outpatient Rehabilitation &  Therapy  [address]  P: [phone]  F: [phone] [] [facility]  Outpatient Rehabilitation &  Therapy  [address]  P: [phone]  F: [phone] [] [facility]  Outpatient Rehabilitation &  Therapy  [address]  P: [phone]  F: [phone]  [] [facility] Outpatient Rehabilitation &  Therapy  [address]  P: [phone]  F: [phone]     Physical Therapy Daily Treatment Note    Date:  10/30/2024  Patient Name:  Isai Chapa    :  1960  MRN: [id number]  Physician:      Yanique Feldman MD   Insurance: [facility]; Reynaldo yr; 30/30vs; auth after 30vs; no pt resp-OOP met for    Medical Diagnosis: M79.18 (ICD-10-CM) - Gluteal pain, M47.816 (ICD-10-CM) - Lumbar spondylosis   Rehab Codes: M79.18 (ICD-10-CM) - Gluteal pain, M54.5  Onset date: 24               Next Dr's appt.: Pending  Visit# / total visits:      Cancels/No Shows: 0    Subjective:    Pain:  [x] Yes  [] No Location: Left gluteal Pain Rating: (0-10 scale) 1/10  Pain altered Tx:  [] No  [] Yes

## 2024-11-04 ENCOUNTER — HOSPITAL ENCOUNTER (OUTPATIENT)
Dept: PREADMISSION TESTING | Age: 64
Discharge: HOME | End: 2024-11-08
Payer: COMMERCIAL

## 2024-11-04 VITALS
BODY MASS INDEX: 25.48 KG/M2 | HEIGHT: 71 IN | TEMPERATURE: 97.2 F | SYSTOLIC BLOOD PRESSURE: 130 MMHG | DIASTOLIC BLOOD PRESSURE: 76 MMHG | RESPIRATION RATE: 16 BRPM | WEIGHT: 182 LBS | OXYGEN SATURATION: 96 % | HEART RATE: 59 BPM

## 2024-11-04 LAB
ANION GAP SERPL CALCULATED.3IONS-SCNC: 7 MMOL/L (ref 9–17)
BUN SERPL-MCNC: 15 MG/DL (ref 8–23)
BUN/CREAT SERPL: 19 (ref 9–20)
CALCIUM SERPL-MCNC: 8.7 MG/DL (ref 8.6–10.4)
CHLORIDE SERPL-SCNC: 104 MMOL/L (ref 98–107)
CO2 SERPL-SCNC: 26 MMOL/L (ref 20–31)
CREAT SERPL-MCNC: 0.8 MG/DL (ref 0.7–1.2)
EKG ATRIAL RATE: 56 BPM
EKG P AXIS: 9 DEGREES
EKG P-R INTERVAL: 160 MS
EKG Q-T INTERVAL: 444 MS
EKG QRS DURATION: 88 MS
EKG QTC CALCULATION (BAZETT): 428 MS
EKG R AXIS: -40 DEGREES
EKG T AXIS: 9 DEGREES
EKG VENTRICULAR RATE: 56 BPM
ERYTHROCYTE [DISTWIDTH] IN BLOOD BY AUTOMATED COUNT: 13.2 % (ref 11.8–14.4)
GFR, ESTIMATED: >90 ML/MIN/1.73M2
GLUCOSE SERPL-MCNC: 104 MG/DL (ref 70–99)
HCT VFR BLD AUTO: 41.8 % (ref 40.7–50.3)
HGB BLD-MCNC: 14.3 G/DL (ref 13–17)
MCH RBC QN AUTO: 31.9 PG (ref 25.2–33.5)
MCHC RBC AUTO-ENTMCNC: 34.2 G/DL (ref 28.4–34.8)
MCV RBC AUTO: 93.3 FL (ref 82.6–102.9)
NRBC BLD-RTO: 0 PER 100 WBC
PLATELET # BLD AUTO: 164 K/UL (ref 138–453)
PMV BLD AUTO: 9.8 FL (ref 8.1–13.5)
POTASSIUM SERPL-SCNC: 3.5 MMOL/L (ref 3.7–5.3)
RBC # BLD AUTO: 4.48 M/UL (ref 4.21–5.77)
SODIUM SERPL-SCNC: 137 MMOL/L (ref 135–144)
WBC OTHER # BLD: 5.7 K/UL (ref 3.5–11.3)

## 2024-11-04 PROCEDURE — 36415 COLL VENOUS BLD VENIPUNCTURE: CPT

## 2024-11-04 PROCEDURE — 80048 BASIC METABOLIC PNL TOTAL CA: CPT

## 2024-11-04 PROCEDURE — 85027 COMPLETE CBC AUTOMATED: CPT

## 2024-11-04 PROCEDURE — 93005 ELECTROCARDIOGRAM TRACING: CPT | Performed by: ANESTHESIOLOGY

## 2024-11-04 RX ORDER — OXYMETAZOLINE HYDROCHLORIDE 0.05 G/100ML
2 SPRAY NASAL SEE ADMIN INSTRUCTIONS
OUTPATIENT
Start: 2024-11-22 | End: 2024-11-24

## 2024-11-04 ASSESSMENT — PAIN SCALES - GENERAL: PAINLEVEL_OUTOF10: 0

## 2024-11-04 NOTE — PRE-CERTIFICATION NOTE
contagious disease (chicken pox, measles, etc.) Please call your doctor before coming to the hospital.    If your child is having surgery please make arrangements for any other children to be cared for at home on the day of surgery.  Other children are not permitted in recovery room and we want you to be able to spend time with the patient.  If other arrangements are not available then we suggest that you have a second adult to stay in the waiting room.       Day of Surgery/Procedure:    As a patient at [facility] you can expect quality medical and nursing care that is centered on your individual needs.  Our goal is to make your surgical experience as comfortable as possible    .  Transportation After Your Surgery/Procedure:    You will need a friend or family member to drive you home after your procedure.  Your  must be 18 years of age or older and able to sign off on your discharge instructions.  A taxi cab or any other form of public transportation is not acceptable.  Your friend or family member must stay at the hospital throughout your procedure.    Someone must remain with you for the first 24 hours after your surgery if you receive anesthesia or medication.  If you do not have someone to stay with you, your procedure may be cancelled.      If you have any other questions regarding your procedure or the day of surgery, please call [phone]      _________________________  ____________________________  Signature (Patient)              Signature (Provider) & date

## 2024-11-06 ENCOUNTER — HOSPITAL ENCOUNTER (OUTPATIENT)
Dept: PHYSICAL THERAPY | Facility: CLINIC | Age: 64
Setting detail: THERAPIES SERIES
Discharge: HOME | End: 2024-11-06
Payer: COMMERCIAL

## 2024-11-06 ENCOUNTER — TELEPHONE (OUTPATIENT)
Dept: INTERNAL MEDICINE | Age: 64
End: 2024-11-06

## 2024-11-06 ENCOUNTER — HOSPITAL ENCOUNTER (OUTPATIENT)
Facility: CLINIC | Age: 64
Discharge: HOME | End: 2024-11-06

## 2024-11-06 PROCEDURE — 9990000118

## 2024-11-06 PROCEDURE — 97110 THERAPEUTIC EXERCISES: CPT

## 2024-11-06 PROCEDURE — 97140 MANUAL THERAPY 1/> REGIONS: CPT

## 2024-11-06 NOTE — FLOWSHEET NOTE
[] [facility]  Outpatient Rehabilitation &  Therapy  [address]  [phone]  [phone] [] [facility]  Outpatient Rehabilitation &  Therapy  [address]  P: [phone]  F: [phone] [] [facility]  Outpatient Rehabilitation &  Therapy  [address]  P: [phone]  F: [phone] [x] [facility]  Outpatient Rehabilitation &  Therapy  [address]  [phone]  [phone] [] [facility]  Outpatient Rehabilitation &  Therapy  [address]   P: [phone]  F: [phone]  [] [facility]  Outpatient Rehabilitation &  Therapy  [address]  P: [phone]  F: [phone] [] [facility]  Outpatient Rehabilitation &  Therapy  [address]  P: [phone]  F: [phone] [] [facility]  Outpatient Rehabilitation &  Therapy  [address]  P: [phone]  F: [phone] [] [facility]  Outpatient Rehabilitation &  Therapy  [address]  P: [phone]  F: [phone]  [] [facility] Outpatient Rehabilitation &  Therapy  [address]  P: [phone]  F: [phone]     Physical Therapy Daily Treatment Note    Date:  2024  Patient Name:  Isai Chapa    :  1960  MRN: [id number]  Physician:      Yanique Feldman MD   Insurance: [facility]; Reynaldo yr; 30/30vs; auth after 30vs; no pt resp-OOP met for    Medical Diagnosis: M79.18 (ICD-10-CM) - Gluteal pain, M47.816 (ICD-10-CM) - Lumbar spondylosis   Rehab Codes: M79.18 (ICD-10-CM) - Gluteal pain, M54.5  Onset date: 24               Next Dr's appt.: Pending  Visit# / total visits:      Cancels/No Shows: 0    Subjective:    Pain:  [x] Yes  [] No Location: Left gluteal Pain Rating: (0-10 scale) 1/10  Pain altered Tx:  [] No  [] Yes

## 2024-11-06 NOTE — CONSULTS
[x] [facility]  Outpatient Rehabilitation &  Therapy  [address]  P: [phone]  F: [phone] [] [facility]  Outpatient Rehabilitation &  Therapy  [address]  P: [phone]  F: [phone]       Date:  2024    Patient: Isai Chapa : 1960   MRN: [id number]    Height: 5'11\"  Weight: 175#    Weight history: always around 170-172 up to 180    Medications: refer to full medical chart     Vitamins/Supplements: Bio V, complete mineral, magnesium malate, k2 potassium, NAD       PMHx: [] Unremarkable[] Eating Disorder/Disordered Eating[] REDs               [] Pacemaker   [] MI/Heart Problems [] Cancer [] Arthritis   [x] Other: activity induced asthma               [] Refer to full medical chart  In EPIC       Tests: [] X-Ray: [] MRI:  [] Other:    Medications: [x] Refer to full medical record [] None [] Other:  Allergies:      [x] Refer to full medical record [] None [] Other:    Current Exercise: was running 5 days/week, 2 days off; then got injured so was biking and elliptical; now elliptical 2 days/week for ~45 min to 1 hour and running 3 days/week (long run weekend 5-8 miles, speed between 4-5 miles w/ w/u and c/d, 3-5 easy)    *Wants to get back into multisport   *Doing Fannie half this weekend (maybe 2:20)     Does runs/elliptical after work so late afternoons     Fueling before/during runs/races: Maurten gels q 40-50 minutes; adds Maurten 320 each day; 160 day of race maybe a banana ; no electrolytes beforehand ; uses belt with water and will maybe grab a Gatorade on course. Will maybe do a honey stinger waffle before long run ;     Current intake:    *wake up 5 AM ( - ) 6:30-7 in winter    -5:15 AM: maybe hard boiled egg (eggs or yogurt maybe during winter season); coffee once at work   -8:30-9 AM: pb filled pretzels ; maybe greek yogurt w/ protein nuts ; will not always eat here  -12-12:30 PM Lunch: maybe

## 2024-11-06 NOTE — TELEPHONE ENCOUNTER
Specialty Medication Service    Date: 11/6/2024  Patient's Name: Isai Chapa YOB: 1960            _____________________________________________________________________________________________    Left 3 messages, Sent Mychart message, and Sent text message to reschedule PharmD follow up appointment for Specialty Medication Services. Please call: [phone] option 4. Will continue to outreach as appropriate. 3/9 attempts made to move pt to an earlier appt date to get notes to do pa renewal.    Marina Dc CPhT  Pharmacy   Specialty Medication Services   Phone: [phone] option 4    For Pharmacy Admin Tracking Only    Program: SMS  CPA in place:  Yes  Recommendation Provided To: Patient/Caregiver: 1 via Telephone  Intervention Detail: Scheduled Appointment  Intervention Accepted By: Patient/Caregiver: 0  Gap Closed?:    Time Spent (min): 15

## 2024-11-07 ENCOUNTER — TELEPHONE (OUTPATIENT)
Dept: UROLOGY | Age: 64
End: 2024-11-07

## 2024-11-07 ENCOUNTER — TELEMEDICINE (OUTPATIENT)
Dept: NEUROSURGERY | Age: 64
End: 2024-11-07
Payer: COMMERCIAL

## 2024-11-07 DIAGNOSIS — M47.816 LUMBAR SPONDYLOSIS: ICD-10-CM

## 2024-11-07 DIAGNOSIS — M51.9 LUMBAR DISC DISEASE: Primary | ICD-10-CM

## 2024-11-07 DIAGNOSIS — Z12.5 SCREENING FOR PROSTATE CANCER: Primary | ICD-10-CM

## 2024-11-07 PROCEDURE — 99214 OFFICE O/P EST MOD 30 MIN: CPT | Performed by: NEUROLOGICAL SURGERY

## 2024-11-07 NOTE — TELEPHONE ENCOUNTER
PSA order is placed per protocol for appt per Dr Resendiz.   Pt asks that appt be moved out a week as he is going out of town tomorrow.

## 2024-11-07 NOTE — PROGRESS NOTES
2024    TELEHEALTH EVALUATION -- Audio/Visual    HPI:    Isai Chapa (:  1960) has requested an audio/video evaluation for the following concern(s):    MBB failed  Basivertebral abalation worked well for 4 months.     Review of Systems    Prior to Visit Medications    Medication Sig Taking? Authorizing Provider   mepolizumab (NUCALA) 100 MG/ML SOAJ injection Inject 1 mL into the skin every 28 days  Margot Garcia MD   albuterol sulfate HFA (PROAIR HFA) 108 (90 Base) MCG/ACT inhaler Inhale 2 puffs into the lungs every 6 hours as needed for Wheezing or Shortness of Breath  Shady Cox DO   mometasone-formoterol (DULERA) 200-5 MCG/ACT inhaler Inhale 2 puffs into the lungs in the morning and 2 puffs in the evening.  Shady Cox DO   montelukast (SINGULAIR) 10 MG tablet Take 1 tablet by mouth nightly  Shady Cox DO   tadalafil (CIALIS) 5 MG tablet Take 1 tablet by mouth daily  Patient taking differently: Take 1 tablet by mouth daily Patient takes on Tuesday, Thursday, Saturday and   Maurizio Resendiz MD   SUMAtriptan (IMITREX) 100 MG tablet Take 1 tablet by mouth daily as needed for Migraine  Yanique Feldman MD   ramipril (ALTACE) 1.25 MG capsule TAKE 1 CAPSULE BY MOUTH ONE TIME A DAY  Yanique Feldman MD   omeprazole (PRILOSEC OTC) 20 MG tablet 1 tablet daily OTC  Provider, MD Elba       Social History     Tobacco Use    Smoking status: Former     Types: Cigars     Quit date:      Years since quittin.8    Smokeless tobacco: Never    Tobacco comments:     patient states 4 cigars a year    Vaping Use    Vaping status: Never Used   Substance Use Topics    Alcohol use: Yes     Comment: Maybe 2-3 alcoholic beverages per month    Drug use: No            PHYSICAL EXAMINATION:  [ INSTRUCTIONS:  \"[x]\" Indicates a positive item  \"[]\" Indicates a negative item  -- DELETE ALL ITEMS NOT EXAMINED]  Vital Signs: (As obtained by patient/caregiver or practitioner

## 2024-11-08 ENCOUNTER — APPOINTMENT (OUTPATIENT)
Dept: PHYSICAL THERAPY | Facility: CLINIC | Age: 64
End: 2024-11-08
Payer: COMMERCIAL

## 2024-11-11 ENCOUNTER — TELEPHONE (OUTPATIENT)
Dept: INTERNAL MEDICINE | Age: 64
End: 2024-11-11

## 2024-11-11 DIAGNOSIS — J45.51 ASTHMA, SEVERE PERSISTENT, POORLY-CONTROLLED, WITH ACUTE EXACERBATION: Primary | ICD-10-CM

## 2024-11-11 NOTE — TELEPHONE ENCOUNTER
Specialty Medication Service    Date: 11/11/2024  Patient's Name: Isai Chapa YOB: 1960            _____________________________________________________________________________________________    Previous PA has been denied and appeal needs completed. [employer] to fax appeal form to [facility] at [phone].    Terry Davis, ShraddhaD, [facility]  Ambulatory Clinical Pharmacist   [facility] Specialty Medication Service  Phone: [phone]  [facility]  Phone: [phone] option 1

## 2024-11-11 NOTE — TELEPHONE ENCOUNTER
Specialty Medication Service    Date: 11/11/2024  Patient's Name: Isai Chapa YOB: 1960            _____________________________________________________________________________________________    PA for patient's Nucala submitted to payer for approval. [facility] key: [id number]. Will continue to monitor for PA determination.     Terry Davis, PharmD, [facility]  Ambulatory Clinical Pharmacist   [facility] Specialty Medication Service  Phone: [phone]  [facility]  Phone: [phone] option 1

## 2024-11-12 ENCOUNTER — HOSPITAL ENCOUNTER (OUTPATIENT)
Age: 64
Setting detail: SPECIMEN
Discharge: HOME | End: 2024-11-12

## 2024-11-12 DIAGNOSIS — Z12.5 SCREENING FOR PROSTATE CANCER: ICD-10-CM

## 2024-11-12 PROBLEM — M47.816 LUMBAR SPONDYLOSIS: Status: ACTIVE | Noted: 2024-11-12

## 2024-11-12 PROBLEM — M51.9 LUMBAR DISC DISEASE: Status: ACTIVE | Noted: 2020-11-16

## 2024-11-12 LAB — PSA SERPL-MCNC: 2.6 NG/ML (ref 0–4)

## 2024-11-14 ENCOUNTER — HOSPITAL ENCOUNTER (OUTPATIENT)
Dept: PHYSICAL THERAPY | Facility: CLINIC | Age: 64
Setting detail: THERAPIES SERIES
Discharge: HOME | End: 2024-11-14
Payer: COMMERCIAL

## 2024-11-14 PROCEDURE — 97140 MANUAL THERAPY 1/> REGIONS: CPT

## 2024-11-14 PROCEDURE — 97110 THERAPEUTIC EXERCISES: CPT

## 2024-11-14 NOTE — FLOWSHEET NOTE
consistent with a diagnosis of gluteus medius dysfunction.  He present with pain, back pain, hip weakness, tenderness and functional limitations.  He will benefit from skilled physical therapy to address above limitations.     STG/LTG  STG: (to be met in 5 treatments)  ? Pain: <2/10.  met  ? Strength: 5/5. met  ? Function: Patient will resume running. Met  Patient to be independent with home exercise program as demonstrated by performance with correct form without cues. Met     LTG: (to be met in 18 treatments)  The patient will demonstrate pain free normalized running gait pattern. Met  The patient will ascend and descend stairs step over step without pain or use of a rail. Met  Patient will resume training for his fall 1/2 marathon schedule, Met     Pt. Education:  [x] Yes  [] No. Hip anatomy  [x] Updated HEP/Ed  Method of Education: [x] Verbal  [x] Demo  [x] Written  Access Code: [email or web address]  URL: [email or web address]  Date: 11/14/2024  Prepared by: José Nunez    Exercises  - Modified Side Plank with Hip Abduction  - 1 x daily - 7 x weekly - 1-2 sets - 10-15 reps  - Single Leg Bridge  - 1 x daily - 7 x weekly - 1-2 sets - 10-15 reps  - Runner's Touch  - 1 x daily - 2-3 x weekly - 3 sets - 10 reps  - Mauritian Split Squat  - 1 x daily - 2-3 x weekly - 3 sets - 10 reps  Comprehension of Education:  [] Verbalizes understanding.  [x] Demonstrates understanding.  [] Needs review.  [] Demonstrates/verbalizes HEP/Ed previously given.     Plan: [x] Continue current frequency toward long and short term goals.    [x] Specific Instructions for subsequent treatments:  MT PRN, progressive hip strength, return to run program         Time In: 11:00 AM            Time Out: 11:54 AM    Electronically signed by:  José Nunez, PT

## 2024-11-17 NOTE — PROGRESS NOTES
Subjective:      Patient ID: Isai Chapa is a 64 y.o. male.     Patient must see physician at next appointment    HPI  Patient here for follow-up for eosinophilic asthma, chronic sinusitis. Last seen in office on June 2024 per       [] Dr. Gomes  [] Dr. Alicia  [] Dr. Pereira  [] Dr. Robb  [] Dr. Mcginnis  [] Dr. Flower  [x] Dr. Cox  [] SHEILA Roberts APRN- CNP      Today's visit:     Patient states that he is doing well, endorses a persistent cough for the last 5 years, this is unchanged.  Occasionally it is productive of pale light green secretions.  He endorses that he is scheduled for sinus surgery on Friday for left-sided sinus opacification.  Following with Dr. Ku.    Patient has Nucala, for unclear reasons his Nucala was changed to a powder format.  Patient received a denial letter and indicates he needs the appeal completed.  I reordered the autoinjector, patient has been benefiting with Nucala stating that his breathing has been much better controlled.  Reduced amount of wheezing and shortness of breath and cough.  Patient continues on his controller meds.      Medications:   Nucala - last dose was a week ago? Home injections.   Albuterol HFA  Dulera 200-5  Montelukast      Smoking status:  Cigarettes: Former social cigar smoker quit in 2011.   Illicit: no  Vaping: no    Exposure History:  Employment:   Travel out of country: to the  once every other year  Chemical exposure: no  Pets at home: cat  Previous Bird/turtle exposure: no  Indoor hot tub/sauna's: no  Family history of lung cancer/lung disease: no  IV drug abuse: no    PRIOR WORKUP:  PFT:  PFT 6/25/2019: FVC 4.71, 99% of predicted.  FEV1 3.81, 100% of predicted.  FEV1/FVC ratio 81, 100% of predicted.  RV 1.77, 74% of predicted.  TLC 6.74, 97% of predicted.  DSB 26.76, 97% of predicted.  Final impression normal pulmonary function test.    CT Imaging:  No CT imaging of the chest    Cardiac Workup:  Echocardiogram: None on

## 2024-11-19 ENCOUNTER — OFFICE VISIT (OUTPATIENT)
Dept: PULMONOLOGY | Age: 64
End: 2024-11-19
Payer: COMMERCIAL

## 2024-11-19 ENCOUNTER — TELEPHONE (OUTPATIENT)
Dept: PULMONOLOGY | Age: 64
End: 2024-11-19

## 2024-11-19 VITALS
BODY MASS INDEX: 25.76 KG/M2 | DIASTOLIC BLOOD PRESSURE: 80 MMHG | OXYGEN SATURATION: 95 % | SYSTOLIC BLOOD PRESSURE: 133 MMHG | WEIGHT: 184 LBS | HEIGHT: 71 IN | RESPIRATION RATE: 16 BRPM | HEART RATE: 52 BPM

## 2024-11-19 DIAGNOSIS — J82.83 EOSINOPHILIC ASTHMA: ICD-10-CM

## 2024-11-19 DIAGNOSIS — J45.51 ASTHMA, SEVERE PERSISTENT, POORLY-CONTROLLED, WITH ACUTE EXACERBATION: Primary | ICD-10-CM

## 2024-11-19 DIAGNOSIS — J32.9 CHRONIC SINUSITIS, UNSPECIFIED LOCATION: ICD-10-CM

## 2024-11-19 PROCEDURE — 99214 OFFICE O/P EST MOD 30 MIN: CPT | Performed by: NURSE PRACTITIONER

## 2024-11-19 RX ORDER — MEPOLIZUMAB 100 MG/ML
100 INJECTION, SOLUTION SUBCUTANEOUS
Qty: 1 ML | Refills: 11 | Status: ACTIVE | OUTPATIENT
Start: 2024-11-19

## 2024-11-19 ASSESSMENT — ENCOUNTER SYMPTOMS
EYES NEGATIVE: 1
GASTROINTESTINAL NEGATIVE: 1
ALLERGIC/IMMUNOLOGIC NEGATIVE: 1

## 2024-11-19 NOTE — TELEPHONE ENCOUNTER
Received a PA for Nucala auto injecter. This has been routed to Rachael Walter our [facility].

## 2024-11-22 ENCOUNTER — ANESTHESIA EVENT (OUTPATIENT)
Dept: OPERATING ROOM | Age: 64
End: 2024-11-22
Payer: COMMERCIAL

## 2024-11-22 ENCOUNTER — ANESTHESIA (OUTPATIENT)
Dept: OPERATING ROOM | Age: 64
End: 2024-11-22
Payer: COMMERCIAL

## 2024-11-22 ENCOUNTER — HOSPITAL ENCOUNTER (OUTPATIENT)
Age: 64
Setting detail: OUTPATIENT SURGERY
Discharge: HOME | End: 2024-11-22
Attending: OTOLARYNGOLOGY | Admitting: OTOLARYNGOLOGY
Payer: COMMERCIAL

## 2024-11-22 VITALS
HEIGHT: 71 IN | WEIGHT: 182 LBS | RESPIRATION RATE: 14 BRPM | OXYGEN SATURATION: 96 % | BODY MASS INDEX: 25.48 KG/M2 | DIASTOLIC BLOOD PRESSURE: 89 MMHG | SYSTOLIC BLOOD PRESSURE: 149 MMHG | TEMPERATURE: 97.3 F | HEART RATE: 69 BPM

## 2024-11-22 DIAGNOSIS — J34.3 HYPERTROPHY OF NASAL TURBINATES: ICD-10-CM

## 2024-11-22 DIAGNOSIS — J32.4 CHRONIC PANSINUSITIS: ICD-10-CM

## 2024-11-22 DIAGNOSIS — J34.2 DEVIATED NASAL SEPTUM: ICD-10-CM

## 2024-11-22 DIAGNOSIS — G89.18 POSTOPERATIVE PAIN: Primary | ICD-10-CM

## 2024-11-22 DIAGNOSIS — J32.8 OTHER CHRONIC SINUSITIS: ICD-10-CM

## 2024-11-22 PROCEDURE — 88305 TISSUE EXAM BY PATHOLOGIST: CPT

## 2024-11-22 PROCEDURE — 6360000002 HC RX W HCPCS

## 2024-11-22 PROCEDURE — 7100000000 HC PACU RECOVERY - FIRST 15 MIN: Performed by: OTOLARYNGOLOGY

## 2024-11-22 PROCEDURE — 6370000000 HC RX 637 (ALT 250 FOR IP): Performed by: OTOLARYNGOLOGY

## 2024-11-22 PROCEDURE — 7100000010 HC PHASE II RECOVERY - FIRST 15 MIN: Performed by: OTOLARYNGOLOGY

## 2024-11-22 PROCEDURE — 6360000002 HC RX W HCPCS: Performed by: OTOLARYNGOLOGY

## 2024-11-22 PROCEDURE — 2580000003 HC RX 258: Performed by: OTOLARYNGOLOGY

## 2024-11-22 PROCEDURE — 88365 INSITU HYBRIDIZATION (FISH): CPT

## 2024-11-22 PROCEDURE — 3600000012 HC SURGERY LEVEL 2 ADDTL 15MIN: Performed by: OTOLARYNGOLOGY

## 2024-11-22 PROCEDURE — 2580000003 HC RX 258: Performed by: ANESTHESIOLOGY

## 2024-11-22 PROCEDURE — 87185 SC STD ENZYME DETCJ PER NZM: CPT

## 2024-11-22 PROCEDURE — 2709999900 HC NON-CHARGEABLE SUPPLY: Performed by: OTOLARYNGOLOGY

## 2024-11-22 PROCEDURE — 87076 CULTURE ANAEROBE IDENT EACH: CPT

## 2024-11-22 PROCEDURE — 3600000002 HC SURGERY LEVEL 2 BASE: Performed by: OTOLARYNGOLOGY

## 2024-11-22 PROCEDURE — 87075 CULTR BACTERIA EXCEPT BLOOD: CPT

## 2024-11-22 PROCEDURE — 3700000000 HC ANESTHESIA ATTENDED CARE: Performed by: OTOLARYNGOLOGY

## 2024-11-22 PROCEDURE — 7100000001 HC PACU RECOVERY - ADDTL 15 MIN: Performed by: OTOLARYNGOLOGY

## 2024-11-22 PROCEDURE — 88364 INSITU HYBRIDIZATION (FISH): CPT

## 2024-11-22 PROCEDURE — 87176 TISSUE HOMOGENIZATION CULTR: CPT

## 2024-11-22 PROCEDURE — 87205 SMEAR GRAM STAIN: CPT

## 2024-11-22 PROCEDURE — 86403 PARTICLE AGGLUT ANTBDY SCRN: CPT

## 2024-11-22 PROCEDURE — 87070 CULTURE OTHR SPECIMN AEROBIC: CPT

## 2024-11-22 PROCEDURE — 87186 SC STD MICRODIL/AGAR DIL: CPT

## 2024-11-22 PROCEDURE — 87077 CULTURE AEROBIC IDENTIFY: CPT

## 2024-11-22 PROCEDURE — 2720000010 HC SURG SUPPLY STERILE: Performed by: OTOLARYNGOLOGY

## 2024-11-22 PROCEDURE — 2500000003 HC RX 250 WO HCPCS: Performed by: NURSE ANESTHETIST, CERTIFIED REGISTERED

## 2024-11-22 PROCEDURE — 6360000002 HC RX W HCPCS: Performed by: NURSE ANESTHETIST, CERTIFIED REGISTERED

## 2024-11-22 PROCEDURE — 3700000001 HC ADD 15 MINUTES (ANESTHESIA): Performed by: OTOLARYNGOLOGY

## 2024-11-22 RX ORDER — LIDOCAINE HYDROCHLORIDE 10 MG/ML
1 INJECTION, SOLUTION EPIDURAL; INFILTRATION; INTRACAUDAL; PERINEURAL
Status: DISCONTINUED | OUTPATIENT
Start: 2024-11-23 | End: 2024-11-22 | Stop reason: HOSPADM

## 2024-11-22 RX ORDER — TRIAMCINOLONE ACETONIDE 40 MG/ML
INJECTION, SUSPENSION INTRA-ARTICULAR; INTRAMUSCULAR PRN
Status: DISCONTINUED | OUTPATIENT
Start: 2024-11-22 | End: 2024-11-22 | Stop reason: ALTCHOICE

## 2024-11-22 RX ORDER — LABETALOL HYDROCHLORIDE 5 MG/ML
INJECTION, SOLUTION INTRAVENOUS
Status: DISCONTINUED | OUTPATIENT
Start: 2024-11-22 | End: 2024-11-22 | Stop reason: SDUPTHER

## 2024-11-22 RX ORDER — OXYMETAZOLINE HYDROCHLORIDE 0.05 G/100ML
SPRAY NASAL PRN
Status: DISCONTINUED | OUTPATIENT
Start: 2024-11-22 | End: 2024-11-22 | Stop reason: ALTCHOICE

## 2024-11-22 RX ORDER — GINSENG 100 MG
CAPSULE ORAL PRN
Status: DISCONTINUED | OUTPATIENT
Start: 2024-11-22 | End: 2024-11-22 | Stop reason: ALTCHOICE

## 2024-11-22 RX ORDER — SODIUM CHLORIDE 9 MG/ML
INJECTION, SOLUTION INTRAVENOUS CONTINUOUS
Status: DISCONTINUED | OUTPATIENT
Start: 2024-11-22 | End: 2024-11-22 | Stop reason: HOSPADM

## 2024-11-22 RX ORDER — PROPOFOL 10 MG/ML
INJECTION, EMULSION INTRAVENOUS
Status: DISCONTINUED | OUTPATIENT
Start: 2024-11-22 | End: 2024-11-22 | Stop reason: SDUPTHER

## 2024-11-22 RX ORDER — SODIUM CHLORIDE 9 MG/ML
INJECTION, SOLUTION INTRAVENOUS PRN
Status: DISCONTINUED | OUTPATIENT
Start: 2024-11-22 | End: 2024-11-22 | Stop reason: HOSPADM

## 2024-11-22 RX ORDER — LIDOCAINE HYDROCHLORIDE AND EPINEPHRINE 10; 10 MG/ML; UG/ML
INJECTION, SOLUTION INFILTRATION; PERINEURAL PRN
Status: DISCONTINUED | OUTPATIENT
Start: 2024-11-22 | End: 2024-11-22 | Stop reason: ALTCHOICE

## 2024-11-22 RX ORDER — OXYCODONE HYDROCHLORIDE 5 MG/1
5 TABLET ORAL
Status: DISCONTINUED | OUTPATIENT
Start: 2024-11-22 | End: 2024-11-22 | Stop reason: HOSPADM

## 2024-11-22 RX ORDER — SODIUM CHLORIDE 0.9 % (FLUSH) 0.9 %
5-40 SYRINGE (ML) INJECTION EVERY 12 HOURS SCHEDULED
Status: DISCONTINUED | OUTPATIENT
Start: 2024-11-22 | End: 2024-11-22 | Stop reason: HOSPADM

## 2024-11-22 RX ORDER — MIDAZOLAM HYDROCHLORIDE 1 MG/ML
INJECTION, SOLUTION INTRAMUSCULAR; INTRAVENOUS
Status: DISCONTINUED | OUTPATIENT
Start: 2024-11-22 | End: 2024-11-22 | Stop reason: SDUPTHER

## 2024-11-22 RX ORDER — ONDANSETRON 2 MG/ML
INJECTION INTRAMUSCULAR; INTRAVENOUS
Status: DISCONTINUED | OUTPATIENT
Start: 2024-11-22 | End: 2024-11-22 | Stop reason: SDUPTHER

## 2024-11-22 RX ORDER — FENTANYL CITRATE 50 UG/ML
INJECTION, SOLUTION INTRAMUSCULAR; INTRAVENOUS
Status: DISCONTINUED | OUTPATIENT
Start: 2024-11-22 | End: 2024-11-22 | Stop reason: SDUPTHER

## 2024-11-22 RX ORDER — DEXAMETHASONE SODIUM PHOSPHATE 10 MG/ML
INJECTION, SOLUTION INTRAMUSCULAR; INTRAVENOUS
Status: DISCONTINUED | OUTPATIENT
Start: 2024-11-22 | End: 2024-11-22 | Stop reason: SDUPTHER

## 2024-11-22 RX ORDER — HYDRALAZINE HYDROCHLORIDE 20 MG/ML
INJECTION INTRAMUSCULAR; INTRAVENOUS
Status: COMPLETED
Start: 2024-11-22 | End: 2024-11-22

## 2024-11-22 RX ORDER — HALOPERIDOL 5 MG/ML
1 INJECTION INTRAMUSCULAR
Status: DISCONTINUED | OUTPATIENT
Start: 2024-11-22 | End: 2024-11-22 | Stop reason: HOSPADM

## 2024-11-22 RX ORDER — OXYCODONE AND ACETAMINOPHEN 5; 325 MG/1; MG/1
1 TABLET ORAL EVERY 6 HOURS PRN
Qty: 28 TABLET | Refills: 0 | Status: SHIPPED | OUTPATIENT
Start: 2024-11-22 | End: 2024-11-29

## 2024-11-22 RX ORDER — CEFAZOLIN SODIUM/WATER 2 G/20 ML
2000 SYRINGE (ML) INTRAVENOUS ONCE
Status: COMPLETED | OUTPATIENT
Start: 2024-11-22 | End: 2024-11-22

## 2024-11-22 RX ORDER — LIDOCAINE HYDROCHLORIDE 20 MG/ML
INJECTION, SOLUTION EPIDURAL; INFILTRATION; INTRACAUDAL; PERINEURAL
Status: DISCONTINUED | OUTPATIENT
Start: 2024-11-22 | End: 2024-11-22 | Stop reason: SDUPTHER

## 2024-11-22 RX ORDER — SODIUM CHLORIDE, SODIUM LACTATE, POTASSIUM CHLORIDE, CALCIUM CHLORIDE 600; 310; 30; 20 MG/100ML; MG/100ML; MG/100ML; MG/100ML
INJECTION, SOLUTION INTRAVENOUS CONTINUOUS
Status: DISCONTINUED | OUTPATIENT
Start: 2024-11-22 | End: 2024-11-22 | Stop reason: HOSPADM

## 2024-11-22 RX ORDER — SODIUM CHLORIDE 0.9 % (FLUSH) 0.9 %
5-40 SYRINGE (ML) INJECTION PRN
Status: DISCONTINUED | OUTPATIENT
Start: 2024-11-22 | End: 2024-11-22 | Stop reason: HOSPADM

## 2024-11-22 RX ORDER — FENTANYL CITRATE 50 UG/ML
25 INJECTION, SOLUTION INTRAMUSCULAR; INTRAVENOUS EVERY 5 MIN PRN
Status: DISCONTINUED | OUTPATIENT
Start: 2024-11-22 | End: 2024-11-22 | Stop reason: HOSPADM

## 2024-11-22 RX ORDER — OXYMETAZOLINE HYDROCHLORIDE 0.05 G/100ML
2 SPRAY NASAL SEE ADMIN INSTRUCTIONS
Status: DISCONTINUED | OUTPATIENT
Start: 2024-11-22 | End: 2024-11-22 | Stop reason: HOSPADM

## 2024-11-22 RX ORDER — AMOXICILLIN 500 MG/1
500 CAPSULE ORAL 3 TIMES DAILY
Qty: 21 CAPSULE | Refills: 0 | Status: SHIPPED | OUTPATIENT
Start: 2024-11-22 | End: 2024-11-29

## 2024-11-22 RX ORDER — NALOXONE HYDROCHLORIDE 0.4 MG/ML
INJECTION, SOLUTION INTRAMUSCULAR; INTRAVENOUS; SUBCUTANEOUS PRN
Status: DISCONTINUED | OUTPATIENT
Start: 2024-11-22 | End: 2024-11-22 | Stop reason: HOSPADM

## 2024-11-22 RX ORDER — HYDRALAZINE HYDROCHLORIDE 20 MG/ML
5 INJECTION INTRAMUSCULAR; INTRAVENOUS
Status: COMPLETED | OUTPATIENT
Start: 2024-11-22 | End: 2024-11-22

## 2024-11-22 RX ORDER — METOCLOPRAMIDE HYDROCHLORIDE 5 MG/ML
10 INJECTION INTRAMUSCULAR; INTRAVENOUS
Status: DISCONTINUED | OUTPATIENT
Start: 2024-11-22 | End: 2024-11-22 | Stop reason: HOSPADM

## 2024-11-22 RX ORDER — ROCURONIUM BROMIDE 10 MG/ML
INJECTION, SOLUTION INTRAVENOUS
Status: DISCONTINUED | OUTPATIENT
Start: 2024-11-22 | End: 2024-11-22 | Stop reason: SDUPTHER

## 2024-11-22 RX ORDER — HYDROMORPHONE HYDROCHLORIDE 1 MG/ML
0.5 INJECTION, SOLUTION INTRAMUSCULAR; INTRAVENOUS; SUBCUTANEOUS EVERY 5 MIN PRN
Status: DISCONTINUED | OUTPATIENT
Start: 2024-11-22 | End: 2024-11-22 | Stop reason: HOSPADM

## 2024-11-22 RX ORDER — MAGNESIUM HYDROXIDE 1200 MG/15ML
LIQUID ORAL CONTINUOUS PRN
Status: COMPLETED | OUTPATIENT
Start: 2024-11-22 | End: 2024-11-22

## 2024-11-22 RX ADMIN — Medication 2 SPRAY: at 12:30

## 2024-11-22 RX ADMIN — PROPOFOL 200 MG: 10 INJECTION, EMULSION INTRAVENOUS at 13:03

## 2024-11-22 RX ADMIN — ROCURONIUM BROMIDE 50 MG: 10 INJECTION, SOLUTION INTRAVENOUS at 13:03

## 2024-11-22 RX ADMIN — Medication 2000 MG: at 13:08

## 2024-11-22 RX ADMIN — LIDOCAINE HYDROCHLORIDE 80 MG: 20 INJECTION, SOLUTION EPIDURAL; INFILTRATION; INTRACAUDAL; PERINEURAL at 13:03

## 2024-11-22 RX ADMIN — LABETALOL HYDROCHLORIDE 5 MG: 5 INJECTION, SOLUTION INTRAVENOUS at 14:18

## 2024-11-22 RX ADMIN — LABETALOL HYDROCHLORIDE 2.5 MG: 5 INJECTION, SOLUTION INTRAVENOUS at 13:29

## 2024-11-22 RX ADMIN — HYDRALAZINE HYDROCHLORIDE 5 MG: 20 INJECTION INTRAMUSCULAR; INTRAVENOUS at 14:57

## 2024-11-22 RX ADMIN — SODIUM CHLORIDE, POTASSIUM CHLORIDE, SODIUM LACTATE AND CALCIUM CHLORIDE: 600; 310; 30; 20 INJECTION, SOLUTION INTRAVENOUS at 12:41

## 2024-11-22 RX ADMIN — FENTANYL CITRATE 50 MCG: 50 INJECTION INTRAMUSCULAR; INTRAVENOUS at 13:03

## 2024-11-22 RX ADMIN — SUGAMMADEX 200 MG: 100 INJECTION, SOLUTION INTRAVENOUS at 14:03

## 2024-11-22 RX ADMIN — LABETALOL HYDROCHLORIDE 2.5 MG: 5 INJECTION, SOLUTION INTRAVENOUS at 14:05

## 2024-11-22 RX ADMIN — ONDANSETRON 4 MG: 2 INJECTION, SOLUTION INTRAMUSCULAR; INTRAVENOUS at 13:57

## 2024-11-22 RX ADMIN — DEXAMETHASONE SODIUM PHOSPHATE 10 MG: 10 INJECTION, SOLUTION INTRAMUSCULAR; INTRAVENOUS at 13:08

## 2024-11-22 RX ADMIN — LABETALOL HYDROCHLORIDE 2.5 MG: 5 INJECTION, SOLUTION INTRAVENOUS at 13:21

## 2024-11-22 RX ADMIN — LABETALOL HYDROCHLORIDE 5 MG: 5 INJECTION, SOLUTION INTRAVENOUS at 14:09

## 2024-11-22 RX ADMIN — FENTANYL CITRATE 50 MCG: 50 INJECTION INTRAMUSCULAR; INTRAVENOUS at 13:16

## 2024-11-22 RX ADMIN — FENTANYL CITRATE 25 MCG: 50 INJECTION INTRAMUSCULAR; INTRAVENOUS at 14:07

## 2024-11-22 RX ADMIN — LABETALOL HYDROCHLORIDE 2.5 MG: 5 INJECTION, SOLUTION INTRAVENOUS at 14:03

## 2024-11-22 RX ADMIN — MIDAZOLAM 2 MG: 1 INJECTION INTRAMUSCULAR; INTRAVENOUS at 12:55

## 2024-11-22 ASSESSMENT — PAIN SCALES - GENERAL
PAINLEVEL_OUTOF10: 0

## 2024-11-22 ASSESSMENT — PAIN DESCRIPTION - DESCRIPTORS: DESCRIPTORS: ACHING

## 2024-11-22 ASSESSMENT — PAIN - FUNCTIONAL ASSESSMENT: PAIN_FUNCTIONAL_ASSESSMENT: 0-10

## 2024-11-22 NOTE — DISCHARGE INSTRUCTIONS
[phone]      POST-OPERATIVE INSTRUCTIONS: NASAL SURGERY      Activity: Rest today.    Do not drive or operate machinery for 24 hours.  Do not bend over.  Bending increase the pressure in the area of  the surgery and may cause bleeding.  Do not engage in any exertional activity, like heavy lifting or  exercise for at least two weeks.     Diet:  As Tolerated     General: Crusting and scabbing in the nose is expected for 3-4 weeks.     Do not blow your nose hard for 2 weeks     Use a vaporizer or humidifier at bedside     Medications: Use a saline nasal spray 3 times a day in each nares.     Use Afrin nasal spray, follow directions on the bottle     Office Visit: Call today for an appointment in 10-14 days    Observe for: Small amount of bloody discharge from the nose is expected for 7 - 10 days, but any fresh bleeding should be reported to your physician.        Call your physician if you have any problems that concern you.

## 2024-11-22 NOTE — H&P
Interval H&P Note    Pt Name: Isai Chapa  MRN: [id number]  YOB: 1960  Date of evaluation: 11/22/2024      [x] I have reviewed in epic the Pulmonology Progress Note by  Robert Roberts CNP on 11/19/24  attached below for the Interval History and Physical note.     [x] I have examined  Isai Chapa, a 64 y.o. male who arrived for his scheduled SEPTOPLASTY TURBINOPLASTY, SINUS ENDOSCOPY FUNCTIONAL SURGERY IMAGE GUIDED WITH LEFT ETHMOIDECTOMY, LEFT MAXILLARY ANTROSTOMY, LEFT FRONTAL SINUSOTOMY, LEFT SPHENOIDOTOMY by César Gudino MD for Chronic pansinusitis; Deviated nasal septum; Other chronic sinusitis; Hype*. The patient follows with ENT, Dr Gudino for chronic sinus issues  He c/o chronic congestion with pressure on the left side of his face. Dr Gudino discussed surgical intervention to which the patient agreed and presents for his procedures. Today  denies new health changes, fever, chills, wheezing, cough, increased SOB, chest pain, open sores or wounds.    Known Eosinophilic Asthma managed by Pulmonology with last visit on 11/19/24 by Robert Roberts CNP. Patient uses antiasthmatic and recently was approved for Nucala  He used his Dulera this am but did not require his albuterol.      Allergies:  Ciprofloxacin    Medications:    Prior to Admission medications    Medication Sig Start Date End Date Taking? Authorizing Provider   mepolizumab (NUCALA) 100 MG/ML SOAJ injection Inject 1 mL into the skin every 28 days 11/19/24   Robert Roberts, APRN - CNP   albuterol sulfate HFA (PROAIR HFA) 108 (90 Base) MCG/ACT inhaler Inhale 2 puffs into the lungs every 6 hours as needed for Wheezing or Shortness of Breath 6/28/24   Shady Cox,    mometasone-formoterol (DULERA) 200-5 MCG/ACT inhaler Inhale 2 puffs into the lungs in the morning and 2 puffs in the evening. 6/28/24   Shady Cox,    montelukast (SINGULAIR) 10 MG tablet Take 1 tablet by mouth nightly 6/28/24   Shady Cox, DO   tadalafil (CIALIS) 5

## 2024-11-22 NOTE — ANESTHESIA PRE PROCEDURE
Department of Anesthesiology  Preprocedure Note       Name:  Isai Chapa   Age:  64 y.o.  :  1960                                          MRN:  [id number]         Date:  2024      Surgeon: Surgeon(s):  César Gudino MD    Procedure: Procedure(s):  SEPTOPLASTY TURBINOPLASTY  SINUS ENDOSCOPY FUNCTIONAL SURGERY IMAGE GUIDED WITH LEFT ETHMOIDECTOMY, LEFT MAXILLARY ANTROSTOMY, LEFT FRONTAL SINUSOTOMY, LEFT SPHENOIDOTOMY    Medications prior to admission:   Prior to Admission medications    Medication Sig Start Date End Date Taking? Authorizing Provider   albuterol sulfate HFA (PROAIR HFA) 108 (90 Base) MCG/ACT inhaler Inhale 2 puffs into the lungs every 6 hours as needed for Wheezing or Shortness of Breath 24  Yes Shady Cox DO   mometasone-formoterol (DULERA) 200-5 MCG/ACT inhaler Inhale 2 puffs into the lungs in the morning and 2 puffs in the evening. 24  Yes Shady Cox DO   montelukast (SINGULAIR) 10 MG tablet Take 1 tablet by mouth nightly 24  Yes Shady Cox DO   tadalafil (CIALIS) 5 MG tablet Take 1 tablet by mouth daily  Patient taking differently: Take 1 tablet by mouth daily Patient takes on Tuesday, Thursday, Saturday and [place] 3/28/24  Yes Maurizio Resendiz MD   SUMAtriptan (IMITREX) 100 MG tablet Take 1 tablet by mouth daily as needed for Migraine 24  Yes Yanique Feldman MD   ramipril (ALTACE) 1.25 MG capsule TAKE 1 CAPSULE BY MOUTH ONE TIME A DAY 23  Yes Yanique Feldman MD   omeprazole (PRILOSEC OTC) 20 MG tablet 1 tablet daily OTC   Yes Provider, MD Elba   mepolizumab (NUCALA) 100 MG/ML SOAJ injection Inject 1 mL into the skin every 28 days 24   Robert Roberts, APRN - CNP       Current medications:    Current Facility-Administered Medications   Medication Dose Route Frequency Provider Last Rate Last Admin   • [START ON 2024] lidocaine PF 1 % injection 1 mL  1 mL IntraDERmal Once PRN Neeraj oK MD       • 0.9 % sodium chloride

## 2024-11-22 NOTE — OP NOTE
Operative Note      Patient: Isai Chapa  YOB: 1960  MRN: [id number]    Date of Procedure: 11/22/2024    Pre-Op Diagnosis Codes:      * Chronic pansinusitis [J32.4]     * Deviated nasal septum [J34.2]     * Other chronic sinusitis [J32.8]     * Hypertrophy of nasal turbinates [J34.3]  Chronic/Recurrent Left Frontal Sinusitis  Chronic/Recurrent Left Ethmoid Sinusitis  Chronic/Recurrent Left Maxillary Sinusitis   Chronic/Recurrent Left Sphenoid Sinusitis  Septal Deviation  Nasal Airway Obstruction  Turbinate Hypertrophy    Post-Op Diagnosis: Same       Procedure(s):  Computer Aided Stereotactic Endoscopic LEFT Frontal Sinusotomy with removal of diseased tissue  Computer Aided Stereotactic Endoscopic LEFT Total Ethmoidectomy  Computer Aided Stereotactic Endoscopic LEFT Maxillary Antrostomy with removal of diseased tissue  Computer Aided Stereotactic Endoscopic LEFT Sphenoidotomy with removal of diseased tissue  Septoplasty  Bilateral submucous resection of inferior turbinate  Bilateral outfracture of inferior turbinates    Surgeon(s):  César Gudino MD    Assistant:   * No surgical staff found *    Anesthesia: General    Estimated Blood Loss (mL): less than 100     Complications: None    Specimens:   ID Type Source Tests Collected by Time Destination   1 : LEFT MAXILLARY SINUS TUMOR FOR MICROBIOLOGY Tissue Maxillary Sinus CULTURE, TISSUE César Gudino MD 11/22/2024 1325    2 : LEFT MAXILLARY SINUS TUMOR Swab Maxillary Sinus CULTURE, ANAEROBIC AND AEROBIC César Gudino MD 11/22/2024 1330    A : LEFT MAXILLARY SINUS TUMOR Tissue Maxillary Sinus SURGICAL PATHOLOGY César Gudino MD 11/22/2024 1322    B : LEFT SINUS CONTENTS Tissue Sinus SURGICAL PATHOLOGY César Gudino MD 11/22/2024 1343        Implants:  * No implants in log *      Drains: * No LDAs found *    Findings:  Infection Present At Time Of Surgery (PATOS) (choose all levels that have infection present):  - Superficial Infection

## 2024-11-22 NOTE — ANESTHESIA POSTPROCEDURE EVALUATION
Department of Anesthesiology  Postprocedure Note    Patient: Isai Chapa  MRN: [id number]  YOB: 1960  Date of evaluation: 11/22/2024    Procedure Summary       Date: 11/22/24 Room / Location: [address]    Anesthesia Start: 1257 Anesthesia Stop: 1425    Procedures:       SEPTOPLASTY TURBINOPLASTY      SINUS ENDOSCOPY FUNCTIONAL SURGERY IMAGE GUIDED WITH LEFT ETHMOIDECTOMY, LEFT MAXILLARY ANTROSTOMY, LEFT FRONTAL SINUSOTOMY, LEFT SPHENOIDOTOMY (Left) Diagnosis:       Chronic pansinusitis      Deviated nasal septum      Other chronic sinusitis      Hypertrophy of nasal turbinates      (Chronic pansinusitis [J32.4])      (Deviated nasal septum [J34.2])      (Other chronic sinusitis [J32.8])      (Hypertrophy of nasal turbinates [J34.3])    Surgeons: César Gudino MD Responsible Provider: Braulio Schneider MD    Anesthesia Type: general ASA Status: Not recorded            Anesthesia Type: No value filed.    Bogdan Phase I: Bogdan Score: 10    Bogdan Phase II: Bogdan Score: 10    Anesthesia Post Evaluation    Patient location during evaluation: PACU  Patient participation: complete - patient participated  Level of consciousness: awake  Airway patency: patent  Nausea & Vomiting: no nausea  Cardiovascular status: blood pressure returned to baseline  Respiratory status: acceptable  Hydration status: euvolemic  Comments: Multimodal analgesia pain management as indicated by procedure  Multimodal analgesia pain management approach  Pain management: adequate    No notable events documented.

## 2024-11-24 LAB
MICROORGANISM SPEC CULT: ABNORMAL
MICROORGANISM SPEC CULT: NORMAL
MICROORGANISM/AGENT SPEC: ABNORMAL
MICROORGANISM/AGENT SPEC: ABNORMAL
MICROORGANISM/AGENT SPEC: NORMAL
MICROORGANISM/AGENT SPEC: NORMAL
SERVICE CMNT-IMP: ABNORMAL
SERVICE CMNT-IMP: NORMAL
SPECIMEN DESCRIPTION: ABNORMAL
SPECIMEN DESCRIPTION: NORMAL

## 2024-11-25 LAB
MICROORGANISM SPEC CULT: ABNORMAL
MICROORGANISM/AGENT SPEC: ABNORMAL
MICROORGANISM/AGENT SPEC: ABNORMAL
SERVICE CMNT-IMP: ABNORMAL
SPECIMEN DESCRIPTION: ABNORMAL

## 2024-11-27 LAB
MICROORGANISM SPEC CULT: ABNORMAL
MICROORGANISM SPEC CULT: ABNORMAL
MICROORGANISM/AGENT SPEC: ABNORMAL
MICROORGANISM/AGENT SPEC: ABNORMAL
SERVICE CMNT-IMP: ABNORMAL
SPECIMEN DESCRIPTION: ABNORMAL

## 2024-11-29 LAB — SURGICAL PATHOLOGY REPORT: NORMAL

## 2024-11-29 RX ORDER — RAMIPRIL 1.25 MG/1
CAPSULE ORAL
Qty: 90 CAPSULE | Refills: 3 | Status: SHIPPED | OUTPATIENT
Start: 2024-11-29

## 2024-11-29 NOTE — TELEPHONE ENCOUNTER
Isai Chapa is calling to request a refill on the following medication(s):    Last Visit Date (If Applicable):  1/24/2024    Next Visit Date:    Visit date not found    Medication Request:  Requested Prescriptions     Pending Prescriptions Disp Refills    ramipril (ALTACE) 1.25 MG capsule 90 capsule 3     Sig: TAKE 1 CAPSULE BY MOUTH ONE TIME A DAY

## 2024-12-02 ENCOUNTER — OFFICE VISIT (OUTPATIENT)
Dept: UROLOGY | Age: 64
End: 2024-12-02
Payer: COMMERCIAL

## 2024-12-02 ENCOUNTER — NURSE ONLY (OUTPATIENT)
Dept: FAMILY MEDICINE CLINIC | Age: 64
End: 2024-12-02

## 2024-12-02 ENCOUNTER — TELEPHONE (OUTPATIENT)
Dept: INTERNAL MEDICINE | Age: 64
End: 2024-12-02

## 2024-12-02 ENCOUNTER — PHARMACY VISIT (OUTPATIENT)
Dept: INTERNAL MEDICINE | Age: 64
End: 2024-12-02

## 2024-12-02 VITALS
HEART RATE: 68 BPM | BODY MASS INDEX: 25.48 KG/M2 | TEMPERATURE: 79.8 F | SYSTOLIC BLOOD PRESSURE: 124 MMHG | OXYGEN SATURATION: 98 % | HEIGHT: 71 IN | WEIGHT: 182 LBS | DIASTOLIC BLOOD PRESSURE: 80 MMHG

## 2024-12-02 VITALS — HEIGHT: 71 IN | TEMPERATURE: 97.2 F | BODY MASS INDEX: 25.48 KG/M2 | WEIGHT: 182 LBS

## 2024-12-02 DIAGNOSIS — Z23 NEED FOR INFLUENZA VACCINATION: Primary | ICD-10-CM

## 2024-12-02 DIAGNOSIS — R39.12 WEAK URINARY STREAM: ICD-10-CM

## 2024-12-02 DIAGNOSIS — N40.1 BPH WITH OBSTRUCTION/LOWER URINARY TRACT SYMPTOMS: ICD-10-CM

## 2024-12-02 DIAGNOSIS — J82.83 EOSINOPHILIC ASTHMA: ICD-10-CM

## 2024-12-02 DIAGNOSIS — R97.20 ELEVATED PSA: ICD-10-CM

## 2024-12-02 DIAGNOSIS — N13.8 BPH WITH OBSTRUCTION/LOWER URINARY TRACT SYMPTOMS: ICD-10-CM

## 2024-12-02 DIAGNOSIS — J45.51 ASTHMA, SEVERE PERSISTENT, POORLY-CONTROLLED, WITH ACUTE EXACERBATION: ICD-10-CM

## 2024-12-02 DIAGNOSIS — N52.01 ERECTILE DYSFUNCTION DUE TO ARTERIAL INSUFFICIENCY: Primary | ICD-10-CM

## 2024-12-02 PROCEDURE — 99214 OFFICE O/P EST MOD 30 MIN: CPT | Performed by: UROLOGY

## 2024-12-02 RX ORDER — MEPOLIZUMAB 100 MG/ML
100 INJECTION, SOLUTION SUBCUTANEOUS
Qty: 1 ML | Refills: 6 | Status: SHIPPED | OUTPATIENT
Start: 2024-12-02

## 2024-12-02 ASSESSMENT — ENCOUNTER SYMPTOMS
RESPIRATORY NEGATIVE: 1
ABDOMINAL PAIN: 0
COLOR CHANGE: 0
EYE REDNESS: 0
SHORTNESS OF BREATH: 0
ALLERGIC/IMMUNOLOGIC NEGATIVE: 1
GASTROINTESTINAL NEGATIVE: 1
EYE PAIN: 0
NAUSEA: 0
COUGH: 0
VOMITING: 0
EYES NEGATIVE: 1
WHEEZING: 0
BACK PAIN: 0

## 2024-12-02 NOTE — TELEPHONE ENCOUNTER
Specialty Medication Service    Date: 12/2/2024  Patient's Name: Isai Chapa YOB: 1960            _____________________________________________________________________________________________    Spoke with patient to reschedule PharmD follow up appointment for Specialty Medication Services. Patient scheduled 12/02/24 at 230pm.      Marina Dc CPhT  Pharmacy   Specialty Medication Services   Phone: [phone] option 4    For Pharmacy Admin Tracking Only    Program: [facility]  CPA in place:  Yes  Recommendation Provided To: Patient/Caregiver: 1 via Telephone  Intervention Detail: Scheduled Appointment  Intervention Accepted By: Patient/Caregiver: 1  Gap Closed?:    Time Spent (min): 15

## 2024-12-02 NOTE — PROGRESS NOTES
Review of Systems   Constitutional: Negative.  Negative for appetite change, chills and fever.   HENT: Negative.     Eyes: Negative.  Negative for pain, redness and visual disturbance.   Respiratory: Negative.  Negative for cough, shortness of breath and wheezing.    Cardiovascular: Negative.  Negative for chest pain and leg swelling.   Gastrointestinal: Negative.  Negative for abdominal pain, nausea and vomiting.   Endocrine: Negative.    Genitourinary: Negative.  Negative for difficulty urinating, dysuria, flank pain, frequency, hematuria, testicular pain and urgency.   Musculoskeletal: Negative.  Negative for back pain, joint swelling and myalgias.   Skin: Negative.  Negative for color change, rash and wound.   Allergic/Immunologic: Negative.    Neurological: Negative.  Negative for dizziness, tremors, weakness, numbness and headaches.   Hematological: Negative.  Negative for adenopathy. Does not bruise/bleed easily.   Psychiatric/Behavioral: Negative.       
SURGERY Bilateral     rk bilateral eyes    HERNIA REPAIR Left 05/07/2024    LEFT HERNIA INGUINAL REPAIR LAPAROSCOPIC ROBOTIC XI UMBILICAL HERNIA REPAIR performed by Manuel Velazquez MD at [facility] OR    NASAL SURG PROC UNLISTED N/A 11/22/2024    SEPTOPLASTY TURBINOPLASTY performed by César Gudino MD at [facility] OR    PAIN MANAGEMENT PROCEDURE Right 11/16/2020    RIGHT L4 AND L5 EPIDURAL STEROID INJECTION performed by Evan Sanchez MD at [facility] OR    PAIN MANAGEMENT PROCEDURE N/A 10/28/2021    BASIVERTEBRAL NERVE NERVE RADIOFREQUENCY ABLATION INTRACEPT PROCEDURE at L4 / L5 / S1 performed by Evan Sanchez MD at [facility] OR    REFRACTIVE SURGERY      SINUS ENDOSCOPY Left 11/22/2024    SINUS ENDOSCOPY FUNCTIONAL SURGERY IMAGE GUIDED WITH LEFT ETHMOIDECTOMY, LEFT MAXILLARY ANTROSTOMY, LEFT FRONTAL SINUSOTOMY, LEFT SPHENOIDOTOMY performed by César Gudino MD at [facility] OR    VASECTOMY       Family History   Problem Relation Age of Onset    No Known Problems Mother     Diabetes Father      Outpatient Medications Marked as Taking for the 12/2/24 encounter (Office Visit) with Maurizio Resendiz MD   Medication Sig Dispense Refill    ramipril (ALTACE) 1.25 MG capsule TAKE 1 CAPSULE BY MOUTH ONE TIME A DAY 90 capsule 3    mepolizumab (NUCALA) 100 MG/ML SOAJ injection Inject 1 mL into the skin every 28 days 1 mL 11    albuterol sulfate HFA (PROAIR HFA) 108 (90 Base) MCG/ACT inhaler Inhale 2 puffs into the lungs every 6 hours as needed for Wheezing or Shortness of Breath 3 each 3    mometasone-formoterol (DULERA) 200-5 MCG/ACT inhaler Inhale 2 puffs into the lungs in the morning and 2 puffs in the evening. 3 each 3    montelukast (SINGULAIR) 10 MG tablet Take 1 tablet by mouth nightly 90 tablet 3    tadalafil (CIALIS) 5 MG tablet Take 1 tablet by mouth daily (Patient taking differently: Take 1 tablet by mouth daily Patient takes on Tuesday, Thursday, Saturday and Sunday) 90 tablet 3    SUMAtriptan (IMITREX) 100 MG tablet Take 1 tablet by

## 2024-12-02 NOTE — PROGRESS NOTES
Specialty Medication Service    Patient's Name: Isai Chapa YOB: 1960      Reason for visit: Isai Chapa is a 64 y.o. male presenting today for Specialty Medication Service visit follow up. Patient last seen by [facility] 7/15/2024. Patient continues on [facility] formulary medication, Nucala. Pharmacy completed Specialty Medication Service visit for medication monitoring and counseling. Medication list updated.    Specialty Medication: Nucala 100 mg/ml SOAJ  Frequency: Every 4 weeks  Indication: Eosinophilic Asthma  Initially Diagnosed: early childhood  Additional Therapy:   Albuterol  Dulera  Singulair  Previous Therapy:   Qvar  Advair  Asmanex  Anoro Ellipta     Specialist:   Shady Cox  [address] [place] [address]  [phone]     Specialist Progress Note Available: Yes  Last Specialist Visit: 2024 - doing well on Nucala, continue as prescribed    Allergies   Allergen Reactions    Ciprofloxacin Dermatitis     Other Reaction(s): Unknown       Past Medical History:   Diagnosis Date    Allergic rhinitis     Arthritis     in hip    Asthma     GERD (gastroesophageal reflux disease)     Hypertension     Migraines     Osteoarthritis       Social History     Tobacco Use    Smoking status: Former     Types: Cigars     Quit date:      Years since quittin.9    Smokeless tobacco: Never    Tobacco comments:     patient states 4 cigars a year    Substance Use Topics    Alcohol use: Yes     Comment: Maybe 2-3 alcoholic beverages per month     Family History   Problem Relation Age of Onset    No Known Problems Mother     Diabetes Father      INTERM HISTORY  Have you been diagnosed with any additional conditions since we last talked? no  Have you developed any new allergies since we last talked? no  Have you stopped taking any medications or supplements since we last talked? no  Have you started taking any additional medications or supplements since we last talked?

## 2024-12-02 NOTE — PROGRESS NOTES
Vaccine Information Sheet, \"Influenza - Inactivated\"  given to Isai Chapa, or parent/legal guardian of  Isai Chapa and verbalized understanding.    Patient responses:    Have you ever had a reaction to a flu vaccine? No  Are you able to eat eggs without adverse effects?  Yes  Do you have any current illness?  No  Have you ever had Guillian [place] Syndrome?  No    Flu vaccine given per order. Please see immunization tab.

## 2024-12-02 NOTE — TELEPHONE ENCOUNTER
Specialty Medication Service    Date: 12/2/2024  Patient's Name: Isai Chapa YOB: 1960            _____________________________________________________________________________________________    Patient's PA for Nucala has been approved through 11/2025. Approval letter uploaded in Media tab    Terry Davis PharmD, [facility]  Ambulatory Clinical Pharmacist   [facility] Specialty Medication Service  Phone: [phone]  [facility]  Phone: [phone] option 1    For Pharmacy Admin Tracking Only    Program: SMS  CPA in place:  Yes  Time Spent (min): 30

## 2024-12-04 ENCOUNTER — HOSPITAL ENCOUNTER (OUTPATIENT)
Facility: CLINIC | Age: 64
Discharge: HOME | End: 2024-12-04

## 2024-12-04 NOTE — FLOWSHEET NOTE
[x] [facility]  Outpatient Rehabilitation &  Therapy  [address]  P: [phone]  F: [phone] [] [facility]  Outpatient Rehabilitation &  Therapy  [address]  P: [phone]  F: [phone]       Date:  2024    Patient: Isai Chapa  : 1960  MRN: [id number]    Previous Goals:   1) Eat a balanced breakfast meal within 1 hour of waking up; to include 30-35g protein + carbs, color, fat.   2) Pre/post workout: include a quick carb snack of ~30g carbs about 30-60 min before afternoon workout. For recovery, if dinner will be more than an hour after finishing workout, need to do a recovery snack with protein+ carbs  3) Balanced Snacks: if meals will be more than 4 hours apart, need to insert a balanced snack. During \"off season\", this needs to be 10-15g protein + any other food group    How the goals went per patient:  1) 2 scrambled or HBE (alone); OR Greek yogurt w/ granola   2) did add banana as preworkout snack in afternoon before runs   *Did not do recovery snack  3) Just banana for before run and PB pretzels in AM     Other Notes:  **Had sinus surgery on    -Dinners good because wife likes to cook   -Notes that lunch is hardest and sometimes does not have much; maybe once or twice made protein shake w/ whey protein, PB, milk, ice  -Waking up ~6-6:30 now, next week 6:30-7   -Lunch ideas: Bibibop, Shortys (BBQ), taco bowls  -Return to working out 4 days/week     New Goals:  1) Continue previous goals: increase breakfast to 30g protein and be sure to include something from each category (ie carb, fat); try a new balanced snack outside of PB pretzels  2) Performance plate lunch    Education and examples provided for all goals. Will be adjusting and adding each appointment.     Follow-up #2 of 6 will be  at 2 PM.

## 2024-12-05 ENCOUNTER — OFFICE VISIT (OUTPATIENT)
Dept: ORTHOPEDIC SURGERY | Age: 64
End: 2024-12-05

## 2024-12-05 VITALS — WEIGHT: 182 LBS | HEIGHT: 71 IN | BODY MASS INDEX: 25.48 KG/M2

## 2024-12-05 DIAGNOSIS — M70.62 GREATER TROCHANTERIC BURSITIS, LEFT: Primary | ICD-10-CM

## 2024-12-05 DIAGNOSIS — M76.02 GLUTEAL TENDINITIS OF LEFT BUTTOCK: ICD-10-CM

## 2024-12-05 RX ORDER — BUPIVACAINE HYDROCHLORIDE 2.5 MG/ML
2 INJECTION, SOLUTION INFILTRATION; PERINEURAL ONCE
Status: COMPLETED | OUTPATIENT
Start: 2024-12-05 | End: 2024-12-05

## 2024-12-05 RX ORDER — METHYLPREDNISOLONE ACETATE 80 MG/ML
80 INJECTION, SUSPENSION INTRA-ARTICULAR; INTRALESIONAL; INTRAMUSCULAR; SOFT TISSUE ONCE
Status: COMPLETED | OUTPATIENT
Start: 2024-12-05 | End: 2024-12-05

## 2024-12-05 RX ADMIN — METHYLPREDNISOLONE ACETATE 80 MG: 80 INJECTION, SUSPENSION INTRA-ARTICULAR; INTRALESIONAL; INTRAMUSCULAR; SOFT TISSUE at 16:20

## 2024-12-05 RX ADMIN — BUPIVACAINE HYDROCHLORIDE 5 MG: 2.5 INJECTION, SOLUTION INFILTRATION; PERINEURAL at 16:20

## 2024-12-06 NOTE — PROGRESS NOTES
[facility]                Elver Mcneal PA-C            [address]               [place], [place] [address]           Dept Phone: [phone]           Dept Fax:  [phone] [address]                       [address]           [place], [place] [address]          Dept Phone: [phone]           Dept Fax:  [phone]      Chief Compliant:  Chief Complaint   Patient presents with    Follow-up     Lt gluteal tear- requesting injection         History of Present Illness:  This is a 64 y.o. male who presents to the clinic today for evaluation of had concerns including Follow-up (Lt gluteal tear- requesting injection ).     Mr. Chapa is a pleasant 64-year-old gentleman who returns today for reevaluation of left hip and buttock pain.  He was last seen by me on 9/25/2024 reporting a 2-month history of this left hip and buttock pain that was most consistent with gluteal tendinopathy versus trochanteric bursitis.  Patient is an avid runner and states that that pain was significantly limiting at that time so an MRI and physical therapy referral was ordered and completed on 10/10/2024.  MRI revealed early osteoarthritis of both hips with evidence of low-grade interstitial tearing of the left gluteus medius tendon insertion and patient's area of pain.  Conversation was had and patient was recommended to come in for trial of injection should he fail physical therapy.    Patient did complete 2 months of PT noted marked improvement in his strength but continues to note soreness with running so did present today hoping undergo corticosteroid injection       Past History:    Current Outpatient Medications:     mepolizumab (NUCALA) 100 MG/ML SOAJ injection, Inject 1 mL into the skin every 28 days, Disp: 1 mL, Rfl: 6    ramipril (ALTACE) 1.25 MG capsule, TAKE 1 CAPSULE BY MOUTH ONE TIME A DAY, Disp: 90 capsule, Rfl: 3    albuterol sulfate HFA (PROAIR HFA)

## 2024-12-23 ENCOUNTER — HOSPITAL ENCOUNTER (OUTPATIENT)
Facility: CLINIC | Age: 64
Discharge: HOME | End: 2024-12-23

## 2025-01-29 ENCOUNTER — HOSPITAL ENCOUNTER (OUTPATIENT)
Facility: CLINIC | Age: 65
Discharge: HOME | End: 2025-01-29

## 2025-01-29 NOTE — FLOWSHEET NOTE
[x] [facility]  Outpatient Rehabilitation &  Therapy  [address]  P: [phone]  F: [phone] [] [facility]  Outpatient Rehabilitation &  Therapy  [address]  P: [phone]  F: [phone]       Date:  2025    Patient: Isai Chapa : 1960 MRN: [id number]    Notes: States things have been going much better the past couple of weeks. Added more color into diet.     -Lunch at home: leftovers from night before (protein + carb - not so much color here)   -Snack between lunch/dinner has been trying to figure out what to have   --->PB pretzels still go to; maybe an Oikos; chicken chunks in air fryer; Nutrition stop PB shake     Activity levels: slowed down running; run 2x/week (7-10 miles/week), biked 2x (15-1 hour), swim 1x/week    -Still eyeing some sprint tri    Items to send:  -Smoothie outline   -Using specific breakfast items (egg bites)    Goals: Discussed the impact that rounding out breakfast and lunch will have on hunger cues and energy levels in the afternoon. Smoothie breakfast has great balance, but need to round out the other options--specifically adding carb/color/fat sources to go with the protein.

## 2025-03-03 ENCOUNTER — HOSPITAL ENCOUNTER (OUTPATIENT)
Facility: CLINIC | Age: 65
Discharge: HOME | End: 2025-03-03

## 2025-03-03 NOTE — FLOWSHEET NOTE
[x] [facility]  Outpatient Rehabilitation &  Therapy  [address]  P: [phone]  F: [phone] [] [facility]  Outpatient Rehabilitation &  Therapy  [address]  P: [phone]  F: [phone]       Date:  3/3/2025    Patient: Isai Chpaa  : 1960  MRN: [id number]    1 hour bike, 2x/week; run 40-70 min, 2x/week; swimming 1x/week 1 hour (says around 5.5 hours per week workouts)    Has been focusing on the afternoon 2-4 PM snack. Trying to do more carb based like banana if about to do activity. More protein based if not (Oikos or leftover chicken). Notices a difference in energy during workout when doing a carb snack beforehand.    Mornings: Says likes to do more protein in morning and maybe some fruit before lunch for mid morning.   -Has not really added the suggested amounts   -30g Protein drink with maybe a piece of bread OR a banana    Trying to eat 5x/day. Notes yes a bit hungry by time hits lunch.     Maurten 320 in 16 oz bottle before longer runs if going in afternoon   -Middle to end of March will get into longer runs

## 2025-04-02 DIAGNOSIS — N52.01 ERECTILE DYSFUNCTION DUE TO ARTERIAL INSUFFICIENCY: Primary | ICD-10-CM

## 2025-04-02 DIAGNOSIS — N13.8 BPH WITH OBSTRUCTION/LOWER URINARY TRACT SYMPTOMS: ICD-10-CM

## 2025-04-02 DIAGNOSIS — N40.1 BPH WITH OBSTRUCTION/LOWER URINARY TRACT SYMPTOMS: ICD-10-CM

## 2025-04-03 NOTE — TELEPHONE ENCOUNTER
Medication name: tadalafil (CIALIS)    Medication dosage: 5 mg  Preferred pharmacy: [facility] [id number]  [place], [place] - [address] [facility] [phone] - F [phone]     Last office visit: 12/02/2025  Next office (or follow up plan- may copy and paste from last office visit at bottom):  Assessment  1. Erectile dysfunction due to arterial insufficiency    2. Elevated PSA    3. BPH with obstruction/lower urinary tract symptoms    4. Weak urinary stream             Plan:   Take Cialis daily to help with lower urinary tract symptoms  Follow-up 6 months with repeat PSA (high PSA velocity)      Assessment & Plan  Return in about 6 months (around 6/2/2025) for psa.

## 2025-04-07 RX ORDER — TADALAFIL 5 MG/1
5 TABLET ORAL DAILY
Qty: 90 TABLET | Refills: 3 | Status: SHIPPED | OUTPATIENT
Start: 2025-04-07

## 2025-04-15 LAB
CHOLEST SERPL-MCNC: 179 MG/DL (ref 0–199)
CHOLESTEROL/HDL RATIO: 4
GLUCOSE SERPL-MCNC: 91 MG/DL (ref 74–99)
HDLC SERPL-MCNC: 45 MG/DL
LDLC SERPL CALC-MCNC: 112 MG/DL (ref 0–100)
PATIENT FASTING?: YES
TRIGL SERPL-MCNC: 110 MG/DL
VLDLC SERPL CALC-MCNC: 22 MG/DL (ref 1–30)

## 2025-04-30 DIAGNOSIS — J45.51: ICD-10-CM

## 2025-04-30 NOTE — TELEPHONE ENCOUNTER
LAST VISIT: 11/19/24 with RACHID Roberts  NEXT VISIT: 6/27/25 with Dr Gomes    Per last dictation patient is on this medication. Please sign for refill if ok. Thank you.

## 2025-05-27 ENCOUNTER — HOSPITAL ENCOUNTER (OUTPATIENT)
Facility: CLINIC | Age: 65
Discharge: HOME | End: 2025-05-27
Payer: COMMERCIAL

## 2025-05-27 ENCOUNTER — TELEPHONE (OUTPATIENT)
Dept: UROLOGY | Age: 65
End: 2025-05-27

## 2025-05-27 ENCOUNTER — HOSPITAL ENCOUNTER (OUTPATIENT)
Age: 65
Discharge: HOME | End: 2025-05-27
Payer: COMMERCIAL

## 2025-05-27 DIAGNOSIS — R97.20 ELEVATED PSA: ICD-10-CM

## 2025-05-27 LAB — PSA SERPL-MCNC: 1.52 NG/ML (ref 0–4)

## 2025-05-27 PROCEDURE — 84153 ASSAY OF PSA TOTAL: CPT

## 2025-05-27 PROCEDURE — 36415 COLL VENOUS BLD VENIPUNCTURE: CPT

## 2025-05-27 NOTE — TELEPHONE ENCOUNTER
Writer called pt to remind them of appt on 5/27/25 and to have PSA prior to visit.  Patient will go today

## 2025-05-30 PROBLEM — M25.561 ACUTE PAIN OF RIGHT KNEE: Status: RESOLVED | Noted: 2018-05-17 | Resolved: 2025-05-30

## 2025-05-30 PROBLEM — M76.891 TENDONITIS OF KNEE, RIGHT: Status: RESOLVED | Noted: 2018-05-17 | Resolved: 2025-05-30

## 2025-05-30 NOTE — PROGRESS NOTES
Specialty Medication Service    Patient's Name: Isai Chapa YOB: 1960      Reason for visit: Isai Chapa is a 65 y.o. male presenting today for Specialty Medication Service visit follow up. Patient last seen by [facility] 2024. Patient continues on [facility] formulary medication, Nucala. Pharmacy completed Specialty Medication Service visit for medication monitoring and counseling. Medication list updated.    Specialty Medication: Nucala 100 mg/ml SOAJ  Frequency: Every 4 weeks  Indication: Eosinophilic Asthma  Initially Diagnosed: early childhood  Additional Therapy:   Albuterol  Dulera  Singulair  Previous Therapy:   Qvar  Advair  Asmanex  Anoro Ellipta     Specialist:   Shady Cox/Robert Roberts  [address] [address]  [phone]  Specialist Progress Note Available: Yes, EPIC ([facility] Provider)  Last Specialist Visit: 2024 - doing well on Nucala, continue as prescribed      Allergies   Allergen Reactions    Ciprofloxacin Dermatitis     Other Reaction(s): Unknown       Past Medical History:   Diagnosis Date    Allergic rhinitis     Arthritis     in hip    Asthma     GERD (gastroesophageal reflux disease)     Hypertension     Migraines     Osteoarthritis       Social History     Tobacco Use    Smoking status: Former     Types: Cigars     Quit date:      Years since quittin.4    Smokeless tobacco: Never    Tobacco comments:     patient states 4 cigars a year    Substance Use Topics    Alcohol use: Yes     Comment: Maybe 2-3 alcoholic beverages per month     Family History   Problem Relation Age of Onset    No Known Problems Mother     Diabetes Father      INTERM HISTORY  Have you been diagnosed with any additional conditions since we last talked? no  Have you developed any new allergies since we last talked? no  Have you stopped taking any medications or supplements since we last talked? no  Have you started taking any additional medications or

## 2025-06-02 ENCOUNTER — OFFICE VISIT (OUTPATIENT)
Dept: UROLOGY | Age: 65
End: 2025-06-02
Payer: COMMERCIAL

## 2025-06-02 ENCOUNTER — PHARMACY VISIT (OUTPATIENT)
Age: 65
End: 2025-06-02

## 2025-06-02 VITALS
SYSTOLIC BLOOD PRESSURE: 120 MMHG | WEIGHT: 176 LBS | HEIGHT: 71 IN | BODY MASS INDEX: 24.64 KG/M2 | OXYGEN SATURATION: 95 % | TEMPERATURE: 97.5 F | DIASTOLIC BLOOD PRESSURE: 82 MMHG | HEART RATE: 64 BPM

## 2025-06-02 DIAGNOSIS — J45.51: ICD-10-CM

## 2025-06-02 DIAGNOSIS — R35.1 NOCTURIA: ICD-10-CM

## 2025-06-02 DIAGNOSIS — N13.8 BPH WITH OBSTRUCTION/LOWER URINARY TRACT SYMPTOMS: ICD-10-CM

## 2025-06-02 DIAGNOSIS — N52.01 ERECTILE DYSFUNCTION DUE TO ARTERIAL INSUFFICIENCY: ICD-10-CM

## 2025-06-02 DIAGNOSIS — Z12.5 SCREENING FOR PROSTATE CANCER: ICD-10-CM

## 2025-06-02 DIAGNOSIS — J82.83 EOSINOPHILIC ASTHMA: ICD-10-CM

## 2025-06-02 DIAGNOSIS — N40.1 BPH WITH OBSTRUCTION/LOWER URINARY TRACT SYMPTOMS: ICD-10-CM

## 2025-06-02 DIAGNOSIS — R97.20 ELEVATED PSA: Primary | ICD-10-CM

## 2025-06-02 PROCEDURE — 99213 OFFICE O/P EST LOW 20 MIN: CPT | Performed by: UROLOGY

## 2025-06-02 RX ORDER — MEPOLIZUMAB 100 MG/ML
100 INJECTION, SOLUTION SUBCUTANEOUS
Qty: 1 ML | Refills: 6 | Status: SHIPPED | OUTPATIENT
Start: 2025-06-02

## 2025-06-02 ASSESSMENT — PROMIS GLOBAL HEALTH SCALE
IN GENERAL, WOULD YOU SAY YOUR HEALTH IS...[ON A SCALE OF 1 (POOR) TO 5 (EXCELLENT)]: VERY GOOD
IN GENERAL, PLEASE RATE HOW WELL YOU CARRY OUT YOUR USUAL SOCIAL ACTIVITIES (INCLUDES ACTIVITIES AT HOME, AT WORK, AND IN YOUR COMMUNITY, AND RESPONSIBILITIES AS A PARENT, CHILD, SPOUSE, EMPLOYEE, FRIEND, ETC) [ON A SCALE OF 1 (POOR) TO 5 (EXCELLENT)]?: EXCELLENT
IN THE PAST 7 DAYS, HOW WOULD YOU RATE YOUR PAIN ON AVERAGE [ON A SCALE FROM 0 (NO PAIN) TO 10 (WORST IMAGINABLE PAIN)]?: 0 NO PAIN
IN GENERAL, HOW WOULD YOU RATE YOUR PHYSICAL HEALTH [ON A SCALE OF 1 (POOR) TO 5 (EXCELLENT)]?: VERY GOOD
IN THE PAST 7 DAYS, HOW WOULD YOU RATE YOUR FATIGUE ON AVERAGE [ON A SCALE FROM 1 (NONE) TO 5 (VERY SEVERE)]?: MILD
SUM OF RESPONSES TO QUESTIONS 2, 4, 5, & 10: 19
SUM OF RESPONSES TO QUESTIONS 3, 6, 7, & 8: 13
IN GENERAL, WOULD YOU SAY YOUR QUALITY OF LIFE IS...[ON A SCALE OF 1 (POOR) TO 5 (EXCELLENT)]: VERY GOOD
IN GENERAL, HOW WOULD YOU RATE YOUR MENTAL HEALTH, INCLUDING YOUR MOOD AND YOUR ABILITY TO THINK [ON A SCALE OF 1 (POOR) TO 5 (EXCELLENT)]?: EXCELLENT
TO WHAT EXTENT ARE YOU ABLE TO CARRY OUT YOUR EVERYDAY PHYSICAL ACTIVITIES SUCH AS WALKING, CLIMBING STAIRS, CARRYING GROCERIES, OR MOVING A CHAIR [ON A SCALE OF 1 (NOT AT ALL) TO 5 (COMPLETELY)]?: COMPLETELY
IN THE PAST 7 DAYS, HOW OFTEN HAVE YOU BEEN BOTHERED BY EMOTIONAL PROBLEMS, SUCH AS FEELING ANXIOUS, DEPRESSED, OR IRRITABLE [ON A SCALE FROM 1 (NEVER) TO 5 (ALWAYS)]?: NEVER
IN GENERAL, HOW WOULD YOU RATE YOUR SATISFACTION WITH YOUR SOCIAL ACTIVITIES AND RELATIONSHIPS [ON A SCALE OF 1 (POOR) TO 5 (EXCELLENT)]?: EXCELLENT

## 2025-06-02 ASSESSMENT — ENCOUNTER SYMPTOMS
SHORTNESS OF BREATH: 0
VOMITING: 0
WHEEZING: 0
EYES NEGATIVE: 1
EYE REDNESS: 0
GASTROINTESTINAL NEGATIVE: 1
NAUSEA: 0
ALLERGIC/IMMUNOLOGIC NEGATIVE: 1
BACK PAIN: 0
EYE PAIN: 0
RESPIRATORY NEGATIVE: 1
COUGH: 0
COLOR CHANGE: 0
ABDOMINAL PAIN: 0

## 2025-06-02 NOTE — PROGRESS NOTES
[facility] PHYSICIANS [facility] UROLOGY CENTER  [address]  [facility] [address]  Dept: [phone]    [facility] Urology Office Note - Established    Patient:  Isai Chapa  YOB: 1960  Date: 6/2/2025    The patient is a 65 y.o. male who presents todayfor evaluation of the following problems:   Chief Complaint   Patient presents with    Erectile dysfunction due to arterial insufficiency     6 month PSA         HPI  This is a 65-year-old gentleman who we typically see for erectile dysfunction.  He continues to do reasonably well with tadalafil 5 mg daily.  We had seen him 6 months prior because his PSA had jumped up.  Thankfully, his repeat PSA has come back down to 1.52.  He does also have BPH.  The tadalafil has not helped him significantly for that but he is managing okay despite waking up twice a night    Summary of old records: N/A    Additional History: N/A    Procedures Today: N/A    Urinalysis today:  No results found for this visit on 06/02/25.  Last several PSA's:  Lab Results   Component Value Date    PSA 1.52 05/27/2025    PSA 2.60 11/12/2024    PSA 1.37 11/03/2023     Last total testosterone:  No results found for: \"TESTOSTERONE\"    AUA Symptom Score (6/2/2025):                               Last BUN and creatinine:  Lab Results   Component Value Date    BUN 15 11/04/2024     Lab Results   Component Value Date    CREATININE 0.8 11/04/2024       Additional Lab/Culture results: none    Imaging Reviewed during this Office Visit: none  (results were independently reviewed by physician and radiology report verified)    PAST MEDICAL, FAMILY AND SOCIAL HISTORY UPDATE:  Past Medical History:   Diagnosis Date    Allergic rhinitis     Arthritis     in hip    Asthma     GERD (gastroesophageal reflux disease)     Hypertension     Migraines     Osteoarthritis      Past Surgical History:   Procedure Laterality Date    COLONOSCOPY N/A 04/06/2018

## 2025-06-03 ENCOUNTER — OFFICE VISIT (OUTPATIENT)
Dept: FAMILY MEDICINE CLINIC | Age: 65
End: 2025-06-03
Payer: COMMERCIAL

## 2025-06-03 VITALS
SYSTOLIC BLOOD PRESSURE: 126 MMHG | BODY MASS INDEX: 25.34 KG/M2 | WEIGHT: 181 LBS | TEMPERATURE: 97.2 F | HEART RATE: 68 BPM | OXYGEN SATURATION: 98 % | HEIGHT: 71 IN | DIASTOLIC BLOOD PRESSURE: 78 MMHG

## 2025-06-03 DIAGNOSIS — Z00.00 ENCOUNTER FOR WELL ADULT EXAM WITHOUT ABNORMAL FINDINGS: Primary | ICD-10-CM

## 2025-06-03 DIAGNOSIS — J45.51: ICD-10-CM

## 2025-06-03 PROCEDURE — 99396 PREV VISIT EST AGE 40-64: CPT | Performed by: FAMILY MEDICINE

## 2025-06-03 SDOH — ECONOMIC STABILITY: FOOD INSECURITY: WITHIN THE PAST 12 MONTHS, THE FOOD YOU BOUGHT JUST DIDN'T LAST AND YOU DIDN'T HAVE MONEY TO GET MORE.: PATIENT DECLINED

## 2025-06-03 SDOH — ECONOMIC STABILITY: FOOD INSECURITY: WITHIN THE PAST 12 MONTHS, YOU WORRIED THAT YOUR FOOD WOULD RUN OUT BEFORE YOU GOT MONEY TO BUY MORE.: PATIENT DECLINED

## 2025-06-03 ASSESSMENT — PATIENT HEALTH QUESTIONNAIRE - PHQ9
SUM OF ALL RESPONSES TO PHQ QUESTIONS 1-9: 0
2. FEELING DOWN, DEPRESSED OR HOPELESS: NOT AT ALL
1. LITTLE INTEREST OR PLEASURE IN DOING THINGS: NOT AT ALL

## 2025-06-03 NOTE — PROGRESS NOTES
(RSV) Pregnant or age 60 yrs+ (1 - Risk 60-74 years 1-dose series) Never done    DTaP/Tdap/Td vaccine (3 - Td or Tdap) 04/30/2024    COVID-19 Vaccine (4 - 2024-25 season) 09/01/2024      Recommendations for Preventive Services Due: see orders and patient instructions/AVS.    (Please note that portions of this note were completed with a voice recognition program. Efforts were made to edit the dictations but occasionally words are mis-transcribed.)    The patient (or guardian, if applicable) and other individuals in attendance with the patient were advised that Artificial Intelligence will be utilized during this visit to record, process the conversation to generate a clinical note, and support improvement of the AI technology. The patient (or guardian, if applicable) and other individuals in attendance at the appointment consented to the use of AI, including the recording.

## 2025-06-17 DIAGNOSIS — J45.51: ICD-10-CM

## 2025-06-17 NOTE — TELEPHONE ENCOUNTER
LAST VISIT: 11/19/24 with RACHID Roberts  NEXT VISIT: 9/26/25 with Dr Vidal    Per last dictation patient is on this medication. Please sign for refill if ok. Thank you.

## 2025-06-18 RX ORDER — MONTELUKAST SODIUM 10 MG/1
10 TABLET ORAL NIGHTLY
Qty: 90 TABLET | Refills: 1 | Status: SHIPPED | OUTPATIENT
Start: 2025-06-18

## 2025-07-08 ENCOUNTER — PHARMACY VISIT (OUTPATIENT)
Age: 65
End: 2025-07-08

## 2025-07-08 DIAGNOSIS — J82.83 EOSINOPHILIC ASTHMA: ICD-10-CM

## 2025-07-08 DIAGNOSIS — J45.51: Primary | ICD-10-CM

## 2025-07-08 ASSESSMENT — PROMIS GLOBAL HEALTH SCALE
SUM OF RESPONSES TO QUESTIONS 2, 4, 5, & 10: 16
IN THE PAST 7 DAYS, HOW WOULD YOU RATE YOUR PAIN ON AVERAGE [ON A SCALE FROM 0 (NO PAIN) TO 10 (WORST IMAGINABLE PAIN)]?: 2
IN GENERAL, PLEASE RATE HOW WELL YOU CARRY OUT YOUR USUAL SOCIAL ACTIVITIES (INCLUDES ACTIVITIES AT HOME, AT WORK, AND IN YOUR COMMUNITY, AND RESPONSIBILITIES AS A PARENT, CHILD, SPOUSE, EMPLOYEE, FRIEND, ETC) [ON A SCALE OF 1 (POOR) TO 5 (EXCELLENT)]?: VERY GOOD
IN GENERAL, HOW WOULD YOU RATE YOUR MENTAL HEALTH, INCLUDING YOUR MOOD AND YOUR ABILITY TO THINK [ON A SCALE OF 1 (POOR) TO 5 (EXCELLENT)]?: VERY GOOD
HOW IS THE PROMIS V1.1 BEING ADMINISTERED?: ELECTRONIC
WHO IS THE PERSON COMPLETING THE PROMIS V1.1 SURVEY?: SELF
IN GENERAL, PLEASE RATE HOW WELL YOU CARRY OUT YOUR USUAL SOCIAL ACTIVITIES (INCLUDES ACTIVITIES AT HOME, AT WORK, AND IN YOUR COMMUNITY, AND RESPONSIBILITIES AS A PARENT, CHILD, SPOUSE, EMPLOYEE, FRIEND, ETC) [ON A SCALE OF 1 (POOR) TO 5 (EXCELLENT)]?: VERY GOOD
IN GENERAL, HOW WOULD YOU RATE YOUR SATISFACTION WITH YOUR SOCIAL ACTIVITIES AND RELATIONSHIPS [ON A SCALE OF 1 (POOR) TO 5 (EXCELLENT)]?: VERY GOOD
HOW IS THE PROMIS V1.1 BEING ADMINISTERED?: ELECTRONIC
IN THE PAST 7 DAYS, HOW OFTEN HAVE YOU BEEN BOTHERED BY EMOTIONAL PROBLEMS, SUCH AS FEELING ANXIOUS, DEPRESSED, OR IRRITABLE [ON A SCALE FROM 1 (NEVER) TO 5 (ALWAYS)]?: RARELY
TO WHAT EXTENT ARE YOU ABLE TO CARRY OUT YOUR EVERYDAY PHYSICAL ACTIVITIES SUCH AS WALKING, CLIMBING STAIRS, CARRYING GROCERIES, OR MOVING A CHAIR [ON A SCALE OF 1 (NOT AT ALL) TO 5 (COMPLETELY)]?: COMPLETELY
TO WHAT EXTENT ARE YOU ABLE TO CARRY OUT YOUR EVERYDAY PHYSICAL ACTIVITIES SUCH AS WALKING, CLIMBING STAIRS, CARRYING GROCERIES, OR MOVING A CHAIR [ON A SCALE OF 1 (NOT AT ALL) TO 5 (COMPLETELY)]?: COMPLETELY
IN GENERAL, HOW WOULD YOU RATE YOUR PHYSICAL HEALTH [ON A SCALE OF 1 (POOR) TO 5 (EXCELLENT)]?: VERY GOOD
IN THE PAST 7 DAYS, HOW WOULD YOU RATE YOUR FATIGUE ON AVERAGE [ON A SCALE FROM 1 (NONE) TO 5 (VERY SEVERE)]?: MILD
IN THE PAST 7 DAYS, HOW WOULD YOU RATE YOUR PAIN ON AVERAGE [ON A SCALE FROM 0 (NO PAIN) TO 10 (WORST IMAGINABLE PAIN)]?: 2
WHO IS THE PERSON COMPLETING THE PROMIS V1.1 SURVEY?: SELF
IN THE PAST 7 DAYS, HOW OFTEN HAVE YOU BEEN BOTHERED BY EMOTIONAL PROBLEMS, SUCH AS FEELING ANXIOUS, DEPRESSED, OR IRRITABLE [ON A SCALE FROM 1 (NEVER) TO 5 (ALWAYS)]?: RARELY
SUM OF RESPONSES TO QUESTIONS 3, 6, 7, & 8: 15
IN GENERAL, WOULD YOU SAY YOUR QUALITY OF LIFE IS...[ON A SCALE OF 1 (POOR) TO 5 (EXCELLENT)]: VERY GOOD
IN GENERAL, HOW WOULD YOU RATE YOUR SATISFACTION WITH YOUR SOCIAL ACTIVITIES AND RELATIONSHIPS [ON A SCALE OF 1 (POOR) TO 5 (EXCELLENT)]?: VERY GOOD
IN THE PAST 7 DAYS, HOW WOULD YOU RATE YOUR FATIGUE ON AVERAGE [ON A SCALE FROM 1 (NONE) TO 5 (VERY SEVERE)]?: MILD
IN GENERAL, WOULD YOU SAY YOUR HEALTH IS...[ON A SCALE OF 1 (POOR) TO 5 (EXCELLENT)]: VERY GOOD

## 2025-07-08 NOTE — PROGRESS NOTES
Specialty Medication Follow up Virtual Visit  [facility]  [facility]  [address]  [facility] [address]  Dept: [phone]  Dept Fax: [phone]  Date of patient's visit: 7/8/2025  Patient's Name:  Isai Chapa YOB: 1960            Patient Care Team:  Yanique Feldman MD as PCP - General (Family Medicine)  Yanique Feldman MD as PCP - Empaneled Provider  Shady Cox DO as Consulting Physician (Pulmonology)  Maurizio Resendiz MD as Consulting Physician (Urology)  Robert Roberts APRN - CNP as Nurse Practitioner (Pulmonology)  ================================================================    REASON FOR VISIT/CHIEF COMPLAINT:  No chief complaint on file.    HISTORY OF PRESENTING ILLNESS:  Specialty Medication: Nucala 100 mg/ml SOAJ  Frequency: Every 4 weeks  Indication: Eosinophilic Asthma  Initially Diagnosed: early childhood  Additional Therapy:   Albuterol  Dulera  Singulair  Previous Therapy:   Qvar  Advair  Asmanex  Anoro Ellipta     Specialist:   Shady Cox/Robert Roberts  [address] [place] [address]  [phone]  Specialist Progress Note Available: Yes, EPIC ([facility] Provider)  Last Specialist Visit: 11/19/2024 - doing well on Nucala, continue as prescribed       Isai Chapa has no new complain today.   He is doing well on nucala, has no side effects   He is not up to date on vaccinations     DIAGNOSTIC FINDINGS:  CBC:  Lab Results   Component Value Date/Time    WBC 5.7 11/04/2024 02:19 PM    HGB 14.3 11/04/2024 02:19 PM     11/04/2024 02:19 PM       BMP:    Lab Results   Component Value Date/Time     11/04/2024 02:19 PM    K 3.5 11/04/2024 02:19 PM     11/04/2024 02:19 PM    CO2 26 11/04/2024 02:19 PM    BUN 15 11/04/2024 02:19 PM    CREATININE 0.8 11/04/2024 02:19 PM    GLUCOSE 91 04/15/2025 09:41 AM       HEMOGLOBIN A1C: No results found for: \"LABA1C\"    FASTING LIPID PANEL:  Lab Results   Component

## 2025-07-16 ENCOUNTER — OFFICE VISIT (OUTPATIENT)
Dept: ORTHOPEDIC SURGERY | Age: 65
End: 2025-07-16

## 2025-07-16 VITALS — RESPIRATION RATE: 14 BRPM | BODY MASS INDEX: 25.34 KG/M2 | WEIGHT: 181 LBS | HEIGHT: 71 IN

## 2025-07-16 DIAGNOSIS — M25.562 ACUTE PAIN OF LEFT KNEE: Primary | ICD-10-CM

## 2025-07-16 NOTE — PROGRESS NOTES
[facility]  [address]  [facility] [address]  Dept Phone: [phone]  Dept Fax: [phone]      Orthopaedic Trauma New Problem      CHIEF COMPLAINT:    Chief Complaint   Patient presents with    Knee Pain     Left       HISTORY OF PRESENT ILLNESS:    The patient is a 65 y.o. male who is being seen as an established patient here for new problem.  Patient presents for evaluation approximate 6-week history of left knee pain.  He does not recall any specific injury or trauma prior to the onset of his pain.  He states that pain initially started in the posterior knee associated with intermittent swelling but over the last couple weeks pain has been more severe to the anterior medial knee.  He states pain is especially aggravated with running or rising from a seated position after prolonged period of rest.  Patient denies any new joint warmth, redness, fever or chills.      Past Medical History:    Past Medical History:   Diagnosis Date    Allergic rhinitis     Arthritis     in hip    Asthma     GERD (gastroesophageal reflux disease)     Hypertension     Migraines     Osteoarthritis        Past Surgical History:    Past Surgical History:   Procedure Laterality Date    COLONOSCOPY N/A 04/06/2018    COLONOSCOPY POLYPECTOMY SNARE/COLD BIOPSY performed by Mina Atkins MD at [facility] OR    EYE SURGERY Bilateral     rk bilateral eyes    HERNIA REPAIR Left 05/07/2024    LEFT HERNIA INGUINAL REPAIR LAPAROSCOPIC ROBOTIC XI UMBILICAL HERNIA REPAIR performed by Manuel Velazquez MD at [facility] OR    NASAL SURG PROC UNLISTED N/A 11/22/2024    SEPTOPLASTY TURBINOPLASTY performed by César Gudino MD at [facility] OR    PAIN MANAGEMENT PROCEDURE Right 11/16/2020    RIGHT L4 AND L5 EPIDURAL STEROID INJECTION performed by Evan Sanchez MD at [facility] OR    PAIN MANAGEMENT PROCEDURE N/A 10/28/2021    BASIVERTEBRAL NERVE NERVE RADIOFREQUENCY ABLATION INTRACEPT PROCEDURE at L4 / L5 / S1 performed by Evan Sanchez

## 2025-07-18 RX ORDER — BUPIVACAINE HYDROCHLORIDE 2.5 MG/ML
2 INJECTION, SOLUTION INFILTRATION; PERINEURAL ONCE
Status: COMPLETED | OUTPATIENT
Start: 2025-07-18 | End: 2025-07-18

## 2025-07-18 RX ORDER — METHYLPREDNISOLONE ACETATE 80 MG/ML
80 INJECTION, SUSPENSION INTRA-ARTICULAR; INTRALESIONAL; INTRAMUSCULAR; SOFT TISSUE ONCE
Status: COMPLETED | OUTPATIENT
Start: 2025-07-18 | End: 2025-07-18

## 2025-07-18 RX ADMIN — METHYLPREDNISOLONE ACETATE 80 MG: 80 INJECTION, SUSPENSION INTRA-ARTICULAR; INTRALESIONAL; INTRAMUSCULAR; SOFT TISSUE at 14:55

## 2025-07-18 RX ADMIN — BUPIVACAINE HYDROCHLORIDE 5 MG: 2.5 INJECTION, SOLUTION INFILTRATION; PERINEURAL at 14:55

## 2025-08-08 RX ORDER — PREDNISONE 20 MG/1
TABLET ORAL
Qty: 30 TABLET | Refills: 0 | Status: SHIPPED | OUTPATIENT
Start: 2025-08-08 | End: 2025-08-18

## (undated) DEVICE — TUBING, SUCTION, 1/4" X 12', STRAIGHT: Brand: MEDLINE

## (undated) DEVICE — INTRACEPT RF PROBE

## (undated) DEVICE — TROCARS: Brand: KII® BALLOON BLUNT TIP SYSTEM

## (undated) DEVICE — ZINACTIVE USE 2539609 APPLICATOR MEDICATED 10.5 CC SOLUTION HI LT ORNG CHLORAPREP

## (undated) DEVICE — NEEDLE SPINAL 22GA L3.5IN SPINOCAN

## (undated) DEVICE — DRAPE,EENT,SPLIT,STERILE: Brand: MEDLINE

## (undated) DEVICE — SOLUTION IRRIGATION STRL H2O 1000 ML UROMATIC CONTAINER

## (undated) DEVICE — SET ADMIN 25ML L117IN PMP MOD CK VLV RLER CLMP 2 SMRTSITE

## (undated) DEVICE — ELECTRODE PT RET AD L9FT HI MOIST COND ADH HYDRGEL CORDED

## (undated) DEVICE — ARM DRAPE

## (undated) DEVICE — PATIENT TRACKER 9734887XOM NON-INVASIVE

## (undated) DEVICE — SUTURE PDS II SZ 0 L27IN ABSRB VLT UR-6 L26MM 1/2 CIR D7185

## (undated) DEVICE — COAGULATOR SUCT 10FR L6IN HND FT SWCH VALLEYLAB

## (undated) DEVICE — MEDICINE CUP, GRADUATED, STER: Brand: MEDLINE

## (undated) DEVICE — SPONGE GZ W2XL2IN NONWOVEN 4 PLY FASTER WICKING ABIL AVANT

## (undated) DEVICE — PAD ELECTROSURG HRVSTR CORD PATIENT

## (undated) DEVICE — TUBING 1895522 5PK STRAIGHTSHOT TO XPS: Brand: STRAIGHTSHOT®

## (undated) DEVICE — CORD,CAUTERY,BIPOLAR,STERILE: Brand: MEDLINE

## (undated) DEVICE — SUTURE ABSORBABLE MONOFILAMENT 4-0 SC-1 18 IN PLN GUT 1824H

## (undated) DEVICE — TOWEL,OR,DSP,ST,BLUE,DLX,XR,4/PK,20PK/CS: Brand: MEDLINE

## (undated) DEVICE — GLOVE SURG SZ 8 THK118MIL BLK LTX FREE POLYISOPRENE BEAD CUF

## (undated) DEVICE — 4-PORT MANIFOLD: Brand: NEPTUNE 2

## (undated) DEVICE — GLOVE SURG SZ 75 CRM LTX FREE POLYISOPRENE POLYMER BEAD ANTI

## (undated) DEVICE — SOCK SPEC L9IN WHT UNIV W/ STD PRT FOR FLD MGMT

## (undated) DEVICE — TIP COVER ACCESSORY

## (undated) DEVICE — NEEDLE SPNL L4.5IN OD22GA QNCKE TYP SPINOCAN

## (undated) DEVICE — 1010 S-DRAPE TOWEL DRAPE 10/BX: Brand: STERI-DRAPE™

## (undated) DEVICE — GAUZE,SPONGE,4"X4",16PLY,STRL,LF,10/TRAY: Brand: MEDLINE

## (undated) DEVICE — Device

## (undated) DEVICE — Z DISCONTINUED USE 2220241 SUTURE SZ 0 27IN 5/8 CIR UR-6  TAPER PT VIOLET ABSRB VICRYL J603H

## (undated) DEVICE — FIRM 8CM: Brand: NASOPORE

## (undated) DEVICE — SOLUTION IRRIG 3000ML 0.9% SOD CHL USP UROMATIC PLAS CONT

## (undated) DEVICE — EXTENSION SET IV 12 IN 0.45 CC INFUSION MINIBOR TBNG CLMP

## (undated) DEVICE — SKIN PREP TRAY W/CHG: Brand: MEDLINE INDUSTRIES, INC.

## (undated) DEVICE — SOLUTION IV 1000ML 0.9% SOD CHL PH 5 INJ USP VIAFLX PLAS

## (undated) DEVICE — SUTURE ETHILON SZ 3-0 L18IN NONABSORBABLE BLK PS-2 L19MM 3/8 1669H

## (undated) DEVICE — SPONGE,NEURO,0.5"X3",XR,STRL,LF,10/PK: Brand: MEDLINE

## (undated) DEVICE — SPLINT 1524055 DOYLE II AIRWAY SET: Brand: DOYLE II ™

## (undated) DEVICE — SEAL

## (undated) DEVICE — CUP MED 1OZ CLR POLYPR FEED GRAD W/O LID

## (undated) DEVICE — SINGLE DOSE EPI TY

## (undated) DEVICE — FORCEPS BX L240CM WRK CHN 2.8MM STD CAP W/ NDL MIC MESH

## (undated) DEVICE — STAZ ROBOT: Brand: MEDLINE INDUSTRIES, INC.

## (undated) DEVICE — KIT,ANTI FOG,W/SPONGE & FLUID,SOFT PACK: Brand: MEDLINE

## (undated) DEVICE — ANES EXTENSION SET 90IN-LF: Brand: MEDLINE INDUSTRIES, INC.

## (undated) DEVICE — INSTRUMENT TRACKER 9733533XOM ENT 1PK

## (undated) DEVICE — SUTURE VICRYL + SZ 2-0 L27IN ABSRB WHT SH 1/2 CIR TAPERCUT VCP417H

## (undated) DEVICE — GLOVE SURG SZ 7 CRM LTX FREE POLYISOPRENE POLYMER BEAD ANTI

## (undated) DEVICE — BLADELESS OBTURATOR: Brand: WECK VISTA

## (undated) DEVICE — STAZ ENT: Brand: MEDLINE INDUSTRIES, INC.

## (undated) DEVICE — SOLUTION IRRIG 500ML 0.9% SOD CHLO USP POUR PLAS BTL

## (undated) DEVICE — POUCH INSTR W6.75XL11.5IN FRST 2 PKT ADH FOR ORTH AND

## (undated) DEVICE — SUTURE MONOCRYL SZ 4-0 L27IN ABSRB UD L19MM PS-2 1/2 CIR PRIM Y426H

## (undated) DEVICE — SUTURE V-LOC 90 3-0 L9IN ABSRB VLT L26MM V-20 1/2 CIR TAPR VLOCM0644

## (undated) DEVICE — WIPE INSTR W73XL73CM VISITEC

## (undated) DEVICE — SUTURE MONOCRYL + SZ 5 0 L18IN ABSRB UD L13MM P 3 3 8 CIR PRIM MCP493G